# Patient Record
Sex: FEMALE | Race: ASIAN | NOT HISPANIC OR LATINO
[De-identification: names, ages, dates, MRNs, and addresses within clinical notes are randomized per-mention and may not be internally consistent; named-entity substitution may affect disease eponyms.]

---

## 2017-09-14 ENCOUNTER — TRANSCRIPTION ENCOUNTER (OUTPATIENT)
Age: 27
End: 2017-09-14

## 2017-09-14 PROBLEM — Z00.00 ENCOUNTER FOR PREVENTIVE HEALTH EXAMINATION: Noted: 2017-09-14

## 2017-09-15 ENCOUNTER — ASOB RESULT (OUTPATIENT)
Age: 27
End: 2017-09-15

## 2017-09-15 ENCOUNTER — APPOINTMENT (OUTPATIENT)
Dept: ANTEPARTUM | Facility: CLINIC | Age: 27
End: 2017-09-15
Payer: MEDICAID

## 2017-09-15 PROCEDURE — 76813 OB US NUCHAL MEAS 1 GEST: CPT

## 2017-10-31 ENCOUNTER — ASOB RESULT (OUTPATIENT)
Age: 27
End: 2017-10-31

## 2017-10-31 ENCOUNTER — APPOINTMENT (OUTPATIENT)
Dept: ANTEPARTUM | Facility: CLINIC | Age: 27
End: 2017-10-31
Payer: MEDICAID

## 2017-10-31 PROCEDURE — 76820 UMBILICAL ARTERY ECHO: CPT

## 2017-10-31 PROCEDURE — 76811 OB US DETAILED SNGL FETUS: CPT

## 2017-11-07 ENCOUNTER — OUTPATIENT (OUTPATIENT)
Dept: INPATIENT UNIT | Facility: HOSPITAL | Age: 27
LOS: 1 days | Discharge: HOME | End: 2017-11-07

## 2017-11-07 LAB
AMNISURE ROM (RUPTURE OF MEMBRANES): NEGATIVE — SIGNIFICANT CHANGE UP
APPEARANCE UR: CLEAR — SIGNIFICANT CHANGE UP
BACTERIA # UR AUTO: (no result)
BILIRUB UR-MCNC: NEGATIVE — SIGNIFICANT CHANGE UP
COLOR SPEC: YELLOW — SIGNIFICANT CHANGE UP
DIFF PNL FLD: NEGATIVE — SIGNIFICANT CHANGE UP
EPI CELLS # UR: SIGNIFICANT CHANGE UP
GLUCOSE UR QL: NEGATIVE MG/DL — SIGNIFICANT CHANGE UP
KETONES UR-MCNC: NEGATIVE — SIGNIFICANT CHANGE UP
LEUKOCYTE ESTERASE UR-ACNC: (no result)
NITRITE UR-MCNC: NEGATIVE — SIGNIFICANT CHANGE UP
PH UR: 7 — SIGNIFICANT CHANGE UP (ref 5–8)
PROT UR-MCNC: NEGATIVE MG/DL — SIGNIFICANT CHANGE UP
RBC CASTS # UR COMP ASSIST: NEGATIVE /HPF — SIGNIFICANT CHANGE UP (ref 0–4)
SP GR SPEC: 1.01 — SIGNIFICANT CHANGE UP (ref 1.01–1.02)
UROBILINOGEN FLD QL: NEGATIVE MG/DL — SIGNIFICANT CHANGE UP
WBC UR QL: SIGNIFICANT CHANGE UP

## 2017-11-13 DIAGNOSIS — O47.9 FALSE LABOR, UNSPECIFIED: ICD-10-CM

## 2018-01-23 ENCOUNTER — APPOINTMENT (OUTPATIENT)
Dept: ANTEPARTUM | Facility: CLINIC | Age: 28
End: 2018-01-23
Payer: MEDICAID

## 2018-01-23 ENCOUNTER — ASOB RESULT (OUTPATIENT)
Age: 28
End: 2018-01-23

## 2018-01-23 PROCEDURE — 76816 OB US FOLLOW-UP PER FETUS: CPT

## 2018-01-23 PROCEDURE — 76820 UMBILICAL ARTERY ECHO: CPT

## 2018-02-05 ENCOUNTER — APPOINTMENT (OUTPATIENT)
Dept: ANTEPARTUM | Facility: CLINIC | Age: 28
End: 2018-02-05
Payer: MEDICAID

## 2018-02-05 ENCOUNTER — ASOB RESULT (OUTPATIENT)
Age: 28
End: 2018-02-05

## 2018-02-05 PROCEDURE — 76816 OB US FOLLOW-UP PER FETUS: CPT

## 2018-02-05 PROCEDURE — 76819 FETAL BIOPHYS PROFIL W/O NST: CPT

## 2018-02-06 ENCOUNTER — INPATIENT (INPATIENT)
Facility: HOSPITAL | Age: 28
LOS: 6 days | Discharge: HOME | End: 2018-02-13
Attending: OBSTETRICS & GYNECOLOGY | Admitting: OBSTETRICS & GYNECOLOGY
Payer: MEDICAID

## 2018-02-06 VITALS
DIASTOLIC BLOOD PRESSURE: 80 MMHG | RESPIRATION RATE: 18 BRPM | SYSTOLIC BLOOD PRESSURE: 136 MMHG | HEIGHT: 72 IN | OXYGEN SATURATION: 100 % | HEART RATE: 96 BPM | WEIGHT: 220.02 LBS | TEMPERATURE: 98 F

## 2018-02-06 PROCEDURE — 99285 EMERGENCY DEPT VISIT HI MDM: CPT

## 2018-02-06 RX ORDER — ACETAMINOPHEN 500 MG
975 TABLET ORAL ONCE
Qty: 0 | Refills: 0 | Status: COMPLETED | OUTPATIENT
Start: 2018-02-06 | End: 2018-02-07

## 2018-02-06 NOTE — ED ADULT NURSE NOTE - OBJECTIVE STATEMENT
Pt presents to ER 34 weeks pregnant c/o left lower extremity swelling. Pt reports onset of symptoms began Sunday night. LLE is swollen, erythremic, skin intact, warm to touch. Pt denies fever/chills/SOB/CP. Swelling is from left hip to ankle. Denies trauma/falls. AO x 3 oriented to baseline, normal breathing pattern with no difficulty

## 2018-02-07 LAB
ABO RH CONFIRMATION: SIGNIFICANT CHANGE UP
ALBUMIN SERPL ELPH-MCNC: 2.5 G/DL — LOW (ref 3.3–5)
ALP SERPL-CCNC: 150 U/L — HIGH (ref 40–120)
ALT FLD-CCNC: 15 U/L — SIGNIFICANT CHANGE UP (ref 12–78)
ANION GAP SERPL CALC-SCNC: 11 MMOL/L — SIGNIFICANT CHANGE UP (ref 5–17)
APTT BLD: 40.1 SEC — HIGH (ref 27.5–37.4)
AST SERPL-CCNC: 17 U/L — SIGNIFICANT CHANGE UP (ref 15–37)
BILIRUB SERPL-MCNC: 0.4 MG/DL — SIGNIFICANT CHANGE UP (ref 0.2–1.2)
BLD GP AB SCN SERPL QL: SIGNIFICANT CHANGE UP
BUN SERPL-MCNC: 12 MG/DL — SIGNIFICANT CHANGE UP (ref 7–23)
CALCIUM SERPL-MCNC: 8.9 MG/DL — SIGNIFICANT CHANGE UP (ref 8.5–10.1)
CHLORIDE SERPL-SCNC: 104 MMOL/L — SIGNIFICANT CHANGE UP (ref 96–108)
CO2 SERPL-SCNC: 20 MMOL/L — LOW (ref 22–31)
CREAT SERPL-MCNC: 0.59 MG/DL — SIGNIFICANT CHANGE UP (ref 0.5–1.3)
GLUCOSE SERPL-MCNC: 89 MG/DL — SIGNIFICANT CHANGE UP (ref 70–99)
HCT VFR BLD CALC: 37.6 % — SIGNIFICANT CHANGE UP (ref 34.5–45)
HGB BLD-MCNC: 12.2 G/DL — SIGNIFICANT CHANGE UP (ref 11.5–15.5)
INR BLD: 0.98 RATIO — SIGNIFICANT CHANGE UP (ref 0.88–1.16)
MCHC RBC-ENTMCNC: 24.7 PG — LOW (ref 27–34)
MCHC RBC-ENTMCNC: 32.6 GM/DL — SIGNIFICANT CHANGE UP (ref 32–36)
MCV RBC AUTO: 75.8 FL — LOW (ref 80–100)
PLATELET # BLD AUTO: 282 K/UL — SIGNIFICANT CHANGE UP (ref 150–400)
POTASSIUM SERPL-MCNC: 3.9 MMOL/L — SIGNIFICANT CHANGE UP (ref 3.5–5.3)
POTASSIUM SERPL-SCNC: 3.9 MMOL/L — SIGNIFICANT CHANGE UP (ref 3.5–5.3)
PROT SERPL-MCNC: 7.3 GM/DL — SIGNIFICANT CHANGE UP (ref 6–8.3)
PROTHROM AB SERPL-ACNC: 10.6 SEC — SIGNIFICANT CHANGE UP (ref 9.8–12.7)
RBC # BLD: 4.96 M/UL — SIGNIFICANT CHANGE UP (ref 3.8–5.2)
RBC # FLD: 14.2 % — SIGNIFICANT CHANGE UP (ref 10.3–14.5)
SODIUM SERPL-SCNC: 135 MMOL/L — SIGNIFICANT CHANGE UP (ref 135–145)
T PALLIDUM AB TITR SER: NEGATIVE — SIGNIFICANT CHANGE UP
TYPE + AB SCN PNL BLD: SIGNIFICANT CHANGE UP
WBC # BLD: 12.9 K/UL — HIGH (ref 3.8–10.5)
WBC # FLD AUTO: 12.9 K/UL — HIGH (ref 3.8–10.5)

## 2018-02-07 PROCEDURE — 93971 EXTREMITY STUDY: CPT | Mod: 26,LT

## 2018-02-07 RX ORDER — ENOXAPARIN SODIUM 100 MG/ML
100 INJECTION SUBCUTANEOUS ONCE
Qty: 0 | Refills: 0 | Status: COMPLETED | OUTPATIENT
Start: 2018-02-07 | End: 2018-02-07

## 2018-02-07 RX ORDER — ENOXAPARIN SODIUM 100 MG/ML
100 INJECTION SUBCUTANEOUS EVERY 12 HOURS
Qty: 0 | Refills: 0 | Status: DISCONTINUED | OUTPATIENT
Start: 2018-02-07 | End: 2018-02-13

## 2018-02-07 RX ORDER — ACETAMINOPHEN 500 MG
325 TABLET ORAL EVERY 4 HOURS
Qty: 0 | Refills: 0 | Status: DISCONTINUED | OUTPATIENT
Start: 2018-02-07 | End: 2018-02-13

## 2018-02-07 RX ORDER — ENOXAPARIN SODIUM 100 MG/ML
100 INJECTION SUBCUTANEOUS
Qty: 0 | Refills: 0 | Status: DISCONTINUED | OUTPATIENT
Start: 2018-02-07 | End: 2018-02-07

## 2018-02-07 RX ORDER — SODIUM CHLORIDE 9 MG/ML
1000 INJECTION, SOLUTION INTRAVENOUS
Qty: 0 | Refills: 0 | Status: DISCONTINUED | OUTPATIENT
Start: 2018-02-07 | End: 2018-02-07

## 2018-02-07 RX ORDER — SODIUM CHLORIDE 9 MG/ML
3 INJECTION INTRAMUSCULAR; INTRAVENOUS; SUBCUTANEOUS EVERY 8 HOURS
Qty: 0 | Refills: 0 | Status: DISCONTINUED | OUTPATIENT
Start: 2018-02-07 | End: 2018-02-13

## 2018-02-07 RX ORDER — ACETAMINOPHEN 500 MG
650 TABLET ORAL EVERY 6 HOURS
Qty: 0 | Refills: 0 | Status: DISCONTINUED | OUTPATIENT
Start: 2018-02-07 | End: 2018-02-13

## 2018-02-07 RX ADMIN — Medication 650 MILLIGRAM(S): at 07:35

## 2018-02-07 RX ADMIN — ENOXAPARIN SODIUM 100 MILLIGRAM(S): 100 INJECTION SUBCUTANEOUS at 13:03

## 2018-02-07 RX ADMIN — SODIUM CHLORIDE 3 MILLILITER(S): 9 INJECTION INTRAMUSCULAR; INTRAVENOUS; SUBCUTANEOUS at 22:27

## 2018-02-07 RX ADMIN — ENOXAPARIN SODIUM 100 MILLIGRAM(S): 100 INJECTION SUBCUTANEOUS at 01:30

## 2018-02-07 RX ADMIN — Medication 650 MILLIGRAM(S): at 19:44

## 2018-02-07 RX ADMIN — Medication 975 MILLIGRAM(S): at 00:20

## 2018-02-07 NOTE — H&P ADULT - NSHPPHYSICALEXAM_GEN_ALL_CORE
WNWD female NAD with left leg swelling from thigh to foot.   heart s1/s2  lungs cta with deep inspiration  Abd: gravid uterus 34 cm  no CVAT  VE: deferred  LE: left markedly swollen from groin to foot, non pitting, no redness to particular vein  right neg Melecio's, no edema

## 2018-02-07 NOTE — PROGRESS NOTE ADULT - SUBJECTIVE AND OBJECTIVE BOX
CHIEF COMPLAINT: Patient is a 27y old  Female who presents with a chief complaint of left leg pain (2018 02:29)    HPI:  28 y/o  at 34 weeks gestation followed by Dr Rubio for routine prenatal care noted left leg swelling beginning on 18. States that the swelling progressed and became the worst on 18 and she developed pain where it was difficult to walk. Had a 2-3 hour car ride recently but no other travel. Denies prior history of clots or family history of cancer.     Interval HPI: Patient was seen and examined at bedside. No acute events overnight. Patient is still complaining of left lower extremity pain and swelling. Is minimally ambulatory, as she is still able to get up and walk to the bathroom. Denies chest pain, palpitations, abdominal pain, n/v/d, LOF, contractions or vaginal bleedings. Reports feeling normal fetal activity. No signs or symptoms of bleeding, and no other complaints.      PAST MEDICAL & SURGICAL HISTORY:  No pertinent past medical history  No significant past surgical history      SOCIAL HISTORY: No active tobacco, alcohol or illicit drug use    FAMILY HISTORY:  No pertinent family history in first degree relatives      MEDICATIONS  (STANDING):  enoxaparin Injectable 100 milliGRAM(s) SubCutaneous every 12 hours  sodium chloride 0.45%. 1000 milliLiter(s) (50 mL/Hr) IV Continuous <Continuous>    MEDICATIONS  (PRN):  acetaminophen   Tablet. 325 milliGRAM(s) Oral every 4 hours PRN Mild Pain (1 - 3)  acetaminophen   Tablet. 650 milliGRAM(s) Oral every 6 hours PRN Moderate Pain (4 - 6)      Allergies    No Known Allergies    Intolerances      REVIEW OF SYSTEMS: Is negative for eye, ENT, GI, , allergic, dermatologic, musculoskeletal and neurologic, except as described above.    VITAL SIGNS:   Vital Signs Last 24 Hrs  T(C): 36.5 (2018 07:05), Max: 36.6 (2018 22:51)  T(F): 97.7 (2018 07:05), Max: 97.8 (2018 22:51)  HR: 83 (2018 07:05) (83 - 96)  BP: 98/67 (2018 07:05) (98/67 - 136/80)  RR: 16 (2018 07:05) (16 - 18)  SpO2: 100% (2018 07:05) (99% - 100%)      PHYSICAL EXAM:    Constitutional: NAD, awake and alert, well-developed  Eyes:  EOMI, no oral cyanosis, conjunctivae clear, anicteric.  Pulmonary: Non-labored, breath sounds are clear bilaterally, no wheezing, rales or rhonchi  Cardiovascular:  regular S1 and S2. No murmur.  No rubs, gallops or clicks  Gastrointestinal: Bowel Sounds present, soft, nontender; gravid uterus  Neurological: Alert, strength and sensitivity are grossly intact  Skin: No obvious lesions/rashes.  Extremities: +left leg edema with tenderness to palpation  Psych:  Mood & affect appropriate.    LABS: All Labs Reviewed:                        12.2   12.9  )-----------( 282      ( 2018 02:49 )             37.6     2018 02:49    135    |  104    |  12     ----------------------------<  89     3.9     |  20     |  0.59     Ca    8.9        2018 02:49    TPro  7.3    /  Alb  2.5    /  TBili  0.4    /  DBili  x      /  AST  17     /  ALT  15     /  AlkPhos  150    2018 02:49    PT/INR - ( 2018 02:49 )   PT: 10.6 sec;   INR: 0.98 ratio         PTT - ( 2018 02:49 )  PTT:40.1 sec    RADIOLOGY:  < from: US Duplex Venous Lower Ext Ltd, Left (18 @ 00:45) >  EXAM:  US DPLX LWR EXT VEINS LTD LT                            PROCEDURE DATE:  2018        INTERPRETATION:  CLINICAL INFORMATION: Pregnant with left lower extremity   pain and swelling.    TECHNIQUE: Duplex Doppler ultrasound was performedutilizing integrated   2-D real-time imaging color flow Doppler and Doppler spectral analysis of   the left lower extremity vein(s). Venous Doppler waveforms were obtained   for analysis.    COMPARISON: No similar prior studies are available for comparison.    FINDINGS:   Intraluminal echogenicity with lack of color flow and lack of compression   is seen distending the left common femoral, proximal greater saphenous   and femoral veins compatible with an occlusive deep venous thrombosis.   There is no evidence of deep venous thrombosis in the left popliteal or   posterior tibial veins.    The contralateral common femoral vein is patent.    IMPRESSION:  Above the knee left lower extremity deep venous thrombosis.    < end of copied text >

## 2018-02-07 NOTE — H&P ADULT - NSHPLABSRESULTS_GEN_ALL_CORE
T(C): 36.6 (02-06-18 @ 22:51), Max: 36.6 (02-06-18 @ 22:51)  HR: 96 (02-06-18 @ 22:51) (96 - 96)  BP: 136/80 (02-06-18 @ 22:51) (136/80 - 136/80)  RR: 18 (02-06-18 @ 22:51) (18 - 18)  SpO2: 100% (02-06-18 @ 22:51) (100% - 100%)  Wt(kg): --

## 2018-02-07 NOTE — ED PROVIDER NOTE - PROGRESS NOTE DETAILS
case d/w Dr Tristan (OB) and will evaluate pt pt evaluated by Kun and will admit.  request medicine consult.  case d/w Danay and will consult.

## 2018-02-07 NOTE — ED ADULT NURSE REASSESSMENT NOTE - NS ED NURSE REASSESS COMMENT FT1
Labor and Delivery nurse at the bedside and reported to me . Pt resting comfortable in bed, denies CP/SOB. Pt updated on plan of care

## 2018-02-07 NOTE — H&P ADULT - HISTORY OF PRESENT ILLNESS
at 34 weeks gestation followed by Dr Rubio for routine prenatal care noted beginning of left leg swelling on  or . Pt was seen for ultrasound in OB office on 18, but did not mention leg pain. when pain became unbearable, she presented to ER.  Pt denies recent travel other than 1 hour car ride to [place]. There is no family history of blood clots as reinforced by her mother.  Baby is active. No vaginal bleeding,  cramping nor loss of fluid.  Pt was being followed sonographically for low fluid, which she states was adequate on Tues 18.

## 2018-02-07 NOTE — PROGRESS NOTE ADULT - ASSESSMENT
28 y/o  at 34 weeks gestation admitted with acute left lower extremity DVT.    -Continue 100mg lovenox bid (1mg/kg bid)  -Appreciate medicine consult recommendations  -Follow up Xa level 4hours after 3rd dose of lovenox (should be collected 18 at 0530AM)  -NST reactive, No present evidence of fetal compromise  -No signs or symptoms of bleeding at this time  -Will discuss with Dr. Rubio 26 y/o  at 34 weeks gestation admitted with acute left lower extremity DVT.    -Likely secondary to hypercoagulability of pregnancy and long car ride  -Continue 100mg lovenox bid (1mg/kg bid)  -MFM consult appreciated, may need to consult hematology to evaluate role of switching to heparin  -Appreciate medicine consult recommendations as well  -Follow up Xa level 4hours after 3rd dose of lovenox (should be collected 18 at 0530AM)  -NST reactive, No present evidence of fetal compromise  -No signs or symptoms of bleeding at this time  -Will discuss with Dr. Rubio 28 y/o  at 34 weeks gestation admitted with acute left lower extremity DVT.    -Likely secondary to hypercoagulability of pregnancy and long car ride  -Continue 100mg lovenox bid (1mg/kg bid) at this time  -MFM consult appreciated, may need to consult heme to evaluate role of switching to heparin  -Appreciate medicine consult recommendations as well  -Follow up Xa level 4hours after 3rd dose of lovenox (should be collected 18 at 0530AM)  -NST reactive, No present evidence of fetal compromise  -No signs or symptoms of bleeding at this time  -Will discuss with Dr. Rubio

## 2018-02-07 NOTE — ED PROVIDER NOTE - OBJECTIVE STATEMENT
26 y/o female in ED c/o 28 y/o female in ED c/o worsening LLE swelling and pain x 3 days.  pt states approx 34 weeks pregnant.  pt denies any fever, HA, cp, sob, n/v/d/abd pain.  denies any recent travel or sick contacts.

## 2018-02-08 LAB
ANION GAP SERPL CALC-SCNC: 8 MMOL/L — SIGNIFICANT CHANGE UP (ref 5–17)
APPEARANCE UR: CLEAR — SIGNIFICANT CHANGE UP
BACTERIA # UR AUTO: (no result)
BASOPHILS # BLD AUTO: 0.1 K/UL — SIGNIFICANT CHANGE UP (ref 0–0.2)
BASOPHILS NFR BLD AUTO: 0.4 % — SIGNIFICANT CHANGE UP (ref 0–2)
BILIRUB UR-MCNC: NEGATIVE — SIGNIFICANT CHANGE UP
BUN SERPL-MCNC: 12 MG/DL — SIGNIFICANT CHANGE UP (ref 7–23)
CALCIUM SERPL-MCNC: 8.7 MG/DL — SIGNIFICANT CHANGE UP (ref 8.5–10.1)
CHLORIDE SERPL-SCNC: 104 MMOL/L — SIGNIFICANT CHANGE UP (ref 96–108)
CO2 SERPL-SCNC: 22 MMOL/L — SIGNIFICANT CHANGE UP (ref 22–31)
COLOR SPEC: (no result)
COMMENT - URINE: SIGNIFICANT CHANGE UP
CREAT SERPL-MCNC: 0.61 MG/DL — SIGNIFICANT CHANGE UP (ref 0.5–1.3)
DIFF PNL FLD: (no result)
EOSINOPHIL # BLD AUTO: 0 K/UL — SIGNIFICANT CHANGE UP (ref 0–0.5)
EOSINOPHIL NFR BLD AUTO: 0.4 % — SIGNIFICANT CHANGE UP (ref 0–6)
EPI CELLS # UR: SIGNIFICANT CHANGE UP
GLUCOSE SERPL-MCNC: 116 MG/DL — HIGH (ref 70–99)
GLUCOSE UR QL: NEGATIVE MG/DL — SIGNIFICANT CHANGE UP
HCT VFR BLD CALC: 35.4 % — SIGNIFICANT CHANGE UP (ref 34.5–45)
HGB BLD-MCNC: 11.5 G/DL — SIGNIFICANT CHANGE UP (ref 11.5–15.5)
KETONES UR-MCNC: (no result)
LEUKOCYTE ESTERASE UR-ACNC: (no result)
LYMPHOCYTES # BLD AUTO: 1.4 K/UL — SIGNIFICANT CHANGE UP (ref 1–3.3)
LYMPHOCYTES # BLD AUTO: 11 % — LOW (ref 13–44)
MCHC RBC-ENTMCNC: 24.8 PG — LOW (ref 27–34)
MCHC RBC-ENTMCNC: 32.4 GM/DL — SIGNIFICANT CHANGE UP (ref 32–36)
MCV RBC AUTO: 76.6 FL — LOW (ref 80–100)
MONOCYTES # BLD AUTO: 0.9 K/UL — SIGNIFICANT CHANGE UP (ref 0–0.9)
MONOCYTES NFR BLD AUTO: 6.8 % — SIGNIFICANT CHANGE UP (ref 2–14)
NEUTROPHILS # BLD AUTO: 10.3 K/UL — HIGH (ref 1.8–7.4)
NEUTROPHILS NFR BLD AUTO: 81.4 % — HIGH (ref 43–77)
NITRITE UR-MCNC: NEGATIVE — SIGNIFICANT CHANGE UP
PH UR: 6 — SIGNIFICANT CHANGE UP (ref 5–8)
PLATELET # BLD AUTO: 252 K/UL — SIGNIFICANT CHANGE UP (ref 150–400)
POTASSIUM SERPL-MCNC: 3.9 MMOL/L — SIGNIFICANT CHANGE UP (ref 3.5–5.3)
POTASSIUM SERPL-SCNC: 3.9 MMOL/L — SIGNIFICANT CHANGE UP (ref 3.5–5.3)
PROT UR-MCNC: 15 MG/DL
RBC # BLD: 4.62 M/UL — SIGNIFICANT CHANGE UP (ref 3.8–5.2)
RBC # FLD: 14.4 % — SIGNIFICANT CHANGE UP (ref 10.3–14.5)
RBC CASTS # UR COMP ASSIST: >50 /HPF (ref 0–4)
SODIUM SERPL-SCNC: 134 MMOL/L — LOW (ref 135–145)
SP GR SPEC: 1.02 — SIGNIFICANT CHANGE UP (ref 1.01–1.02)
UFH PPP CHRO-ACNC: 0.93 U/ML — HIGH (ref 0.3–0.7)
UROBILINOGEN FLD QL: 1 MG/DL
WBC # BLD: 12.7 K/UL — HIGH (ref 3.8–10.5)
WBC # FLD AUTO: 12.7 K/UL — HIGH (ref 3.8–10.5)
WBC UR QL: SIGNIFICANT CHANGE UP

## 2018-02-08 PROCEDURE — 99222 1ST HOSP IP/OBS MODERATE 55: CPT

## 2018-02-08 RX ORDER — SODIUM CHLORIDE 9 MG/ML
1000 INJECTION INTRAMUSCULAR; INTRAVENOUS; SUBCUTANEOUS
Qty: 0 | Refills: 0 | Status: DISCONTINUED | OUTPATIENT
Start: 2018-02-08 | End: 2018-02-09

## 2018-02-08 RX ORDER — SODIUM CHLORIDE 9 MG/ML
1000 INJECTION, SOLUTION INTRAVENOUS ONCE
Qty: 0 | Refills: 0 | Status: COMPLETED | OUTPATIENT
Start: 2018-02-08 | End: 2018-02-08

## 2018-02-08 RX ORDER — SODIUM CHLORIDE 9 MG/ML
1000 INJECTION, SOLUTION INTRAVENOUS
Qty: 0 | Refills: 0 | Status: DISCONTINUED | OUTPATIENT
Start: 2018-02-08 | End: 2018-02-08

## 2018-02-08 RX ORDER — SODIUM CHLORIDE 9 MG/ML
1000 INJECTION, SOLUTION INTRAVENOUS
Qty: 0 | Refills: 0 | Status: COMPLETED | OUTPATIENT
Start: 2018-02-08 | End: 2018-02-08

## 2018-02-08 RX ADMIN — Medication 650 MILLIGRAM(S): at 14:15

## 2018-02-08 RX ADMIN — SODIUM CHLORIDE 3 MILLILITER(S): 9 INJECTION INTRAMUSCULAR; INTRAVENOUS; SUBCUTANEOUS at 14:23

## 2018-02-08 RX ADMIN — Medication 650 MILLIGRAM(S): at 06:21

## 2018-02-08 RX ADMIN — Medication 650 MILLIGRAM(S): at 23:52

## 2018-02-08 RX ADMIN — Medication 650 MILLIGRAM(S): at 22:52

## 2018-02-08 RX ADMIN — ENOXAPARIN SODIUM 100 MILLIGRAM(S): 100 INJECTION SUBCUTANEOUS at 01:03

## 2018-02-08 RX ADMIN — SODIUM CHLORIDE 250 MILLILITER(S): 9 INJECTION, SOLUTION INTRAVENOUS at 15:52

## 2018-02-08 RX ADMIN — SODIUM CHLORIDE 2000 MILLILITER(S): 9 INJECTION, SOLUTION INTRAVENOUS at 12:43

## 2018-02-08 RX ADMIN — SODIUM CHLORIDE 3 MILLILITER(S): 9 INJECTION INTRAMUSCULAR; INTRAVENOUS; SUBCUTANEOUS at 06:28

## 2018-02-08 RX ADMIN — Medication 650 MILLIGRAM(S): at 15:53

## 2018-02-08 RX ADMIN — ENOXAPARIN SODIUM 100 MILLIGRAM(S): 100 INJECTION SUBCUTANEOUS at 14:19

## 2018-02-08 NOTE — PROGRESS NOTE ADULT - ASSESSMENT
27F.  admitted 18.   @ 34 weeks gestation.  c/o LLE swelling and pain.  onset of symptoms 2 days prior to admission.  LE venous doppler c/w above the knee LLE DVT.    LLE DVT likely pregnancy related (hypercoagulable from pregnancy).  -US noted. Pt currently denies any SOB. No tachycardia on exam.  -c/w Lovenox 1mg/kg q12h.  -Hematology + IR + VSx consults.    pregnancy 34 weeks  -management per OB.    DVT prophylaxis.  -LMWH.    disposition.  -OB service.    communication.  -d/w RN.  -d/w Hematology.

## 2018-02-08 NOTE — PROGRESS NOTE ADULT - SUBJECTIVE AND OBJECTIVE BOX
S: Patient has same complaints of pain when she tries to ambulate. Degree of swelling is not changed. Measurement above the knee is same and measurement of left ankle is slightly increased by a centimeter.  Discussed case with Dr. Luis A Joseph from Hematology who advised coordination of management with [facility] and also advised a vascular surgery and interventional radiology consult due to the significant size of the clot and swelling. Agrees with present Lovenox dose for now.  Patient informed of the plan of management and will do daily NST's and biweekly BPP.  MFM note not seen so will reconsult today.    O: Vital Signs Last 24 Hrs    T(C): 36.9 (08 Feb 2018 07:37), Max: 37.1 (08 Feb 2018 04:15)  T(F): 98.5 (08 Feb 2018 07:37), Max: 98.8 (08 Feb 2018 04:15)  HR: 90 (08 Feb 2018 07:37) (80 - 101)  BP: 118/59 (08 Feb 2018 07:37) (109/81 - 118/59)  BP(mean): --  RR: 16 (08 Feb 2018 07:37) (16 - 16)  SpO2: 99% (08 Feb 2018 07:37) (99% - 100%)    Gen: NAD  Abd: soft, NT, ND, fundus firm below umbilicus  Ext: no tenderness    Labs:                        11.5   12.7  )-----------( 252      ( 08 Feb 2018 07:02 )             35.4         Plan:  As above.

## 2018-02-08 NOTE — PROGRESS NOTE ADULT - SUBJECTIVE AND OBJECTIVE BOX
CC:  Patient is a 27y old  Female who presents with a chief complaint of left leg pain (07 Feb 2018 02:29)    SUBJECTIVE:  patient's mother at bedside.  lying in bed comfortably w/ her left leg elevated.  Venodyne noted on right leg.  reports swelling has modestly increased to mid-thigh.  nontender.  comfortable.    ROS:  (-) LLE pain.    acetaminophen   Tablet. 325 milliGRAM(s) Oral every 4 hours PRN  acetaminophen   Tablet. 650 milliGRAM(s) Oral every 6 hours PRN  enoxaparin Injectable 100 milliGRAM(s) SubCutaneous every 12 hours  sodium chloride 0.9% lock flush 3 milliLiter(s) IV Push every 8 hours    T(C): 36.9 (02-08-18 @ 07:37), Max: 37.1 (02-08-18 @ 04:15)  HR: 90 (02-08-18 @ 07:37) (88 - 101)  BP: 118/59 (02-08-18 @ 07:37) (110/62 - 118/59)  RR: 16 (02-08-18 @ 07:37) (16 - 16)  SpO2: 99% (02-08-18 @ 07:37) (99% - 100%)    PHYSICAL EXAM:  no acute distress.  neck supple.  JVP nml.  lungs CTA.  no WRR.  heart RRR.  no MRG.  abd gravid.  soft.  NT.  ND.  ext (+) LLE edema to level of mid-thigh.  right calf warm to touch.  dorsalis pedis pulse 2/4.  A&Ox3.                        11.5   12.7  )-----------( 252      ( 08 Feb 2018 07:02 )             35.4     02-08    134<L>  |  104  |  12  ----------------------------<  116<H>  3.9   |  22  |  0.61    Ca    8.7      08 Feb 2018 07:02    TPro  7.3  /  Alb  2.5<L>  /  TBili  0.4  /  DBili  x   /  AST  17  /  ALT  15  /  AlkPhos  150<H>  02-07

## 2018-02-09 DIAGNOSIS — O22.30 DEEP PHLEBOTHROMBOSIS IN PREGNANCY, UNSPECIFIED TRIMESTER: ICD-10-CM

## 2018-02-09 PROCEDURE — 99233 SBSQ HOSP IP/OBS HIGH 50: CPT

## 2018-02-09 RX ADMIN — SODIUM CHLORIDE 150 MILLILITER(S): 9 INJECTION INTRAMUSCULAR; INTRAVENOUS; SUBCUTANEOUS at 09:44

## 2018-02-09 RX ADMIN — Medication 650 MILLIGRAM(S): at 20:13

## 2018-02-09 RX ADMIN — ENOXAPARIN SODIUM 100 MILLIGRAM(S): 100 INJECTION SUBCUTANEOUS at 01:38

## 2018-02-09 RX ADMIN — Medication 650 MILLIGRAM(S): at 21:13

## 2018-02-09 RX ADMIN — SODIUM CHLORIDE 3 MILLILITER(S): 9 INJECTION INTRAMUSCULAR; INTRAVENOUS; SUBCUTANEOUS at 06:12

## 2018-02-09 RX ADMIN — ENOXAPARIN SODIUM 100 MILLIGRAM(S): 100 INJECTION SUBCUTANEOUS at 13:28

## 2018-02-09 NOTE — PROGRESS NOTE ADULT - SUBJECTIVE AND OBJECTIVE BOX
HD# 3  Feeling less pain in her lower leg. Told her about plan to consult IR and consult with MFM. Has been kept NPO.  Dr. Newsome called but was in a conference.  Left a message for him to call.

## 2018-02-09 NOTE — CONSULT NOTE ADULT - CONSULT REASON
36 week pregnant /LLE DVT
DVT in pregnancy
DVT in pregnancy
L leg ileofemoral DVT
left lower extremity DVT in a female 34 weeks pregnant
Acute LLE  DVT Pt is 34weeks gestation, anticoagulation management

## 2018-02-09 NOTE — PROGRESS NOTE ADULT - SUBJECTIVE AND OBJECTIVE BOX
CC:  Patient is a 27y old  Female who presents with a chief complaint of left leg pain (2018 02:29)    : left leg pain better  no thrombectomy      PHYSICAL EXAM:    Daily     Daily     ICU Vital Signs Last 24 Hrs  T(C): 36.7 (2018 07:30), Max: 37.3 (2018 00:01)  T(F): 98.1 (2018 07:30), Max: 99.1 (2018 00:01)  HR: 97 (2018 07:30) (84 - 97)  BP: 117/76 (2018 07:30) (114/66 - 124/73)  BP(mean): --  ABP: --  ABP(mean): --  RR: 16 (2018 07:30) (16 - 16)  SpO2: 100% (2018 07:30) (100% - 100%)      Constitutional: Well appearing  HEENT: Atraumatic,   left leg swollen as compared to right                          11.5   12.7  )-----------( 252      ( 2018 07:02 )             35.4       CBC Full  -  ( 2018 07:02 )  WBC Count : 12.7 K/uL  Hemoglobin : 11.5 g/dL  Hematocrit : 35.4 %  Platelet Count - Automated : 252 K/uL  Mean Cell Volume : 76.6 fl  Mean Cell Hemoglobin : 24.8 pg  Mean Cell Hemoglobin Concentration : 32.4 gm/dL  Auto Neutrophil # : 10.3 K/uL  Auto Lymphocyte # : 1.4 K/uL  Auto Monocyte # : 0.9 K/uL  Auto Eosinophil # : 0.0 K/uL  Auto Basophil # : 0.1 K/uL  Auto Neutrophil % : 81.4 %  Auto Lymphocyte % : 11.0 %  Auto Monocyte % : 6.8 %  Auto Eosinophil % : 0.4 %  Auto Basophil % : 0.4 %      08    134<L>  |  104  |  12  ----------------------------<  116<H>  3.9   |  22  |  0.61    Ca    8.7      2018 07:02                      Urinalysis Basic - ( 2018 13:30 )    Color: Krissy / Appearance: Clear / S.020 / pH: x  Gluc: x / Ketone: Large  / Bili: Negative / Urobili: 1 mg/dL   Blood: x / Protein: 15 mg/dL / Nitrite: Negative   Leuk Esterase: Small / RBC: 3-5 /HPF / WBC 3-5   Sq Epi: x / Non Sq Epi: Many / Bacteria: Many            MEDICATIONS  (STANDING):  enoxaparin Injectable 100 milliGRAM(s) SubCutaneous every 12 hours  sodium chloride 0.9% lock flush 3 milliLiter(s) IV Push every 8 hours

## 2018-02-09 NOTE — CONSULT NOTE ADULT - SUBJECTIVE AND OBJECTIVE BOX
HPI:   at 34 weeks gestation followed by Dr García for routine prenatal care noted beginning of left leg swelling on  or . Pt was seen for ultrasound in OB office on 18, but did not mention leg pain. when pain became unbearable, she presented to ER.  Pt denies recent travel other than 1 hour car ride to [place]. There is no family history of blood clots as reinforced by her mother.  Baby is active. No vaginal bleeding,  cramping nor loss of fluid.  Pt was being followed sonographically for low fluid, which she states was adequate on 18. (2018 02:29)      Patient is a 27y old  Female who presents with a chief complaint of left leg pain (2018 02:29)  pt found to have an acute left le DVT.     Consulted by Dr. Lisa García   for VTE prophylaxis, risk stratification, and anticoagulation management.    PAST MEDICAL & SURGICAL HISTORY:  No pertinent past medical history  No significant past surgical history      IMPROVE VTE Risk Score:3    IMPROVE Bleeding Risk Score:0    Falls Risk:   High (  )  Mod (  )  Low ( X )    CRE L: 223.4  18 PT seen at bedside on 2SW.  Discussed her anticoagulation with Lovenox 100mg  sq twice a day.  Informed her she will need to be evaluated by vascular and interventional radiology to make sure she dose not need any procedures now.  states radiology was there already and she states he told her that she is clear as far as he is concerned   Benefits and risks discussed.  Questions answered will reinforce the need.      Vital Signs Last 24 Hrs  T(C): 36.9 (2018 07:37), Max: 37.1 (2018 04:15)  T(F): 98.5 (2018 07:37), Max: 98.8 (2018 04:15)  HR: 90 (2018 07:37) (88 - 101)  BP: 118/59 (2018 07:37) (110/62 - 118/59)  BP(mean): --  RR: 16 (2018 07:37) (16 - 16)  SpO2: 99% (2018 07:37) (99% - 100%)  FAMILY HISTORY:  No pertinent family history in first degree relatives    Denies any personal or familial history of clotting or bleeding disorders.    Allergies    No Known Allergies    Intolerances        REVIEW OF SYSTEMS    (  )Fever	     (  )Constipation	(  )SOB				(  )Headache	(  )Dysuria  (  )Chills	     (  )Melena	(  )Dyspnea present on exertion	                    (  )Dizziness                    (  )Polyuria  (  )Nausea	     (  )Hematochezia	(  )Cough			                    (  )Syncope   	(  )Hematuria  (  )Vomiting    (  )Chest Pain	(  )Wheezing			(  )Weakness  (  )Diarrhea     (  )Palpitations	(  )Anorexia			(  )Myalgia    All other review of systems negative: Yes      PHYSICAL EXAM:    Constitutional: Appears Well    Neurological: A& O x 3    Skin: Warm    Respiratory and Thorax: normal effort; Breath sounds: normal; No rales/wheezing/rhonchi  	  Cardiovascular: S1, S2, regular, NMBR	    Gastrointestinal: BS + x 4Q, nontender	    Genitourinary:  Bladder nondistended, nontender    Musculoskeletal:   General Right:   no muscle/joint tenderness,   normal tone, no joint swelling,   ROM: full	    General Left:   no muscle/joint tenderness,   normal tone, + swelling noted 3+ with pain when touched,  pt states it is painful to step on it.   ROM: limited      Lower extrems:   Right: no calf tenderness              negative kimberley's sign               + pedal pulses    Left:   positive calf tenderness              negative kimberley's sign               + pedal pulses                          11.5   12.7  )-----------( 252      ( 2018 07:02 )             35.4       02-08    134<L>  |  104  |  12  ----------------------------<  116<H>  3.9   |  22  |  0.61    Ca    8.7      2018 07:02    TPro  7.3  /  Alb  2.5<L>  /  TBili  0.4  /  DBili  x   /  AST  17  /  ALT  15  /  AlkPhos  150<H>  02-07      PT/INR - ( 2018 02:49 )   PT: 10.6 sec;   INR: 0.98 ratio         PTT - ( 2018 02:49 )  PTT:40.1 sec	  			  FINDINGS: left lower extremity doppler 2-7-18  Intraluminal echogenicity with lack of color flow and lack of compression   is seen distending the left common femoral, proximal greater saphenous   and femoral veins compatible with an occlusive deep venous thrombosis.   There is no evidence of deep venous thrombosis in the left popliteal or   posterior tibial veins.    The contralateral common femoral vein is patent.    IMPRESSION:  Above the knee left lower extremity deep venous thrombosis.    MEDICATIONS  (STANDING):  MEDICATIONS  (STANDING):  enoxaparin Injectable 100 milliGRAM(s) SubCutaneous every 12 hours  sodium chloride 0.9% lock flush 3 milliLiter(s) IV Push every 8 hours      DVT Prophylaxis:  LMWH                   ( x )  Heparin SQ           (  )  Coumadin             (  )  Xarelto                  (  )  Eliquis                   (  )  Venodynes           (  )  Ambulation          ( x )  UFH                       (  )  Contraindicated  (  )
Full consult to follow  Continue Lovenox 1.5 mg/kg BID  Suggest Vascular surgery Consult/ IR consult  Coordinate care w High Risk M-F medicine consult previously called.  Above reviewed w Dr Mcgraw .
Full consultation note patient seen earlier today  Case previously reviewed with gynecology Dr. García  Vascular surgery Dr. Pritchard  Hospitalist Dr. D'amico Cynthia  nurse practitioner coagulation services    27-year-old female with no prior medical issues who noted new onset left lower extremity pain and swelling over 3-4 days ago  She is referred to the emergency room where she was found to have a left lower extremity DVT  She has been admitted and placed on Lovenox.  Patient states that she and traveled several hours in the car back and forth to [place] on Sunday;she is unaware of any other precipitating events.  this is the patient's 1st pregnancy, there is no history of previous miscarriage.  Patient denies any pains or shortness of breath  Patient's nurse states she believes the ankle is modestly larger in girth than when measured yesterday.    There is no history of tobacco use,hormone therapy, or other medications.  Family history noncontributory,her father had a stroke and 57 but he also has coronary artery disease.    3 sisters one brother mother all alive and well without any history of unusual thrombotic events.  Social history she previously worked at [email or web address] in customer service, no known toxic exposures  Tobacco use never; alcohol use none    physical examination well-developed 27-year-old female alert and oriented no acute distress  HEENT normocephalic anicteric  Oral pharynx moist  Neck supple  Lungs clear  Heart S1-S2  Abdomen consistent with pregnancy  Left lower extremity edematous mildly tender  Skin warm moist, without diffuse ecchymoses and purpura petechiae or rashes    Ultrasound duplex February 7, 2018: [facility]: lack of color flow and lack of compression is seen distending the left common femoral, proximal greater saphenous and femoral veins compatible with occlusive deep venous thrombosis.    WBC 12.9, Hgb 12.2, HCT 37.6, MCV 75.8, platelet 282  Serum chemistry albumin 2.5
History and Physical:   Source of Information	Patient	  Comments/Contacts	Mother and  at bedside	  Outpatient Providers	Dr Lisa García	    Language:  · Patient/Family of Limited English Proficiency	Yes	      History of Present Illness:  Chief Complaint/Reason for Admission: left leg pain	  History of Present Illness: 	   at 34 weeks gestation followed by Dr García for routine prenatal care noted beginning of left leg swelling on  or . Pt was seen for ultrasound in OB office on 18, but did not mention leg pain. when pain became unbearable, she presented to ER.  Pt denies recent travel other than 1 hour car ride to [place]. There is no family history of blood clots as reinforced by her mother.  Baby is active. No vaginal bleeding,  cramping nor loss of fluid.  Pt was being followed sonographically for low fluid, which she states was adequate on Tues 18.    History as above    L Ileofemoral DVT clinically (duplex only imaging so far) without phlegmasia (palpable DP pulse).  No complaints of SOB, dyspnea or pleuritic chest pain.      Review of Systems:  Review of Systems: Negative	  Other Review of Systems: All other review of systems negative, except as noted in HPI	      Allergies and Intolerances:        Allergies:  	No Known Allergies:     Home Medications:   * Outpatient Medication Status not yet specified  .    Patient History:   Past Medical History:  No pertinent past medical history.    Past Surgical History:  No significant past surgical history.    Family History:  No pertinent family history in first degree relatives.    Social History:  Social History (marital status, living situation, occupation, tobacco use, alcohol and drug use, and sexual history): 	    Tobacco Screening:  · Core Measure Site	No	  · Has the patient used tobacco in the past 30 days?	No	    Risk Assessment:   Present on Admission:  Deep Venous Thrombosis	yes	  DVT Confirmed	Thrombophlebitis of Femoral vein	  Pulmonary Embolus	no	  Urinary Catheter	no	  Central Venous Catheter/PICC Line	no	  Surgical Site Incision	no	  Pressure Ulcer(s)	no	    Heart Failure:  Does this patient have a history of or has been diagnosed with heart failure? no.    HIV Screen (per [facility] Department of Health, HIV screening must be offered to every individual between ages 13 and 64)	Not applicable (known HIV negative status in last year)	  Screening uterine cytology (Pap smear) performed in last 3 years	yes	      Physical Exam:  Physical Exam: WNWD female NAD with left leg swelling from thigh to foot.   heart s1/s2  lungs cta with deep inspiration  Abd: gravid uterus 34 cm  no CVAT  VE: deferred  LE: left markedly swollen from groin to foot, non pitting, 2/4 L DP	      Labs and Results:  Labs, Radiology, Cardiology, and Other Results: T(C): 36.6 (18 @ 22:51), Max: 36.6 (18 @ 22:51)  HR: 96 (18 @ 22:51) (96 - 96)  BP: 136/80 (18 @ 22:51) (136/80 - 136/80)  RR: 18 (18 @ 22:51) (18 - 18)  SpO2: 100% (18 @ 22:51) (100% - 100%) Wt(kg): --	    Assessment and Plan:   Assessment:  · Assessment		  well nourished female with acute left leg DVT    Plan:  no evidence of PE, but will assess pulsoximetry  lovinox 100 mg SQ given in ER 18 at 01:30  Continue 100 mg BID for 1 mg / kg BID  medicine consult, heme prn  obtain Xa level 4 hours after third dose  check cr level     consult today Wed 18  NST once    reg diet  ambulation  tylenol for pain    L ileofemoral DVT in 34 weeks gravid female.  Discussed with patient and her mother and then Dr. García options of conservative treatment with Lovenox that will probably result in significant post thrombotic syndrome vs more aggressive treatment with pharmacomechanical thrombectomy with precautions of limiting radiation and fluoro time and shielding of fetus.  Will discuss with IR tomorrow.    NPO after MN  IV hydrate
OBGYN: Ricky    CC: LLE swelling and pain    HPI: 27 y.o. female  at 34 weeks pregnant presents with LLE swelling. Medicine consult requested by OBGYN service for eval of DVT. Pt reports increasing LLE swelling and pain since 2 days ago. Over the course of 2 days, pt reports worsening pain. She denies fever, chills, n/v, CP, SOB. Denies prior similar episodes. Pt reports within the last week, she traveled via car from [place] to [place] roughly 2-3 hr car ride. Denies foreign air travel or prolonged bedrest. Denies hx blood clots. Denies hemoptysis, GI or  bleed.    PMH: as above  PSH: denies   Social Hx: denies tobacco, alcohol or drugs  Family Hx: Father-heart disease; no hx blood clots  ROS: per HPI  Allergies: denies    Vital Signs Last 24 Hrs  T(C): 36.6 (2018 22:51), Max: 36.6 (2018 22:51)  T(F): 97.8 (2018 22:51), Max: 97.8 (2018 22:51)  HR: 96 (2018 22:51) (96 - 96)  BP: 136/80 (2018 22:51) (136/80 - 136/80)  BP(mean): --  RR: 18 (2018 22:51) (18 - 18)  SpO2: 100% (2018 22:51) (100% - 100%)    GEN: appears comfortable  Neuro: AAOx3, nonfocal  HEENT: NC/AT, EOMI  Neck: no thyroidmegaly, no JVD  Cardiovascular: S1S2 present, regular rhythm, no murmur  Respiratory: breath sounds normal bilaterally, no wheezing, no rales, no rhonchi  Gastrointestinal: bowel sounds normal, soft, no abdominal tenderness  Musculoskeletal: no muscle tenderness  Extremities: No edema on right. LLE increased calf tenderness and swelling  Skin: No rash
pt seen today in consultation for left leg femoral acute DVT. pt is 34wks pregnant. she presented with left leg swelling and pain in the left groin. she was placed on lovenox and reports with elevation she is less swollen, but has some persistent pain. pt is NAD with no dyspnea or chest pain. A&Ox3. left calf is mildly swollen compared to the right. there is no discoloration. motor function intact.

## 2018-02-09 NOTE — PROGRESS NOTE ADULT - SUBJECTIVE AND OBJECTIVE BOX
HPI:   at 34 weeks gestation followed by Dr García for routine prenatal care noted beginning of left leg swelling on  or . Pt was seen for ultrasound in OB office on 18, but did not mention leg pain. when pain became unbearable, she presented to ER.  Pt denies recent travel other than 1 hour car ride to [place]. There is no family history of blood clots as reinforced by her mother.  Baby is active. No vaginal bleeding,  cramping nor loss of fluid.  Pt was being followed sonographically for low fluid, which she states was adequate on 18. (2018 02:29)      Patient is a 27y old  Female who presents with a chief complaint of left leg pain (2018 02:29)  pt found to have an acute left le DVT.     Consulted by Dr. Lisa García   for VTE prophylaxis, risk stratification, and anticoagulation management.    PAST MEDICAL & SURGICAL HISTORY:  No pertinent past medical history  No significant past surgical history      IMPROVE VTE Risk Score:3    IMPROVE Bleeding Risk Score:0    Falls Risk:   High (  )  Mod (  )  Low ( X )    CRE L: 223.4  18 PT seen at bedside on 2SW.  Discussed her anticoagulation with Lovenox 100mg  sq twice a day.  Informed her she will need to be evaluated by vascular and interventional radiology to make sure she dose not need any procedures now.  states radiology was there already and she states he told her that she is clear as far as he is concerned   Benefits and risks discussed.  Questions answered will reinforce the need.    18 pt seen at bedside.   Pt states swelling inher leg is much better and pain is less.  No need for thrombectomy at this time. will cont on same treatment of lovenox 100mg sq q12h. Pt has no concerns.     Vital Signs Last 24 Hrs  T(C): 36.7 (18 @ 07:30), Max: 37.3 (18 @ 00:01)  T(F): 98.1 (18 @ 07:30), Max: 99.1 (18 @ 00:01)  HR: 97 (18 @ 07:30) (84 - 97)  BP: 117/76 (18 @ 07:30) (114/66 - 124/73)  BP(mean): --  RR: 16 (18 @ 07:30) (16 - 16)  SpO2: 100% (18 @ 07:30) (100% - 100%)  FAMILY HISTORY:  No pertinent family history in first degree relatives    Denies any personal or familial history of clotting or bleeding disorders.    Allergies    No Known Allergies    Intolerances        REVIEW OF SYSTEMS    (  )Fever	     (  )Constipation	(  )SOB				(  )Headache	(  )Dysuria  (  )Chills	     (  )Melena	(  )Dyspnea present on exertion	                    (  )Dizziness                    (  )Polyuria  (  )Nausea	     (  )Hematochezia	(  )Cough			                    (  )Syncope   	(  )Hematuria  (  )Vomiting    (  )Chest Pain	(  )Wheezing			(  )Weakness  (  )Diarrhea     (  )Palpitations	(  )Anorexia			(  )Myalgia    All other review of systems negative: Yes      PHYSICAL EXAM:    Constitutional: Appears Well    Neurological: A& O x 3    Skin: Warm    Respiratory and Thorax: normal effort; Breath sounds: normal; No rales/wheezing/rhonchi  	  Cardiovascular: S1, S2, regular, NMBR	    Gastrointestinal: BS + x 4Q, nontender	    Genitourinary:  Bladder nondistended, nontender    Musculoskeletal:   General Right:   no muscle/joint tenderness,   normal tone, no joint swelling,   ROM: full	    General Left:   no muscle/joint tenderness,   normal tone, + swelling noted 3+ with pain much less.   ROM: limited      Lower extrems:   Right: no calf tenderness              negative kimberley's sign               + pedal pulses    Left:   positive calf tenderness              negative kimberley's sign               + pedal pulses                                    11.5   12.7  )-----------( 252      ( 2018 07:02 )             35.4       02-08    134<L>  |  104  |  12  ----------------------------<  116<H>  3.9   |  22  |  0.61    Ca    8.7      2018 07:02    TPro  7.3  /  Alb  2.5<L>  /  TBili  0.4  /  DBili  x   /  AST  17  /  ALT  15  /  AlkPhos  150<H>  02-07      PT/INR - ( 2018 02:49 )   PT: 10.6 sec;   INR: 0.98 ratio         PTT - ( 2018 02:49 )  PTT:40.1 sec	  			  FINDINGS: left lower extremity doppler 18  Intraluminal echogenicity with lack of color flow and lack of compression   is seen distending the left common femoral, proximal greater saphenous   and femoral veins compatible with an occlusive deep venous thrombosis.   There is no evidence of deep venous thrombosis in the left popliteal or   posterior tibial veins.    The contralateral common femoral vein is patent.    IMPRESSION:  Above the knee left lower extremity deep venous thrombosis.      MEDICATIONS  (STANDING):  enoxaparin Injectable 100 milliGRAM(s) SubCutaneous every 12 hours  sodium chloride 0.9% lock flush 3 milliLiter(s) IV Push every 8 hours        DVT Prophylaxis:  LMWH                   ( x )  Heparin SQ           (  )  Coumadin             (  )  Xarelto                  (  )  Eliquis                   (  )  Venodynes           (  )  Ambulation          ( x )  UFH                       (  )  Contraindicated  (  )

## 2018-02-09 NOTE — CONSULT NOTE ADULT - ASSESSMENT
This is a 27 year old female 34 weeks gestation with acute dvt left le.  Pt has high thrombosis risk an requires anticoagulation treatment dose.    :I discussed plan with Dr Elizondo with placing pt on lovenox 1mg per kg twice a day.  He also recommends interventional radiology and vascular consults.    Plan:  :Lovenox 100mg sq q12h     :daily cbc/bmp  :mobility as pt tolerates.  :thanks for joe bailey f/u
27 y.o. female  at 34 weeks pregnant presents with LLE DVT    #LLE DVT likely pregnancy related (hypercoagulable from pregnancy)  -admitted to OBGYN service  -US noted. Pt currently denies any SOB. No tachycardia on exam.  -suggest lovenox 1mg/kg twice daily if no OB contraindications. Check weight.  -risk of bleed discussed with patient and family  -consider anticoagulation services  -pain control prn  -obtain baseline labs  -will need lovenox teaching    #pregnancy 34 weeks  -management per OB
impression healthy 27-year-old female with no prior personal or family history of DVT, in her 34th week of pregnancy  Now with a left lower extremity DVT.  etiology of patient's DVT is unclear however assessment for possible underlying thrombophilia will have no clinical significance for management over the next several weeks to months as she will require at least 4 months of anticoagulation.  Defer laboratory assessment for thrombophilia  to the outpatient setting.    acute management of DVT in the pregnant patient usually consists of heparin/low molecular weight heparin  I i am comfortable with initially treating patient with the Lovenox 1 mg/kg 2 times a day/every 12 hours which she is currently on.  I am concerned about the extensiveness of the DVT and the fact that the swelling is no better despite bed rest and being on Lovenox.  Concern is a  post phlebitic syndrome with long lasting negative impact on the patient.  Also challenging is how to manage anticoagulation in the peripartum setting particularly given the acute nature of the event and further decision as to having a  with general anesthesia.    Plan: suggested consultation with vascular surgery, interventional radiology,  and maternal fetal high risk pregnancy specialists as the appropriateness and timing of possible thrombo-lysis and minimization of long standing postphlebitic syndrome; As well as management of anticoagulation during  richa-partum period.  As previously stated patient will require Lovenoxat least 3+ months after delivery of the child.  At that point prior to stopping Lovenox  an outpatient thrombophilia aboratory evaluation can be done by my office
left leg acute dvt in the setting of third trimester pregnancy. vinny AC. clinically improving.

## 2018-02-09 NOTE — PROGRESS NOTE ADULT - ASSESSMENT
27F.  admitted 18.   @ 34 weeks gestation.  c/o LLE swelling and pain.  onset of symptoms 2 days prior to admission.  LE venous doppler c/w above the knee LLE DVT.    LLE DVT likely pregnancy related (hypercoagulable from pregnancy).  -US noted. Pt currently denies any SOB. No tachycardia on exam.  -c/w Lovenox 1mg/kg q12h.  -Hematology + VSx consults appreciated  - No thrombectomy  - would need lovenox 3 months post delivery    pregnancy 34 weeks  -management per OB.    DVT prophylaxis.  -LMWH.    poc discussed with pt,  at bedside, team 27F.  admitted 18.   @ 34 weeks gestation.  c/o LLE swelling and pain.  onset of symptoms 2 days prior to admission.  LE venous doppler c/w above the knee LLE DVT.    LLE DVT likely pregnancy related (hypercoagulable from pregnancy).  -US noted. Pt currently denies any SOB. No tachycardia on exam.  -c/w Lovenox 1mg/kg q12h.  -Hematology + VSx consults appreciated  - No thrombectomy  - would need lovenox 3 months post delivery    pregnancy 34 weeks  -management per OB.    Hyponatremia 134: recheck in am    DVT prophylaxis.  -LMWH.    poc discussed with pt,  at bedside, team

## 2018-02-09 NOTE — PROGRESS NOTE ADULT - ASSESSMENT
This is a 27 year old female 34 weeks gestation with acute dvt left le.  Pt has high thrombosis risk an requires anticoagulation treatment dose.    2-8-19:I discussed plan with Dr Elizondo with placing pt on lovenox 1mg per kg twice a day.  He also recommends interventional radiology and vascular consults.    Plan:  :Lovenox 100mg sq q12h     :daily cbc/bmp  :mobility as pt tolerates.  :will sign off case please reconsult if needed.

## 2018-02-09 NOTE — CONSULT NOTE ADULT - PROBLEM SELECTOR RECOMMENDATION 9
at this time, would continue AC as tolerated. add compression for comfort. if AC needs to be held, I would offer a suprarenal IVC filter to be retrieved postpartum. Would avoid pharmacomechanical thrombolysis at this time given the risk of bleeding with tpa and the patients improving symptoms.  We can revisit catheter based therapy in the postpartum period once the upper extent of the dvt is established with cross-sectional non-invasive imaging or if symptoms worsen and the risk-benefit profile shifts in favor of a more aggressive approach.

## 2018-02-10 LAB
ANION GAP SERPL CALC-SCNC: 8 MMOL/L — SIGNIFICANT CHANGE UP (ref 5–17)
BUN SERPL-MCNC: 6 MG/DL — LOW (ref 7–23)
CALCIUM SERPL-MCNC: 8.9 MG/DL — SIGNIFICANT CHANGE UP (ref 8.5–10.1)
CHLORIDE SERPL-SCNC: 106 MMOL/L — SIGNIFICANT CHANGE UP (ref 96–108)
CO2 SERPL-SCNC: 24 MMOL/L — SIGNIFICANT CHANGE UP (ref 22–31)
CREAT SERPL-MCNC: 0.54 MG/DL — SIGNIFICANT CHANGE UP (ref 0.5–1.3)
GLUCOSE SERPL-MCNC: 72 MG/DL — SIGNIFICANT CHANGE UP (ref 70–99)
HCT VFR BLD CALC: 34.2 % — LOW (ref 34.5–45)
HGB BLD-MCNC: 11.1 G/DL — LOW (ref 11.5–15.5)
MCHC RBC-ENTMCNC: 25 PG — LOW (ref 27–34)
MCHC RBC-ENTMCNC: 32.5 GM/DL — SIGNIFICANT CHANGE UP (ref 32–36)
MCV RBC AUTO: 77 FL — LOW (ref 80–100)
PLATELET # BLD AUTO: 239 K/UL — SIGNIFICANT CHANGE UP (ref 150–400)
POTASSIUM SERPL-MCNC: 3.9 MMOL/L — SIGNIFICANT CHANGE UP (ref 3.5–5.3)
POTASSIUM SERPL-SCNC: 3.9 MMOL/L — SIGNIFICANT CHANGE UP (ref 3.5–5.3)
RBC # BLD: 4.44 M/UL — SIGNIFICANT CHANGE UP (ref 3.8–5.2)
RBC # FLD: 14.7 % — HIGH (ref 10.3–14.5)
SODIUM SERPL-SCNC: 138 MMOL/L — SIGNIFICANT CHANGE UP (ref 135–145)
WBC # BLD: 11.3 K/UL — HIGH (ref 3.8–10.5)
WBC # FLD AUTO: 11.3 K/UL — HIGH (ref 3.8–10.5)

## 2018-02-10 RX ORDER — SENNA PLUS 8.6 MG/1
1 TABLET ORAL ONCE
Qty: 0 | Refills: 0 | Status: COMPLETED | OUTPATIENT
Start: 2018-02-10 | End: 2018-02-10

## 2018-02-10 RX ORDER — DOCUSATE SODIUM 100 MG
100 CAPSULE ORAL DAILY
Qty: 0 | Refills: 0 | Status: DISCONTINUED | OUTPATIENT
Start: 2018-02-10 | End: 2018-02-13

## 2018-02-10 RX ADMIN — SODIUM CHLORIDE 3 MILLILITER(S): 9 INJECTION INTRAMUSCULAR; INTRAVENOUS; SUBCUTANEOUS at 22:09

## 2018-02-10 RX ADMIN — Medication 650 MILLIGRAM(S): at 23:29

## 2018-02-10 RX ADMIN — Medication 325 MILLIGRAM(S): at 15:46

## 2018-02-10 RX ADMIN — SENNA PLUS 1 TABLET(S): 8.6 TABLET ORAL at 23:53

## 2018-02-10 RX ADMIN — Medication 100 MILLIGRAM(S): at 23:53

## 2018-02-10 RX ADMIN — ENOXAPARIN SODIUM 100 MILLIGRAM(S): 100 INJECTION SUBCUTANEOUS at 00:54

## 2018-02-10 RX ADMIN — ENOXAPARIN SODIUM 100 MILLIGRAM(S): 100 INJECTION SUBCUTANEOUS at 13:06

## 2018-02-10 NOTE — PROGRESS NOTE ADULT - SUBJECTIVE AND OBJECTIVE BOX
Patient is a 27y old  Female who presents with a chief complaint of left leg pain (2018 02:29)      INTERVAL HPI/OVERNIGHT EVENTS: No acute event overnight. Pt reported mild Lt tight pain at baseline. Denies of any CP/SOB/HA/palpitation/N/V. eating well, + BM. Felt baby moving, no vaginal discharge or bleeding noticed, no abd pain reported.     MEDICATIONS  (STANDING):  enoxaparin Injectable 100 milliGRAM(s) SubCutaneous every 12 hours  sodium chloride 0.9% lock flush 3 milliLiter(s) IV Push every 8 hours    MEDICATIONS  (PRN):  acetaminophen   Tablet. 325 milliGRAM(s) Oral every 4 hours PRN Mild Pain (1 - 3)  acetaminophen   Tablet. 650 milliGRAM(s) Oral every 6 hours PRN Moderate Pain (4 - 6)      Allergies    No Known Allergies    Intolerances          ROS:  CONSTITUTIONAL: No weakness, fevers or chills  EYES/ENT: No visual changes;  No vertigo or throat pain   NECK: No pain or stiffness  RESPIRATORY: No cough, wheezing, hemoptysis; No shortness of breath  CARDIOVASCULAR: No chest pain or palpitations  GASTROINTESTINAL: No abdominal or epigastric pain. No nausea, vomiting, or hematemesis; No diarrhea or constipation. No melena or hematochezia.  GENITOURINARY: No dysuria, frequency or hematuria  NEUROLOGICAL: No numbness or weakness  SKIN: No itching, burning, rashes, or lesions  Vascular: Lt thigh pain and swelling at baseline   All other review of systems is negative unless indicated above.    Vital Signs Last 24 Hrs  T(C): 36.8 (10 Feb 2018 07:45), Max: 36.8 (2018 20:30)  T(F): 98.2 (10 Feb 2018 07:45), Max: 98.3 (2018 20:30)  HR: 81 (10 Feb 2018 07:45) (81 - 94)  BP: 112/69 (10 Feb 2018 07:45) (112/69 - 124/71)  RR: 16 (10 Feb 2018 07:45) (16 - 16)  SpO2: 99% (10 Feb 2018 07:45) (98% - 100%)     @ 07:01  -  -10 @ 07:00  --------------------------------------------------------  IN: 0 mL / OUT: 1300 mL / NET: -1300 mL        Physical Exam:  GENERAL: NAD,  HEAD:  Atraumatic, Normocephalic  EYES: EOMI, conjunctiva and sclera clear  ENMT: Moist mucous membranes,   NECK: Supple, No JVD  NERVOUS SYSTEM:  Alert & Oriented X3, Good concentration; Motor Strength 5/5 B/L upper and lower extremities;  CHEST/LUNG: Clear to percussion bilaterally; No rales, rhonchi, wheezing, or rubs  HEART: Regular rate and rhythm; No murmurs, rubs, or gallops  ABDOMEN: Soft, gravid, Nontender, Nondistended; Bowel sounds present  EXTREMITIES:  2+ Peripheral Pulses. Lt thigh mild tenderness to palpation, no erythema, swelling of Lt thigh  LYMPH: No lymphadenopathy noted  SKIN: No rashes or lesions    LABS:                        11.1   11.3  )-----------( 239      ( 10 Feb 2018 07:20 )             34.2     02-10    138  |  106  |  6<L>  ----------------------------<  72  3.9   |  24  |  0.54    Ca    8.9      10 Feb 2018 07:19        Urinalysis Basic - ( 2018 13:30 )    Color: Krissy / Appearance: Clear / S.020 / pH: x  Gluc: x / Ketone: Large  / Bili: Negative / Urobili: 1 mg/dL   Blood: x / Protein: 15 mg/dL / Nitrite: Negative   Leuk Esterase: Small / RBC: 3-5 /HPF / WBC 3-5   Sq Epi: x / Non Sq Epi: Many / Bacteria: Many              RECENT CULTURES:     @ 07:01  -  10 @ 07:00  --------------------------------------------------------  IN: 0 mL / OUT: 1300 mL / NET: -1300 mL            RADIOLOGY & ADDITIONAL TESTS:

## 2018-02-10 NOTE — PROGRESS NOTE ADULT - ASSESSMENT
27F  @ 34 weeks gestation c/o LLE swelling and pain with LE venous doppler c/w above the knee LLE DVT.

## 2018-02-10 NOTE — PROGRESS NOTE ADULT - ASSESSMENT
27F.  admitted 18.   @ 34 weeks gestation.  c/o LLE swelling and pain.  onset of symptoms 2 days prior to admission.  LE venous doppler c/w above the knee LLE DVT.    LLE DVT likely pregnancy related (hypercoagulable from pregnancy).  -US noted. Pt currently denies any SOB. No tachycardia on exam.  -c/w Lovenox 1mg/kg q12h.  -Hematology + VSx consults appreciated  - No thrombectomy  - would need lovenox 3 months post delivery    pregnancy 34 weeks  -management per OB.    Hyponatremia 134: rechecked , 138 on 2/10, normalized    DVT prophylaxis.  -LMWH.    poc discussed with pt,  team

## 2018-02-10 NOTE — PROGRESS NOTE ADULT - PROBLEM SELECTOR PLAN 1
-current stable, vascular/ hema/ IR/medicine on the case, rec. appriciated, will continue with Lovenox for now. Pt Likely will on 3 months of AC postpartum.   -continue current pain regiment  -follow up with CBC/BMP daily   -continue with symptoms and sign monitor  -NST daily for baby wellbeing

## 2018-02-10 NOTE — PROGRESS NOTE ADULT - SUBJECTIVE AND OBJECTIVE BOX
CC:  Patient is a 27y old  Female who presents with a chief complaint of left leg pain (07 Feb 2018 02:29)    2/9: left leg pain better  no thrombectomy    2/10: leg pain better  has compression stockings now      PHYSICAL EXAM:    Daily     Daily     ICU Vital Signs Last 24 Hrs  T(C): 36.8 (10 Feb 2018 07:45), Max: 36.8 (09 Feb 2018 20:30)  T(F): 98.2 (10 Feb 2018 07:45), Max: 98.3 (09 Feb 2018 20:30)  HR: 81 (10 Feb 2018 07:45) (81 - 94)  BP: 112/69 (10 Feb 2018 07:45) (112/69 - 124/71)  BP(mean): --  ABP: --  ABP(mean): --  RR: 16 (10 Feb 2018 07:45) (16 - 16)  SpO2: 99% (10 Feb 2018 07:45) (98% - 100%)      Constitutional: Well appearing  HEENT: Atraumatic,                         11.1   11.3  )-----------( 239      ( 10 Feb 2018 07:20 )             34.2       CBC Full  -  ( 10 Feb 2018 07:20 )  WBC Count : 11.3 K/uL  Hemoglobin : 11.1 g/dL  Hematocrit : 34.2 %  Platelet Count - Automated : 239 K/uL  Mean Cell Volume : 77.0 fl  Mean Cell Hemoglobin : 25.0 pg  Mean Cell Hemoglobin Concentration : 32.5 gm/dL  Auto Neutrophil # : x  Auto Lymphocyte # : x  Auto Monocyte # : x  Auto Eosinophil # : x  Auto Basophil # : x  Auto Neutrophil % : x  Auto Lymphocyte % : x  Auto Monocyte % : x  Auto Eosinophil % : x  Auto Basophil % : x      02-10    138  |  106  |  6<L>  ----------------------------<  72  3.9   |  24  |  0.54    Ca    8.9      10 Feb 2018 07:19                              MEDICATIONS  (STANDING):  enoxaparin Injectable 100 milliGRAM(s) SubCutaneous every 12 hours  sodium chloride 0.9% lock flush 3 milliLiter(s) IV Push every 8 hours

## 2018-02-11 LAB
ANION GAP SERPL CALC-SCNC: 8 MMOL/L — SIGNIFICANT CHANGE UP (ref 5–17)
BUN SERPL-MCNC: 7 MG/DL — SIGNIFICANT CHANGE UP (ref 7–23)
CALCIUM SERPL-MCNC: 8.4 MG/DL — LOW (ref 8.5–10.1)
CHLORIDE SERPL-SCNC: 105 MMOL/L — SIGNIFICANT CHANGE UP (ref 96–108)
CO2 SERPL-SCNC: 24 MMOL/L — SIGNIFICANT CHANGE UP (ref 22–31)
CREAT SERPL-MCNC: 0.43 MG/DL — LOW (ref 0.5–1.3)
GLUCOSE SERPL-MCNC: 74 MG/DL — SIGNIFICANT CHANGE UP (ref 70–99)
HCT VFR BLD CALC: 32 % — LOW (ref 34.5–45)
HGB BLD-MCNC: 10.6 G/DL — LOW (ref 11.5–15.5)
MCHC RBC-ENTMCNC: 25.2 PG — LOW (ref 27–34)
MCHC RBC-ENTMCNC: 33 GM/DL — SIGNIFICANT CHANGE UP (ref 32–36)
MCV RBC AUTO: 76.2 FL — LOW (ref 80–100)
PLATELET # BLD AUTO: 261 K/UL — SIGNIFICANT CHANGE UP (ref 150–400)
POTASSIUM SERPL-MCNC: 3.8 MMOL/L — SIGNIFICANT CHANGE UP (ref 3.5–5.3)
POTASSIUM SERPL-SCNC: 3.8 MMOL/L — SIGNIFICANT CHANGE UP (ref 3.5–5.3)
RBC # BLD: 4.2 M/UL — SIGNIFICANT CHANGE UP (ref 3.8–5.2)
RBC # FLD: 14.6 % — HIGH (ref 10.3–14.5)
SODIUM SERPL-SCNC: 137 MMOL/L — SIGNIFICANT CHANGE UP (ref 135–145)
WBC # BLD: 10.5 K/UL — SIGNIFICANT CHANGE UP (ref 3.8–10.5)
WBC # FLD AUTO: 10.5 K/UL — SIGNIFICANT CHANGE UP (ref 3.8–10.5)

## 2018-02-11 RX ADMIN — ENOXAPARIN SODIUM 100 MILLIGRAM(S): 100 INJECTION SUBCUTANEOUS at 01:09

## 2018-02-11 RX ADMIN — Medication 100 MILLIGRAM(S): at 12:33

## 2018-02-11 RX ADMIN — SODIUM CHLORIDE 3 MILLILITER(S): 9 INJECTION INTRAMUSCULAR; INTRAVENOUS; SUBCUTANEOUS at 15:39

## 2018-02-11 RX ADMIN — Medication 650 MILLIGRAM(S): at 00:29

## 2018-02-11 RX ADMIN — ENOXAPARIN SODIUM 100 MILLIGRAM(S): 100 INJECTION SUBCUTANEOUS at 12:33

## 2018-02-11 RX ADMIN — SODIUM CHLORIDE 3 MILLILITER(S): 9 INJECTION INTRAMUSCULAR; INTRAVENOUS; SUBCUTANEOUS at 23:49

## 2018-02-11 NOTE — PROGRESS NOTE ADULT - SUBJECTIVE AND OBJECTIVE BOX
Patient is a 27y old  Female who presents with a chief complaint of left leg pain (07 Feb 2018 02:29)      INTERVAL HPI/OVERNIGHT EVENTS: No acute event overnight. Pt reported mild Lt tight pain at baseline. Denies of any CP/SOB/HA/palpitation/N/V. eating well, + BM. Felt baby moving, no vaginal discharge or bleeding noticed, no abd pain reported.     MEDICATIONS  (STANDING):  docusate sodium 100 milliGRAM(s) Oral daily  enoxaparin Injectable 100 milliGRAM(s) SubCutaneous every 12 hours  sodium chloride 0.9% lock flush 3 milliLiter(s) IV Push every 8 hours    MEDICATIONS  (PRN):  acetaminophen   Tablet. 325 milliGRAM(s) Oral every 4 hours PRN Mild Pain (1 - 3)  acetaminophen   Tablet. 650 milliGRAM(s) Oral every 6 hours PRN Moderate Pain (4 - 6)        Allergies    No Known Allergies    Intolerances          ROS:  CONSTITUTIONAL: No weakness, fevers or chills  EYES/ENT: No visual changes;  No vertigo or throat pain   NECK: No pain or stiffness  RESPIRATORY: No cough, wheezing, hemoptysis; No shortness of breath  CARDIOVASCULAR: No chest pain or palpitations  GASTROINTESTINAL: No abdominal or epigastric pain. No nausea, vomiting, or hematemesis; No diarrhea or constipation. No melena or hematochezia.  GENITOURINARY: No dysuria, frequency or hematuria  NEUROLOGICAL: No numbness or weakness  SKIN: No itching, burning, rashes, or lesions  Vascular: Lt thigh pain and swelling at baseline   All other review of systems is negative unless indicated above.    Vital Signs Last 24 Hrs  T(C): 36.8 (11 Feb 2018 07:41), Max: 36.8 (11 Feb 2018 07:41)  T(F): 98.2 (11 Feb 2018 07:41), Max: 98.2 (11 Feb 2018 07:41)  HR: 79 (11 Feb 2018 07:41) (79 - 91)  BP: 104/58 (11 Feb 2018 07:41) (104/58 - 123/70)  BP(mean): 86 (11 Feb 2018 00:05) (86 - 86)  RR: 16 (11 Feb 2018 07:41) (15 - 16)  SpO2: 98% (11 Feb 2018 07:41) (98% - 100%)        Physical Exam:  GENERAL: NAD,  HEAD:  Atraumatic, Normocephalic  EYES: EOMI, conjunctiva and sclera clear  ENMT: Moist mucous membranes,   NECK: Supple, No JVD  NERVOUS SYSTEM:  Alert & Oriented X3, Good concentration; Motor Strength 5/5 B/L upper and lower extremities;  CHEST/LUNG: Clear to percussion bilaterally; No rales, rhonchi, wheezing, or rubs  HEART: Regular rate and rhythm; No murmurs, rubs, or gallops  ABDOMEN: Soft, gravid, Nontender, Nondistended; Bowel sounds present  EXTREMITIES:  2+ Peripheral Pulses. Lt thigh mild tenderness to palpation, no erythema, swelling of Lt thigh  LYMPH: No lymphadenopathy noted  SKIN: No rashes or lesions       LABS:                        10.6   10.5  )-----------( 261      ( 11 Feb 2018 06:49 )             32.0     02-11    137  |  105  |  7   ----------------------------<  74  3.8   |  24  |  0.43<L>    Ca    8.4<L>      11 Feb 2018 06:49                        RADIOLOGY & ADDITIONAL TESTS:

## 2018-02-11 NOTE — PROGRESS NOTE ADULT - SUBJECTIVE AND OBJECTIVE BOX
leg edema improved but she still notes discomfort of thigh and groin    no SOB or dyspnea    PE  thigh and calf softer; no phlegmasia    Dr. Newsome's note read and agreed    consider thrombectomy when early delivery    MEDICATIONS  (STANDING):  docusate sodium 100 milliGRAM(s) Oral daily  enoxaparin Injectable 100 milliGRAM(s) SubCutaneous every 12 hours  sodium chloride 0.9% lock flush 3 milliLiter(s) IV Push every 8 hours    MEDICATIONS  (PRN):  acetaminophen   Tablet. 325 milliGRAM(s) Oral every 4 hours PRN Mild Pain (1 - 3)  acetaminophen   Tablet. 650 milliGRAM(s) Oral every 6 hours PRN Moderate Pain (4 - 6)      Allergies    No Known Allergies    Intolerances        Flatus: [ ] YES [ ] NO             Bowel Movement: [ ] YES [ ] NO  Pain (0-10):            Pain Control Adequate: [ ] YES [ ] NO  Nausea: [ ] YES [ ] NO            Vomiting: [ ] YES [ ] NO  Diarrhea: [ ] YES [ ] NO         Constipation: [ ] YES [ ] NO     Chest Pain: [ ] YES [ ] NO    SOB:  [ ] YES [ ] NO    Vital Signs Last 24 Hrs  T(C): 36.8 (11 Feb 2018 07:41), Max: 36.8 (11 Feb 2018 07:41)  T(F): 98.2 (11 Feb 2018 07:41), Max: 98.2 (11 Feb 2018 07:41)  HR: 79 (11 Feb 2018 07:41) (79 - 91)  BP: 104/58 (11 Feb 2018 07:41) (104/58 - 123/70)  BP(mean): 86 (11 Feb 2018 00:05) (86 - 86)  RR: 16 (11 Feb 2018 07:41) (15 - 16)  SpO2: 98% (11 Feb 2018 07:41) (98% - 100%)    I&O's Summary      Physical Exam:  General: NAD, resting comfortably  Pulmonary: normal resp effort, CTA-B  Cardiovascular: NSR  Abdominal: soft, NT/ND  Extremities: WWP, normal strength  Neuro: A/O x 3, CNs II-XII grossly intact, normal motor/sensation, no focal deficits  Pulses:   Right:                                                                          Left:  FEM [ ]2+ [ ]1+ [ ]doppler                                             FEM [ ]2+ [ ]1+ [ ]doppler    POP [ ]2+ [ ]1+ [ ]doppler                                             POP [ ]2+ [ ]1+ [ ]doppler    DP [ ]2+ [ ]1+ [ ]doppler                                                DP [ ]2+ [ ]1+ [ ]doppler  PT[ ]2+ [ ]1+ [ ]doppler                                                  PT [ ]2+ [ ]1+ [ ]doppler    LABS:                        10.6   10.5  )-----------( 261      ( 11 Feb 2018 06:49 )             32.0     02-11    137  |  105  |  7   ----------------------------<  74  3.8   |  24  |  0.43<L>    Ca    8.4<L>      11 Feb 2018 06:49            CAPILLARY BLOOD GLUCOSE          RADIOLOGY & ADDITIONAL TESTS:

## 2018-02-11 NOTE — PROGRESS NOTE ADULT - SUBJECTIVE AND OBJECTIVE BOX
patient seen during monitoring yesterday  Clinically stable, left lower extremity swelling not worse; possibly modestly improved  Case reviewed with vascular surgery Dr. Pritchard  Thrombectomy on hold until after delivery  Plan continue anticoagulation with therapeutic dose of Lovenox  Based on plans by obstetrics should change over to unfractionated heparin use  prior to  the delivery.  this should be coordinated with obstetrics/ anesthesia etc.  In the immediate postpartum period must /continue anticoagulation and would recommend this for a minimum of 3 additional months.  During  that time, as an outpatient, labs can be sent off to rule out underlying thrombophilia  long-term treatment will depend on results of this assessment and patient's clinical status

## 2018-02-11 NOTE — PROGRESS NOTE ADULT - ASSESSMENT
27F.  admitted 18.   @ 34 weeks gestation.  c/o LLE swelling and pain.  onset of symptoms 2 days prior to admission.  LE venous doppler c/w above the knee LLE DVT.    LLE DVT likely pregnancy related (hypercoagulable from pregnancy).  -US noted. Pt currently denies any SOB. No tachycardia on exam.  -c/w Lovenox 1mg/kg q12h.  -Hematology + VSx consults appreciated  - possible  thrombectomy after delivery vs Lovenox 3 months post delivery    pregnancy 34 weeks  -management per OB.    Hyponatremia 134: rechecked , 138 on 2/10, normalized    DVT prophylaxis.  -LMWH.    poc discussed with pt,  team

## 2018-02-11 NOTE — PROGRESS NOTE ADULT - ATTENDING COMMENTS
Pt seen and examined with Dr. Elijah Pritchard.  After he reviewed literature, and conferred with Dr Newsome, will defer excision of clot until post delivery.    tylenol for pain relief  No signs of PE.  Dr Pritchard describes filter above renal arteries only if the is evidence of clot.  Will coordinate with  medicine timing of delivery. Questioning need for steroids for late pre-term delivery if anything changes.  at 36 weeks, pt will need to be changed from lovinox to therapeutic heparin. Will ask Heme for dosing.  Factor Xa showed therapeutic level in past.

## 2018-02-11 NOTE — PROGRESS NOTE ADULT - SUBJECTIVE AND OBJECTIVE BOX
[facility] [id number]    Patient is a 27y old  Female who presents with a chief complaint of left leg pain (2018 02:29)  Her symptoms began 18.  she was diagnosed with left leg DVT and noted to have a markedly swollen left leg.  Pt currently has compression stockings.  She still has pain in left leg      HPI:   at 34 weeks gestation followed by Dr Rubio for routine prenatal care noted beginning of left leg swelling on  or . Pt was seen for ultrasound in OB office on 18, but did not mention leg pain. when pain became unbearable, she presented to ER.  Pt denies recent travel other than 1 hour car ride to [place]. There is no family history of blood clots as reinforced by her mother.  Baby is active. No vaginal bleeding,  cramping nor loss of fluid.  Pt was being followed sonographically for low fluid, which she states was adequate on Tues 18. (2018 02:29)      REVIEW OF SYSTEMS:  CONSTITUTIONAL: No weakness, fevers or chills  EYES/ENT: No visual changes;  No vertigo or throat pain   NECK: No pain or stiffness  RESPIRATORY: No cough, wheezing, hemoptysis; No shortness of breath  CARDIOVASCULAR: No chest pain or palpitations  GASTROINTESTINAL: No abdominal or epigastric pain. No nausea, vomiting, or hematemesis; No diarrhea or constipation. No melena or hematochezia.  GENITOURINARY: No dysuria, frequency or hematuria  NEUROLOGICAL: No numbness or weakness  SKIN: No itching, burning, rashes, or lesions   All other review of systems is negative unless indicated above    MEDICATIONS  (STANDING):  docusate sodium 100 milliGRAM(s) Oral daily  enoxaparin Injectable 100 milliGRAM(s) SubCutaneous every 12 hours  sodium chloride 0.9% lock flush 3 milliLiter(s) IV Push every 8 hours    MEDICATIONS  (PRN):  acetaminophen   Tablet. 325 milliGRAM(s) Oral every 4 hours PRN Mild Pain (1 - 3)  acetaminophen   Tablet. 650 milliGRAM(s) Oral every 6 hours PRN Moderate Pain (4 - 6)      Allergies    No Known Allergies    Intolerances        PAST MEDICAL & SURGICAL HISTORY:  No pertinent past medical history  No significant past surgical history      OB/GYN HISTORY:                                            FAMILY HISTORY:  No pertinent family history in first degree relatives      SOCIAL HISTORY:    Vital Signs Last 24 Hrs  T(C): 36.8 (2018 07:41), Max: 36.8 (2018 07:41)  T(F): 98.2 (2018 07:41), Max: 98.2 (2018 07:41)  HR: 79 (2018 07:41) (79 - 91)  BP: 104/58 (2018 07:41) (104/58 - 123/70)  BP(mean): 86 (2018 00:05) (86 - 86)  RR: 16 (2018 07:41) (15 - 16)  SpO2: 98% (2018 07:41) (98% - 100%)    PHYSICAL EXAM:  Constitutional: NAD, awake and alert, well-developed  HEENT: PERR, EOMI, Normal Hearing, MMM  Neck: Soft and supple, No LAD, No JVD  Respiratory: Breath sounds are clear bilaterally, No wheezing, rales or rhonchi  Cardiovascular: S1 and S2, regular rate and rhythm, no Murmurs, gallops or rubs  Gastrointestinal: Bowel Sounds present, soft, nontender, nondistended, no guarding, no rebound  Extremities: No peripheral edema  Vascular: 2+ peripheral pulses  Neurological: A/O x 3, no focal deficits  Musculoskeletal: 5/5 strength b/l upper and lower extremities  Skin: No rashes    LABS:  Pregnancy Test:                        10.6   10.5  )-----------( 261      ( 2018 06:49 )             32.0     02-11    137  |  105  |  7   ----------------------------<  74  3.8   |  24  |  0.43<L>    Ca    8.4<L>      2018 06:49      I&O's Detail          [facility] AD[facility]  27[facility]  [facility] [id number]    Patient is a 27y old  Female who presents with a chief complaint of left leg pain (2018 02:29)  Her symptoms began 18.  she was diagnosed with left leg DVT and noted to have a markedly swollen left leg.  Pt currently has compression stockings.  She still has pain in left leg      HPI:   at 35 weeks gestation followed by Dr Rubio for routine prenatal care noted beginning of left leg swelling on  or . Pt was seen for ultrasound in OB office on 18, but did not mention leg pain. when pain became unbearable, she presented to ER.  Pt denies recent travel other than 1 hour car ride to [place]. There is no family history of blood clots as reinforced by her mother.  Baby is active. No vaginal bleeding,  cramping nor loss of fluid.  Pt was being followed sonographically for low fluid, which she states was adequate on Tues 18. (2018 02:29)      REVIEW OF SYSTEMS:  CONSTITUTIONAL: No weakness, fevers or chills  EYES/ENT: No visual changes;  No vertigo or throat pain   NECK: No pain or stiffness  RESPIRATORY: No cough, wheezing, hemoptysis; No shortness of breath  CARDIOVASCULAR: No chest pain or palpitations  GASTROINTESTINAL: No abdominal or epigastric pain. No nausea, vomiting, or hematemesis; No diarrhea or constipation. No melena or hematochezia.  GENITOURINARY: No dysuria, frequency or hematuria  NEUROLOGICAL: No numbness or weakness  SKIN: No itching, burning, rashes, or lesions   All other review of systems is negative unless indicated above    MEDICATIONS  (STANDING):  docusate sodium 100 milliGRAM(s) Oral daily  enoxaparin Injectable 100 milliGRAM(s) SubCutaneous every 12 hours  sodium chloride 0.9% lock flush 3 milliLiter(s) IV Push every 8 hours    MEDICATIONS  (PRN):  acetaminophen   Tablet. 325 milliGRAM(s) Oral every 4 hours PRN Mild Pain (1 - 3)  acetaminophen   Tablet. 650 milliGRAM(s) Oral every 6 hours PRN Moderate Pain (4 - 6)      Allergies    No Known Allergies    Intolerances        PAST MEDICAL & SURGICAL HISTORY:  No pertinent past medical history  No significant past surgical history      OB/GYN HISTORY:                                            FAMILY HISTORY:  No pertinent family history in first degree relatives      SOCIAL HISTORY:    Vital Signs Last 24 Hrs  T(C): 36.8 (2018 07:41), Max: 36.8 (2018 07:41)  T(F): 98.2 (2018 07:41), Max: 98.2 (2018 07:41)  HR: 79 (2018 07:41) (79 - 91)  BP: 104/58 (2018 07:41) (104/58 - 123/70)  BP(mean): 86 (2018 00:05) (86 - 86)  RR: 16 (2018 07:41) (15 - 16)  SpO2: 98% (2018 07:41) (98% - 100%)    PHYSICAL EXAM:  Constitutional: NAD, awake and alert, well-developed  HEENT: PERR, EOMI, Normal Hearing, MMM  Neck: Soft and supple, No LAD, No JVD  Respiratory: Breath sounds are clear bilaterally, No wheezing, rales or rhonchi  Cardiovascular: S1 and S2, regular rate and rhythm, no Murmurs, gallops or rubs  Gastrointestinal: Bowel Sounds present, soft, nontender, nondistended, no guarding, no rebound  Extremities: No peripheral edema  Vascular: 2+ peripheral pulses  Neurological: A/O x 3, no focal deficits  Musculoskeletal: 5/5 strength b/l upper and lower extremities  Skin: No rashes    LABS:  Pregnancy Test:                        10.6   10.5  )-----------( 261      ( 2018 06:49 )             32.0     02-11    137  |  105  |  7   ----------------------------<  74  3.8   |  24  |  0.43<L>    Ca    8.4<L>      2018 06:49      I&O's Detail          RADIOLOGY & ADDITIONAL STUDIES:

## 2018-02-11 NOTE — PROGRESS NOTE ADULT - PROBLEM SELECTOR PLAN 1
-current stable, vascular/ hema/ IR/medicine on the case, rec. appreciated, will continue with Lovenox for now, might switch to heparin, per vascular, will consider thrombectomy when early delivery .  Pt Likely will on 3 months of AC postpartum.   -continue current pain regiment  -follow up with CBC/BMP daily   -continue with symptoms and sign monitor  -NST daily for baby wellbeing

## 2018-02-11 NOTE — PROGRESS NOTE ADULT - SUBJECTIVE AND OBJECTIVE BOX
CC:  Patient is a 27y old  Female who presents with a chief complaint of left leg pain (07 Feb 2018 02:29)    2/9: left leg pain better  no thrombectomy    2/10: leg pain better  has compression stockings now    2/11: decreased pain in leg      PHYSICAL EXAM:    Daily     Daily     ICU Vital Signs Last 24 Hrs  T(C): 36.8 (11 Feb 2018 07:41), Max: 36.8 (11 Feb 2018 07:41)  T(F): 98.2 (11 Feb 2018 07:41), Max: 98.2 (11 Feb 2018 07:41)  HR: 79 (11 Feb 2018 07:41) (79 - 91)  BP: 104/58 (11 Feb 2018 07:41) (104/58 - 123/70)  BP(mean): 86 (11 Feb 2018 00:05) (86 - 86)  ABP: --  ABP(mean): --  RR: 16 (11 Feb 2018 07:41) (15 - 16)  SpO2: 98% (11 Feb 2018 07:41) (98% - 100%)      Constitutional: Well appearing  HEENT: Atraumatic,   Respiratory: Breath Sounds normal, no rhonchi/wheeze  Cardiovascular: N S1S2;   Extremities: left leg swollen from DVT  Neurological: AAO x 3,                        10.6   10.5  )-----------( 261      ( 11 Feb 2018 06:49 )             32.0       CBC Full  -  ( 11 Feb 2018 06:49 )  WBC Count : 10.5 K/uL  Hemoglobin : 10.6 g/dL  Hematocrit : 32.0 %  Platelet Count - Automated : 261 K/uL  Mean Cell Volume : 76.2 fl  Mean Cell Hemoglobin : 25.2 pg  Mean Cell Hemoglobin Concentration : 33.0 gm/dL  Auto Neutrophil # : x  Auto Lymphocyte # : x  Auto Monocyte # : x  Auto Eosinophil # : x  Auto Basophil # : x  Auto Neutrophil % : x  Auto Lymphocyte % : x  Auto Monocyte % : x  Auto Eosinophil % : x  Auto Basophil % : x      02-11    137  |  105  |  7   ----------------------------<  74  3.8   |  24  |  0.43<L>    Ca    8.4<L>      11 Feb 2018 06:49                              MEDICATIONS  (STANDING):  docusate sodium 100 milliGRAM(s) Oral daily  enoxaparin Injectable 100 milliGRAM(s) SubCutaneous every 12 hours  sodium chloride 0.9% lock flush 3 milliLiter(s) IV Push every 8 hours

## 2018-02-12 LAB
ANION GAP SERPL CALC-SCNC: 8 MMOL/L — SIGNIFICANT CHANGE UP (ref 5–17)
BLD GP AB SCN SERPL QL: SIGNIFICANT CHANGE UP
BUN SERPL-MCNC: 7 MG/DL — SIGNIFICANT CHANGE UP (ref 7–23)
CALCIUM SERPL-MCNC: 8.4 MG/DL — LOW (ref 8.5–10.1)
CHLORIDE SERPL-SCNC: 105 MMOL/L — SIGNIFICANT CHANGE UP (ref 96–108)
CO2 SERPL-SCNC: 23 MMOL/L — SIGNIFICANT CHANGE UP (ref 22–31)
CREAT SERPL-MCNC: 0.48 MG/DL — LOW (ref 0.5–1.3)
GLUCOSE SERPL-MCNC: 83 MG/DL — SIGNIFICANT CHANGE UP (ref 70–99)
HCT VFR BLD CALC: 33.2 % — LOW (ref 34.5–45)
HGB BLD-MCNC: 10.8 G/DL — LOW (ref 11.5–15.5)
MCHC RBC-ENTMCNC: 24.8 PG — LOW (ref 27–34)
MCHC RBC-ENTMCNC: 32.6 GM/DL — SIGNIFICANT CHANGE UP (ref 32–36)
MCV RBC AUTO: 76.1 FL — LOW (ref 80–100)
PLATELET # BLD AUTO: 267 K/UL — SIGNIFICANT CHANGE UP (ref 150–400)
POTASSIUM SERPL-MCNC: 3.9 MMOL/L — SIGNIFICANT CHANGE UP (ref 3.5–5.3)
POTASSIUM SERPL-SCNC: 3.9 MMOL/L — SIGNIFICANT CHANGE UP (ref 3.5–5.3)
RBC # BLD: 4.37 M/UL — SIGNIFICANT CHANGE UP (ref 3.8–5.2)
RBC # FLD: 14.6 % — HIGH (ref 10.3–14.5)
SODIUM SERPL-SCNC: 136 MMOL/L — SIGNIFICANT CHANGE UP (ref 135–145)
TYPE + AB SCN PNL BLD: SIGNIFICANT CHANGE UP
WBC # BLD: 8.9 K/UL — SIGNIFICANT CHANGE UP (ref 3.8–10.5)
WBC # FLD AUTO: 8.9 K/UL — SIGNIFICANT CHANGE UP (ref 3.8–10.5)

## 2018-02-12 RX ADMIN — ENOXAPARIN SODIUM 100 MILLIGRAM(S): 100 INJECTION SUBCUTANEOUS at 13:35

## 2018-02-12 RX ADMIN — Medication 100 MILLIGRAM(S): at 13:35

## 2018-02-12 RX ADMIN — Medication 12 MILLIGRAM(S): at 11:05

## 2018-02-12 RX ADMIN — ENOXAPARIN SODIUM 100 MILLIGRAM(S): 100 INJECTION SUBCUTANEOUS at 00:53

## 2018-02-12 RX ADMIN — SODIUM CHLORIDE 3 MILLILITER(S): 9 INJECTION INTRAMUSCULAR; INTRAVENOUS; SUBCUTANEOUS at 16:56

## 2018-02-12 NOTE — PROGRESS NOTE ADULT - SUBJECTIVE AND OBJECTIVE BOX
CC:  Patient is a 27y old  Female who presents with a chief complaint of left leg pain (07 Feb 2018 02:29)    2/9: left leg pain better  no thrombectomy    2/10: leg pain better  has compression stockings now    2/11: decreased pain in leg    2/12: leg pain same      PHYSICAL EXAM:    Daily     Daily     ICU Vital Signs Last 24 Hrs  T(C): 36.3 (12 Feb 2018 07:36), Max: 36.8 (12 Feb 2018 04:10)  T(F): 97.4 (12 Feb 2018 07:36), Max: 98.2 (12 Feb 2018 04:10)  HR: 77 (12 Feb 2018 07:36) (77 - 92)  BP: 111/65 (12 Feb 2018 07:36) (111/65 - 122/60)  BP(mean): --  ABP: --  ABP(mean): --  RR: 16 (12 Feb 2018 07:36) (16 - 16)  SpO2: 100% (12 Feb 2018 07:36) (98% - 100%)      Constitutional: Well appearing  HEENT: Atraumatic,                        10.8   8.9   )-----------( 267      ( 12 Feb 2018 07:26 )             33.2       CBC Full  -  ( 12 Feb 2018 07:26 )  WBC Count : 8.9 K/uL  Hemoglobin : 10.8 g/dL  Hematocrit : 33.2 %  Platelet Count - Automated : 267 K/uL  Mean Cell Volume : 76.1 fl  Mean Cell Hemoglobin : 24.8 pg  Mean Cell Hemoglobin Concentration : 32.6 gm/dL  Auto Neutrophil # : x  Auto Lymphocyte # : x  Auto Monocyte # : x  Auto Eosinophil # : x  Auto Basophil # : x  Auto Neutrophil % : x  Auto Lymphocyte % : x  Auto Monocyte % : x  Auto Eosinophil % : x  Auto Basophil % : x      02-12    136  |  105  |  7   ----------------------------<  83  3.9   |  23  |  0.48<L>    Ca    8.4<L>      12 Feb 2018 07:26                              MEDICATIONS  (STANDING):  docusate sodium 100 milliGRAM(s) Oral daily  enoxaparin Injectable 100 milliGRAM(s) SubCutaneous every 12 hours  sodium chloride 0.9% lock flush 3 milliLiter(s) IV Push every 8 hours

## 2018-02-12 NOTE — PROGRESS NOTE ADULT - SUBJECTIVE AND OBJECTIVE BOX
Patient has no new complaints, Left leg elevated and swelling appears slightly decreased. Vascular surgery concerns are permanent damage to the vein leading to permanent swelling of her left leg. will reconsult [facility] regarding early delivery aftersteroid administration.

## 2018-02-12 NOTE — PROGRESS NOTE ADULT - ASSESSMENT
27F.  admitted 18.   @ 34 weeks gestation.  c/o LLE swelling and pain.  onset of symptoms 2 days prior to admission.  LE venous doppler c/w above the knee LLE DVT.    LLE DVT likely pregnancy related (hypercoagulable from pregnancy).  -US noted. Pt currently denies any SOB. No tachycardia on exam.  -c/w Lovenox 1mg/kg q12h.  -Hematology + VSx consults appreciated  - possible  thrombectomy after delivery vs Lovenox 3 months post delivery    pregnancy 34 weeks  -management per OB  started on steroid.    Hyponatremia 134: rechecked , 138 on 2/10, normalized    DVT prophylaxis.  -LMWH.    poc discussed with pt,  team

## 2018-02-13 ENCOUNTER — INPATIENT (INPATIENT)
Facility: HOSPITAL | Age: 28
LOS: 3 days | Discharge: HOME | DRG: 781 | End: 2018-02-17
Attending: OBSTETRICS & GYNECOLOGY | Admitting: OBSTETRICS & GYNECOLOGY
Payer: MEDICAID

## 2018-02-13 VITALS
HEART RATE: 80 BPM | DIASTOLIC BLOOD PRESSURE: 72 MMHG | SYSTOLIC BLOOD PRESSURE: 115 MMHG | OXYGEN SATURATION: 98 % | RESPIRATION RATE: 17 BRPM | TEMPERATURE: 98 F

## 2018-02-13 VITALS
TEMPERATURE: 98 F | HEART RATE: 92 BPM | DIASTOLIC BLOOD PRESSURE: 77 MMHG | OXYGEN SATURATION: 100 % | SYSTOLIC BLOOD PRESSURE: 116 MMHG | RESPIRATION RATE: 16 BRPM

## 2018-02-13 DIAGNOSIS — Z34.00 ENCOUNTER FOR SUPERVISION OF NORMAL FIRST PREGNANCY, UNSPECIFIED TRIMESTER: ICD-10-CM

## 2018-02-13 LAB
ALBUMIN SERPL ELPH-MCNC: 2.9 G/DL — LOW (ref 3.3–5)
ALP SERPL-CCNC: 139 U/L — HIGH (ref 40–120)
ALT FLD-CCNC: 31 U/L RC — SIGNIFICANT CHANGE UP (ref 10–45)
ANION GAP SERPL CALC-SCNC: 13 MMOL/L — SIGNIFICANT CHANGE UP (ref 5–17)
AST SERPL-CCNC: 34 U/L — SIGNIFICANT CHANGE UP (ref 10–40)
BILIRUB SERPL-MCNC: 0.2 MG/DL — SIGNIFICANT CHANGE UP (ref 0.2–1.2)
BLD GP AB SCN SERPL QL: NEGATIVE — SIGNIFICANT CHANGE UP
BUN SERPL-MCNC: 8 MG/DL — SIGNIFICANT CHANGE UP (ref 7–23)
CALCIUM SERPL-MCNC: 9.2 MG/DL — SIGNIFICANT CHANGE UP (ref 8.4–10.5)
CHLORIDE SERPL-SCNC: 100 MMOL/L — SIGNIFICANT CHANGE UP (ref 96–108)
CO2 SERPL-SCNC: 23 MMOL/L — SIGNIFICANT CHANGE UP (ref 22–31)
CREAT SERPL-MCNC: 0.51 MG/DL — SIGNIFICANT CHANGE UP (ref 0.5–1.3)
GLUCOSE SERPL-MCNC: 164 MG/DL — HIGH (ref 70–99)
HCT VFR BLD CALC: 36 % — SIGNIFICANT CHANGE UP (ref 34.5–45)
HGB BLD-MCNC: 12.1 G/DL — SIGNIFICANT CHANGE UP (ref 11.5–15.5)
MCHC RBC-ENTMCNC: 26.1 PG — LOW (ref 27–34)
MCHC RBC-ENTMCNC: 33.5 GM/DL — SIGNIFICANT CHANGE UP (ref 32–36)
MCV RBC AUTO: 77.7 FL — LOW (ref 80–100)
PLATELET # BLD AUTO: 314 K/UL — SIGNIFICANT CHANGE UP (ref 150–400)
POTASSIUM SERPL-MCNC: 4.4 MMOL/L — SIGNIFICANT CHANGE UP (ref 3.5–5.3)
POTASSIUM SERPL-SCNC: 4.4 MMOL/L — SIGNIFICANT CHANGE UP (ref 3.5–5.3)
PROT SERPL-MCNC: 7.3 G/DL — SIGNIFICANT CHANGE UP (ref 6–8.3)
RBC # BLD: 4.63 M/UL — SIGNIFICANT CHANGE UP (ref 3.8–5.2)
RBC # FLD: 14.6 % — HIGH (ref 10.3–14.5)
RH IG SCN BLD-IMP: POSITIVE — SIGNIFICANT CHANGE UP
SODIUM SERPL-SCNC: 136 MMOL/L — SIGNIFICANT CHANGE UP (ref 135–145)
WBC # BLD: 10 K/UL — SIGNIFICANT CHANGE UP (ref 3.8–10.5)
WBC # FLD AUTO: 10 K/UL — SIGNIFICANT CHANGE UP (ref 3.8–10.5)

## 2018-02-13 RX ORDER — ACETAMINOPHEN 500 MG
975 TABLET ORAL EVERY 6 HOURS
Qty: 0 | Refills: 0 | Status: DISCONTINUED | OUTPATIENT
Start: 2018-02-13 | End: 2018-02-17

## 2018-02-13 RX ORDER — ENOXAPARIN SODIUM 100 MG/ML
100 INJECTION SUBCUTANEOUS EVERY 12 HOURS
Qty: 0 | Refills: 0 | Status: DISCONTINUED | OUTPATIENT
Start: 2018-02-14 | End: 2018-02-15

## 2018-02-13 RX ORDER — FOLIC ACID 0.8 MG
1 TABLET ORAL DAILY
Qty: 0 | Refills: 0 | Status: DISCONTINUED | OUTPATIENT
Start: 2018-02-13 | End: 2018-02-17

## 2018-02-13 RX ORDER — FERROUS SULFATE 325(65) MG
325 TABLET ORAL DAILY
Qty: 0 | Refills: 0 | Status: DISCONTINUED | OUTPATIENT
Start: 2018-02-13 | End: 2018-02-17

## 2018-02-13 RX ADMIN — Medication 1 MILLIGRAM(S): at 17:18

## 2018-02-13 RX ADMIN — ENOXAPARIN SODIUM 100 MILLIGRAM(S): 100 INJECTION SUBCUTANEOUS at 00:49

## 2018-02-13 RX ADMIN — Medication 12 MILLIGRAM(S): at 11:28

## 2018-02-13 RX ADMIN — ENOXAPARIN SODIUM 100 MILLIGRAM(S): 100 INJECTION SUBCUTANEOUS at 13:17

## 2018-02-13 RX ADMIN — Medication 100 MILLIGRAM(S): at 11:28

## 2018-02-13 RX ADMIN — Medication 325 MILLIGRAM(S): at 17:18

## 2018-02-13 NOTE — PROGRESS NOTE ADULT - SUBJECTIVE AND OBJECTIVE BOX
Patient's leg appears less swollen and uncomfortable yesterday and today. Patient informed of our team meeting today regarding her case and the decision to transfer her to a tertiary facility like [facility] or [facility].  more for ancillay support in the event of a complication from   the thrombus during or immediate following delivery. NST today at L&D was active /reactive. Transfer commenced and spoke  to accepting physician   regarding her case. She does not feel she needed to be switched to heparin.

## 2018-02-13 NOTE — PROGRESS NOTE ADULT - SUBJECTIVE AND OBJECTIVE BOX
CC:  Patient is a 27y old  Female who presents with a chief complaint of left leg pain (07 Feb 2018 02:29)    2/9: left leg pain better  no thrombectomy    2/10: leg pain better  has compression stockings now    2/11: decreased pain in leg    2/12: leg pain same    2/13: condition same      PHYSICAL EXAM:    Daily     Daily     ICU Vital Signs Last 24 Hrs  T(C): 36.9 (13 Feb 2018 12:12), Max: 36.9 (13 Feb 2018 12:12)  T(F): 98.5 (13 Feb 2018 12:12), Max: 98.5 (13 Feb 2018 12:12)  HR: 92 (13 Feb 2018 12:12) (71 - 92)  BP: 116/77 (13 Feb 2018 12:12) (103/66 - 122/60)  BP(mean): --  ABP: --  ABP(mean): --  RR: 16 (13 Feb 2018 12:12) (16 - 16)  SpO2: 100% (13 Feb 2018 12:12) (99% - 100%)      Constitutional: Well appearing  HEENT: Atraumatic,                         10.8   8.9   )-----------( 267      ( 12 Feb 2018 07:26 )             33.2       CBC Full  -  ( 12 Feb 2018 07:26 )  WBC Count : 8.9 K/uL  Hemoglobin : 10.8 g/dL  Hematocrit : 33.2 %  Platelet Count - Automated : 267 K/uL  Mean Cell Volume : 76.1 fl  Mean Cell Hemoglobin : 24.8 pg  Mean Cell Hemoglobin Concentration : 32.6 gm/dL  Auto Neutrophil # : x  Auto Lymphocyte # : x  Auto Monocyte # : x  Auto Eosinophil # : x  Auto Basophil # : x  Auto Neutrophil % : x  Auto Lymphocyte % : x  Auto Monocyte % : x  Auto Eosinophil % : x  Auto Basophil % : x      02-12    136  |  105  |  7   ----------------------------<  83  3.9   |  23  |  0.48<L>    Ca    8.4<L>      12 Feb 2018 07:26                              MEDICATIONS  (STANDING):  docusate sodium 100 milliGRAM(s) Oral daily  enoxaparin Injectable 100 milliGRAM(s) SubCutaneous every 12 hours  sodium chloride 0.9% lock flush 3 milliLiter(s) IV Push every 8 hours

## 2018-02-13 NOTE — PROGRESS NOTE ADULT - SUBJECTIVE AND OBJECTIVE BOX
case reviewed earlier this morning at a multi-disciplinary/team meeting.  I expressed my concern that despite some improvement patient's leg remained significantly swollen/  I expressed my concern about a post phlebitic syndrome and/or underlying May Gonzalez syndrome;  If present simple anticoagulation may not be sufficient.  All agreed patient will be best served by transfer to a tertiary facility.    I also reiterated that after delivery patient should remain on anticoagulation; options include Lovenox, Coumadin, and if not breast feeding ;   Consider newer agents such as Xarelto.    6-10 weeks after delivery patient can follow-up with me in the outpatient setting and we can then address  full laboratory assessment to rule out underlying thrombophilia.  based on that I can then make specific recommendations about if there is any need for long-term anticoagulation.

## 2018-02-13 NOTE — PROGRESS NOTE ADULT - ASSESSMENT
27F.  admitted 18.   @ 34 weeks gestation.  c/o LLE swelling and pain.  onset of symptoms 2 days prior to admission.  LE venous doppler c/w above the knee LLE DVT.    LLE DVT likely pregnancy related (hypercoagulable from pregnancy).  -US noted. Pt currently denies any SOB. No tachycardia on exam.  -c/w Lovenox 1mg/kg q12h.  -Hematology + VSx consults appreciated  - possible  thrombectomy after delivery vs Lovenox 3 months post delivery  - steroids for fetal lung maturity  possible transfer to [facility] today    pregnancy 34 weeks  -management per OB  started on steroid.    Hyponatremia 134: rechecked , 138 on 2/10, normalized    DVT prophylaxis.  -LMWH.    poc discussed with pt,  team

## 2018-02-14 ENCOUNTER — ASOB RESULT (OUTPATIENT)
Age: 28
End: 2018-02-14

## 2018-02-14 ENCOUNTER — APPOINTMENT (OUTPATIENT)
Dept: ANTEPARTUM | Facility: CLINIC | Age: 28
End: 2018-02-14

## 2018-02-14 LAB
APPEARANCE UR: ABNORMAL
B2 GLYCOPROT1 AB SER QL: POSITIVE
BACTERIA # UR AUTO: ABNORMAL /HPF
BILIRUB UR-MCNC: NEGATIVE — SIGNIFICANT CHANGE UP
BLD GP AB SCN SERPL QL: NEGATIVE — SIGNIFICANT CHANGE UP
COLOR SPEC: YELLOW — SIGNIFICANT CHANGE UP
DIFF PNL FLD: NEGATIVE — SIGNIFICANT CHANGE UP
DRVVT SCREEN TO CONFIRM RATIO: ABNORMAL
EPI CELLS # UR: SIGNIFICANT CHANGE UP /HPF
FACT X ACT/NOR PPP: 176 % — HIGH (ref 70–170)
GLUCOSE UR QL: NEGATIVE — SIGNIFICANT CHANGE UP
KETONES UR-MCNC: NEGATIVE — SIGNIFICANT CHANGE UP
LA NT DPL PPP QL: 45.6 SEC — SIGNIFICANT CHANGE UP
LEUKOCYTE ESTERASE UR-ACNC: NEGATIVE — SIGNIFICANT CHANGE UP
NITRITE UR-MCNC: NEGATIVE — SIGNIFICANT CHANGE UP
NORMALIZED SCT PPP-RTO: 1.26 RATIO — HIGH (ref 0–1.16)
NORMALIZED SCT PPP-RTO: ABNORMAL
PH UR: 7 — SIGNIFICANT CHANGE UP (ref 5–8)
PROT UR-MCNC: NEGATIVE — SIGNIFICANT CHANGE UP
RBC CASTS # UR COMP ASSIST: SIGNIFICANT CHANGE UP /HPF (ref 0–2)
RH IG SCN BLD-IMP: POSITIVE — SIGNIFICANT CHANGE UP
SP GR SPEC: 1.02 — SIGNIFICANT CHANGE UP (ref 1.01–1.02)
UROBILINOGEN FLD QL: NEGATIVE — SIGNIFICANT CHANGE UP
WBC UR QL: SIGNIFICANT CHANGE UP /HPF (ref 0–5)

## 2018-02-14 PROCEDURE — 99233 SBSQ HOSP IP/OBS HIGH 50: CPT

## 2018-02-14 PROCEDURE — 76818 FETAL BIOPHYS PROFILE W/NST: CPT | Mod: 26

## 2018-02-14 PROCEDURE — 76816 OB US FOLLOW-UP PER FETUS: CPT | Mod: 26

## 2018-02-14 RX ORDER — ASPIRIN/CALCIUM CARB/MAGNESIUM 324 MG
81 TABLET ORAL DAILY
Qty: 0 | Refills: 0 | Status: DISCONTINUED | OUTPATIENT
Start: 2018-02-14 | End: 2018-02-17

## 2018-02-14 RX ADMIN — Medication 1 TABLET(S): at 12:02

## 2018-02-14 RX ADMIN — ENOXAPARIN SODIUM 100 MILLIGRAM(S): 100 INJECTION SUBCUTANEOUS at 16:06

## 2018-02-14 RX ADMIN — Medication 81 MILLIGRAM(S): at 16:07

## 2018-02-14 RX ADMIN — ENOXAPARIN SODIUM 100 MILLIGRAM(S): 100 INJECTION SUBCUTANEOUS at 04:30

## 2018-02-14 RX ADMIN — Medication 325 MILLIGRAM(S): at 12:02

## 2018-02-14 RX ADMIN — Medication 1 MILLIGRAM(S): at 12:02

## 2018-02-14 NOTE — PROGRESS NOTE ADULT - SUBJECTIVE AND OBJECTIVE BOX
HEYDI Attending Note    26yo  @ 35 3/7 weeks (d,13) [facility] after diagnosis of DVT in the left common femoral vein, prox greater saphenous, and femoral vein 1 week ago at 34 weeks gestation. She presented with acute onset left lower extremity pain and swelling, and was started on therapeutic Lovenox once the diagnosis was confirmed. Her pain and swelling have gradually improved over the week. She is now able to ambulate without difficulty. No chest pain, SOB, cough.  AntiXa on 18 was therapeutic at 0.93.     The patient was counseled at [facility] by the IR team and vascular surgeons along with MFM and OB. Decision between therapeutic anticoagulation and thrombolysis richa-partum was discussed. She received BMZ earlier this week and plan there was for delivery in order to decompress the gravid uterus and alleviate venous stasis.     Ms. Mcgarry denies medical or surgical history. She was recently told she has a ~ 6cm fibroid in her uterus. No family history of blood clots that she knows of.    Currently feeling well. Noted small amount of blood in the toilet when voiding at 5am. No blood on pad since that time. Again noted blood with wiping early this afternoon. No active bleeding.    Vital Signs Last 24 Hrs  T(C): 36.5 (2018 09:10), Max: 36.6 (2018 00:47)  T(F): 97.7 (2018 09:10), Max: 97.9 (2018 00:47)  HR: 71 (2018 09:10) (69 - 84)  BP: 115/73 (2018 09:10) (103/69 - 121/85)  RR: 16 (2018 09:10) (16 - 18)  SpO2: 100% (2018 09:10) (97% - 100%)  EFM reactive without decels    Sonogram: breech, efw 2238g (23%), AC > 3rd, MARYAM 8.4cm, BPP 8/8, MARYAM 9cm    LABS:                        12.1   10.0  )-----------( 314      ( 2018 18:21 )             36.0     02-13    136  |  100  |  8   ----------------------------<  164<H>  4.4   |  23  |  0.51    Ca    9.2      2018 18:21    TPro  7.3  /  Alb  2.9<L>  /  TBili  0.2  /  DBili  x   /  AST  34  /  ALT  31  /  AlkPhos  139<H>  02-13 HEYDI Attending Note    26yo  @ 35 3/7 weeks (d,13) transferred from [facility] after diagnosis of DVT in the left common femoral vein, prox greater saphenous, and femoral vein 1 week ago at 34 weeks gestation. She presented with acute onset left lower extremity pain and swelling, and was started on therapeutic Lovenox once the diagnosis was confirmed. Her pain and swelling have gradually improved over the week. She is now able to ambulate without difficulty. No chest pain, SOB, cough.  AntiXa on 18 was therapeutic at 0.93.     The patient was counseled at [place] by the IR team and vascular surgeons along with MFM and OB. Decision between therapeutic anticoagulation and thrombolysis richa-partum was discussed. She received BMZ earlier this week and plan there was for delivery in order to decompress the gravid uterus and alleviate venous stasis.     Ms. Mcgarry denies medical or surgical history. She was recently told she has a ~ 6cm fibroid in her uterus. No family history of blood clots.    Currently feeling well. Noted small amount of blood in the toilet when voiding at 5am. No blood on pad since that time. Again noted blood with wiping early this afternoon. No active bleeding.  + fetal movement, no contractions    Vital Signs Last 24 Hrs  T(C): 36.5 (2018 09:10), Max: 36.6 (2018 00:47)  T(F): 97.7 (2018 09:10), Max: 97.9 (2018 00:47)  HR: 71 (2018 09:10) (69 - 84)  BP: 115/73 (2018 09:10) (103/69 - 121/85)  RR: 16 (2018 09:10) (16 - 18)  SpO2: 100% (2018 09:10) (97% - 100%)  EFM reactive without decels    Sonogram: breech, efw 2238g (23%), AC > 3rd, MARYAM 8.4cm, BPP 8/8, MARYAM 9cm    LABS:                        12.1   10.0  )-----------( 314      ( 2018 18:21 )             36.0     02-13    136  |  100  |  8   ----------------------------<  164<H>  4.4   |  23  |  0.51    Ca    9.2      2018 18:21    TPro  7.3  /  Alb  2.9<L>  /  TBili  0.2  /  DBili  x   /  AST  34  /  ALT  31  /  AlkPhos  139<H>  02-13

## 2018-02-14 NOTE — PROGRESS NOTE ADULT - ASSESSMENT
26yo  @ 35 3/7 weeks (d,13) admitted with new left-sided DVT (common femoral, femoral, greater saphenous) diagnosed at 34 weeks, stable with fetal status reassuring.    [] incomplete    Appreciate vascular surgery consultation  Alert OB anesthesia and nursing; multidisciplinary meeting tomorrow  - continue Lovenox  - check anti-Xa peak (4 - 6 hours after dose, goal 0.6 - 1.1)  - initiate ASA 81mg daily  -  at 38 weeks (breech, coordinate anticoagulation)  - BPP 1 week to reassess amniotic fluid volume    Migdalia Pessel  [phone] 26yo  @ 35 3/7 weeks (d,13) admitted with new left-sided DVT (common femoral, femoral, greater saphenous) diagnosed at 34 weeks, stable with fetal status reassuring.    I counseled Ms. Mcgarry and her family regarding management of a new DVT in the third trimester in an otherwise hemodynamically stable patient. Goals of therapy are to minimize risk of recurrent thromboembolism and risk of clot propagation. This is typically accomplished with anticoagulation, and in pregnancy the preferred agent is Lovenox in twice daily injections. I reassured her regarding safety profile for the fetus.     In the setting of maternal and fetal stability and  gestational age, I do not see indication for delivery now. Ultimately delivery will relieve venous compression from the gravid uterus and will likely alleviate some of her symptoms, but continued expectant management until term is beneficial for the patient (allowing more time for clot dissolution and for Lovenox to take action) and the fetus (lung maturity, complications of late ).     We discussed the option of thrombolysis or thrombectomy, which is typically reserved for hemodynamically unstable patients or those who cannot be anticoagulated for some reason. Thrombolysis is associated with risk of bleeding and may adversely affect both the patient and fetus. In my brief discussion with the vascular surgery resident, they were in agreement with avoiding thrombolysis if possible, although we will await their full consultation. We also briefly discussed IVC filter, which is also not clearly indicated for Ms. Mcgarry since she has not failed anticoagulation and is able to tolerate Lovenox for several more weeks.    In terms of delivery planning, the fetus is breech and given the size and location of her fibroid will likely remain breech. Scheduled  is likely the preferred mode of delivery. I would recommend holding the PM dose of Lovenox prior to an AM  so that 24 hours elapse between her therapeutic Lovenox dose and time of surgery. The case can then be performed under regional anesthesia. Barring bleeding complications, Lovenox could then be resumed 6 - 12 hours postop and continued for at least 6 weeks postpartum (likely 3 months at least; will defer to hematology). The patient is aware that if she presented in labor prior to scheduled , the case would be performed under general anesthesia. In order to avoid this, scheduled  at 38 weeks seems reasonable to balance maternal and fetal risks.    Appreciate vascular surgery consultation  Alert OB anesthesia and nursing; multidisciplinary meeting tomorrow  - continue Lovenox 1mg/kg q12  - check anti-Xa peak (4 - 6 hours after dose, goal 0.6 - 1.1)  - initiate ASA 81mg daily  -  at 38 weeks as above (breech, coordinate anticoagulation)  - BPP 1 week to reassess amniotic fluid volume    Migdalia Pessel  [phone]

## 2018-02-14 NOTE — CONSULT NOTE ADULT - ATTENDING COMMENTS
Full consult H&P as above; discussed with residents. She is a 27 year old lady who is 35 weeks pregnant. She has a left common femoral vein DVT. She currently has mild pain and mild swelling in her thigh. The swelling in her leg has improved with the current measures including Lovenox and compression stockings. She is currently using RIKI stockings. She would do better with a thigh high compression stocking. An ace bandage can be applied now to her thigh to decrease the swelling. There is currently no indication for thrombolysis as she has few symptoms and there is nothing to suggest phlegmasia cerulea dolens. She has no symptoms of a pulmonary embolism. Continue Lovenox. She can walk but should elevate her leg when sitting.

## 2018-02-14 NOTE — CONSULT NOTE ADULT - ASSESSMENT
The patient is a 27-year-old 35-week pregnant female without past medical or surgical history who presents as a transfer from [facility] following diagnosis of a DVT in the left common femoral vein and prox greater saphenous vein 1 week prior. Patient was appropriately put on therapeutic lovenox for anticoagulation. The patient states that she has had significant resolution of her swelling and pain. The leg is noticeably swollen compared to the right lower extremity, however all four quadrants are soft and she is able to ambulate without assistance. No pain on passive movement of lower extremity. The patient has no signs of phlegmasia or post thrombotic syndrome. She denies chest pain, shortness of breath, palpitations, dizziness or syncope.       - continue therapeutic Lovenox  - continue T.E.D stockings  - no indication for thrombolysis at the moment  - [facility]  [phone]

## 2018-02-14 NOTE — CONSULT NOTE ADULT - SUBJECTIVE AND OBJECTIVE BOX
CC: DVT, pregnant  HPI: The patient is a 27-year-old 35-week pregnant female without past medical or surgical history who presents as a transfer from [facility] following diagnosis of a DVT in the left common femoral vein and prox greater saphenous vein 1 week prior. Patient was appropriately put on therapeutic lovenox for anticoagulation. The patient states that she has had significant resolution of her swelling and pain. The leg is noticeably swollen compared to the right lower extremity, however all four quadrants are soft and she is able to ambulate without assistance. No pain on passive movement of lower extremity. The patient has no signs of phlegmasia or post thrombotic syndrome. She denies chest pain, shortness of breath, palpitations, dizziness or syncope.       PAST MEDICAL & SURGICAL HISTORY:  - none    Review of Systems:   General: denies weight change, fever or fatigue  HEENT: denies sore throat, hoarseness  Respiratory: denies cough, shortness of breath at rest and on exertion, wheezing  Cardiovascular: denies chest pain, abnormal heart rhythm, PND, palpitations  Gastrointestinal: obvious gravid abdomen; denies nausea, vomiting, pain  Genitourinary: denies frequent urination, painful urination, kidney disease  Neurological: denies seizures, headaches  Muscoloskeletal: 1 week of left lower extremity swelling and pain, which has sig resolved  Psychiatric: denies depression, anxiety    MEDICATIONS  (STANDING):  aspirin  chewable 81 milliGRAM(s) Oral daily  enoxaparin Injectable 100 milliGRAM(s) SubCutaneous every 12 hours  ferrous    sulfate 325 milliGRAM(s) Oral daily  folic acid 1 milliGRAM(s) Oral daily  prenatal multivitamin 1 Tablet(s) Oral daily    MEDICATIONS  (PRN):  acetaminophen   Tablet. 975 milliGRAM(s) Oral every 6 hours PRN Moderate Pain (4 - 6)    Allergies  - No Known Allergies      SOCIAL HISTORY:  Occupation:  Smoking Hx: denies  Etoh Hx: denies  IVDA Hx: denies    FAMILY HISTORY:  - unless noted, no significant family hx with Mother, Father, Siblings      Objective:   Vital Signs Last 24 Hrs  T(C): 36.6 (14 Feb 2018 17:49), Max: 36.6 (14 Feb 2018 00:47)  T(F): 97.9 (14 Feb 2018 17:49), Max: 97.9 (14 Feb 2018 00:47)  HR: 85 (14 Feb 2018 17:49) (69 - 85)  BP: 110/77 (14 Feb 2018 17:49) (103/69 - 121/85)  BP(mean): --  RR: 18 (14 Feb 2018 17:49) (16 - 18)  SpO2: 98% (14 Feb 2018 17:49) (97% - 100%)    Physical Exam:  General: Well developed, well nourished, alert and cooperative, and appears to be in no acute distress.  HEENT: normocephalic, vision is grossly intact  Neck: Neck supple, non-tender without lymphadenopathy, masses or thyromegaly  Chest: lungs clear bilaterally, non-labored breathing, no wheezing or rhonci  Cardiac: Regular rhythm, rate of , +S1 & S2, no murmurs, rubs or gallops  Abdomen: Obviously gravid; soft,    Extremities: swollen left lower extremity, tenderness to palpation, which patient states has significantly improved over the last week; minimally indurated on the later thigh; no signs of phlegmasia or post-thrombotic syndrome  Peripheral pulses intact. No varicosities.  Musculoskeletal: Adequately aligned spine. ROM intact spine and extremities. No joint erythema or tenderness. Normal muscular development.    Neurological: no focal deficits, strength and sensation symmetric and intact throughout. Reflexes 2+ throughout.        LABS:                        12.1   10.0  )-----------( 314      ( 13 Feb 2018 18:21 )             36.0     02-13    136  |  100  |  8   ----------------------------<  164<H>  4.4   |  23  |  0.51    Ca    9.2      13 Feb 2018 18:21    TPro  7.3  /  Alb  2.9<L>  /  TBili  0.2  /  DBili  x   /  AST  34  /  ALT  31  /  AlkPhos  139<H>  02-13

## 2018-02-15 DIAGNOSIS — M79.605 PAIN IN LEFT LEG: ICD-10-CM

## 2018-02-15 DIAGNOSIS — I82.422 ACUTE EMBOLISM AND THROMBOSIS OF LEFT ILIAC VEIN: ICD-10-CM

## 2018-02-15 DIAGNOSIS — O99.89 OTHER SPECIFIED DISEASES AND CONDITIONS COMPLICATING PREGNANCY, CHILDBIRTH AND THE PUERPERIUM: ICD-10-CM

## 2018-02-15 DIAGNOSIS — O99.283 ENDOCRINE, NUTRITIONAL AND METABOLIC DISEASES COMPLICATING PREGNANCY, THIRD TRIMESTER: ICD-10-CM

## 2018-02-15 DIAGNOSIS — Z3A.34 34 WEEKS GESTATION OF PREGNANCY: ICD-10-CM

## 2018-02-15 DIAGNOSIS — E87.1 HYPO-OSMOLALITY AND HYPONATREMIA: ICD-10-CM

## 2018-02-15 DIAGNOSIS — O22.33 DEEP PHLEBOTHROMBOSIS IN PREGNANCY, THIRD TRIMESTER: ICD-10-CM

## 2018-02-15 DIAGNOSIS — I82.412 ACUTE EMBOLISM AND THROMBOSIS OF LEFT FEMORAL VEIN: ICD-10-CM

## 2018-02-15 LAB
B2 GLYCOPROT1 IGA SER QL: 11 SAU — SIGNIFICANT CHANGE UP
B2 GLYCOPROT1 IGG SER-ACNC: 19.8 SGU — SIGNIFICANT CHANGE UP
B2 GLYCOPROT1 IGM SER-ACNC: 7.7 SMU — SIGNIFICANT CHANGE UP
CARDIOLIPIN AB SER-ACNC: POSITIVE
CARDIOLIPIN IGM SER-MCNC: 56.6 GPL — HIGH (ref 0–12.5)
CARDIOLIPIN IGM SER-MCNC: 9.5 MPL — SIGNIFICANT CHANGE UP (ref 0–12.5)
CULTURE RESULTS: SIGNIFICANT CHANGE UP
GROUP B BETA STREP DNA (PCR): SIGNIFICANT CHANGE UP
GROUP B BETA STREP INTERPRETATION: SIGNIFICANT CHANGE UP
LMWH PPP CHRO-ACNC: 0.47 IU/ML — LOW (ref 0.5–1.1)
SOURCE GROUP B STREP: SIGNIFICANT CHANGE UP
SPECIMEN SOURCE: SIGNIFICANT CHANGE UP

## 2018-02-15 PROCEDURE — 59025 FETAL NON-STRESS TEST: CPT | Mod: 26

## 2018-02-15 PROCEDURE — 99232 SBSQ HOSP IP/OBS MODERATE 35: CPT

## 2018-02-15 RX ORDER — ENOXAPARIN SODIUM 100 MG/ML
110 INJECTION SUBCUTANEOUS EVERY 12 HOURS
Qty: 0 | Refills: 0 | Status: DISCONTINUED | OUTPATIENT
Start: 2018-02-15 | End: 2018-02-17

## 2018-02-15 RX ADMIN — Medication 81 MILLIGRAM(S): at 13:17

## 2018-02-15 RX ADMIN — ENOXAPARIN SODIUM 100 MILLIGRAM(S): 100 INJECTION SUBCUTANEOUS at 04:35

## 2018-02-15 RX ADMIN — Medication 1 TABLET(S): at 13:17

## 2018-02-15 RX ADMIN — ENOXAPARIN SODIUM 110 MILLIGRAM(S): 100 INJECTION SUBCUTANEOUS at 18:01

## 2018-02-15 RX ADMIN — Medication 1 MILLIGRAM(S): at 13:17

## 2018-02-15 RX ADMIN — Medication 325 MILLIGRAM(S): at 13:17

## 2018-02-15 NOTE — PROGRESS NOTE ADULT - SUBJECTIVE AND OBJECTIVE BOX
brief summary    Case discussed at multidisciplinary rounds.  Plan for primary  (breech) at 38 weeks (coordinate therapeutic anticoagulation). Scheduled Monday 3/5/18.  Plan for admission dany 3/4/18 to initiate Heparin gtt. Pt may take her AM Lovenox dose dany 3/4/18 prior to admission.   Discontinue gtt 4 hours prior to  on 3/5/18.  Postop restart Lovenox bridge ~ 6 - 12 hours postop unless bleeding complications.  Consult heme at that time to initiate Xarelto vs Coumadin.     Pt scheduled for HRC appointment + BPP on friday, 18.  Migdalia Garcia

## 2018-02-15 NOTE — PROGRESS NOTE ADULT - ASSESSMENT
28yo  @ 35 4/7 weeks (d,13) admitted with new left-sided DVT (common femoral, femoral, greater saphenous) diagnosed at 34 weeks, stable with fetal status reassuring.    See yesterday's note for full counseling details    Antiphospholipid antibodies (APL) are all positive. LAC can be confounded by Lovenox. Labs will need to be repeated in ~ 12 weeks to confirm diagnosis of APLS. In the meantime, therapeutic Lovenox will be continued for DVT regardless. Aspirin was initiated during pregnancy to reduce adverse OB risks. Estrogen containing contraception is not recommended.    Appreciate vascular surgery consultation  Alert OB anesthesia and nursing; multidisciplinary meeting today  - continue Lovenox 1mg/kg q12  - check anti-Xa peak (4 - 6 hours after dose, goal 0.6 - 1.1)  - ASA 81mg daily initiated yesterday due to triple positive APL antibodies  -  at 38 weeks (breech, coordinate anticoagulation); scheduled with Dr. Simental Monday 3/5/18. Plan to hold Lovenox the evening before, and then resume Lovenox 6 - 12 hours postop. At that point will discuss with hematology regarding transition to Xarelto or Coumadin  - BPP 1 week to reassess amniotic fluid volume    Migdalia Garcia  [phone]

## 2018-02-15 NOTE — PROGRESS NOTE ADULT - SUBJECTIVE AND OBJECTIVE BOX
MFM Attending Note    Feeling well, no acute complaints  No events overnight    Vital Signs Last 24 Hrs  T(C): 36.3 (15 Feb 2018 04:43), Max: 36.9 (2018 21:14)  T(F): 97.3 (15 Feb 2018 04:43), Max: 98.4 (2018 21:14)  HR: 60 (15 Feb 2018 04:43) (60 - 85)  BP: 113/73 (15 Feb 2018 04:43) (110/77 - 113/73)  RR: 18 (15 Feb 2018 04:43) (18 - 18)  SpO2: 98% (15 Feb 2018 04:43) (98% - 98%)  EFM reactive without decels    Urinalysis Basic - ( 2018 21:02 )    Color: Yellow / Appearance: SL Turbid / S.017 / pH: x  Gluc: x / Ketone: Negative  / Bili: Negative / Urobili: Negative   Blood: x / Protein: Negative / Nitrite: Negative   Leuk Esterase: Negative / RBC: 0-2 /HPF / WBC 0-2 /HPF   Sq Epi: x / Non Sq Epi: OCC /HPF / Bacteria: Few /HPF    LAC, B2 glycoprotein, LAC positive

## 2018-02-16 LAB — DEPRECATED CARDIOLIPIN IGA SER: <5 APL — SIGNIFICANT CHANGE UP (ref 0–12.5)

## 2018-02-16 PROCEDURE — 99232 SBSQ HOSP IP/OBS MODERATE 35: CPT

## 2018-02-16 RX ADMIN — Medication 1 TABLET(S): at 12:35

## 2018-02-16 RX ADMIN — Medication 325 MILLIGRAM(S): at 12:35

## 2018-02-16 RX ADMIN — Medication 1 MILLIGRAM(S): at 12:35

## 2018-02-16 RX ADMIN — Medication 81 MILLIGRAM(S): at 12:35

## 2018-02-16 RX ADMIN — ENOXAPARIN SODIUM 110 MILLIGRAM(S): 100 INJECTION SUBCUTANEOUS at 06:18

## 2018-02-16 RX ADMIN — ENOXAPARIN SODIUM 110 MILLIGRAM(S): 100 INJECTION SUBCUTANEOUS at 18:13

## 2018-02-16 NOTE — PROGRESS NOTE ADULT - SUBJECTIVE AND OBJECTIVE BOX
CC: DVT, pregnant  HPI: The patient is a 27-year-old 35-week pregnant female without past medical or surgical history who presents as a transfer from [facility] following diagnosis of a DVT in the left common femoral vein and prox greater saphenous vein 1 week prior. Patient was appropriately put on therapeutic lovenox for anticoagulation. The patient states that she has had significant resolution of her swelling and pain. The leg is noticeably swollen compared to the right lower extremity, however all four quadrants are soft and she is able to ambulate without assistance. No pain on passive movement of lower extremity. The patient has no signs of phlegmasia or post thrombotic syndrome. She denies chest pain, shortness of breath, palpitations, dizziness or syncope.   24 Hour/Overnight Events:           Objective:  Vital Signs Last 24 Hrs  T(C): 36.6 (2018 12:10), Max: 36.7 (15 Feb 2018 17:35)  T(F): 97.9 (2018 12:10), Max: 98.1 (15 Feb 2018 17:35)  HR: 81 (2018 12:10) (73 - 91)  BP: 115/73 (2018 12:10) (91/58 - 115/73)  BP(mean): --  RR: 18 (2018 12:10) (18 - 18)  SpO2: 98% (2018 12:10) (97% - 98%)    Physical Exam:  General:  Respiratory:  Cardiovascular:  Abdomen:     MEDICATIONS  (STANDING):  aspirin  chewable 81 milliGRAM(s) Oral daily  enoxaparin Injectable 110 milliGRAM(s) SubCutaneous every 12 hours  ferrous    sulfate 325 milliGRAM(s) Oral daily  folic acid 1 milliGRAM(s) Oral daily  prenatal multivitamin 1 Tablet(s) Oral daily    MEDICATIONS  (PRN):  acetaminophen   Tablet. 975 milliGRAM(s) Oral every 6 hours PRN Moderate Pain (4 - 6)    I&O's Detail    Daily     Daily     LABS:            Urinalysis Basic - ( 2018 21:02 )    Color: Yellow / Appearance: SL Turbid / S.017 / pH: x  Gluc: x / Ketone: Negative  / Bili: Negative / Urobili: Negative   Blood: x / Protein: Negative / Nitrite: Negative   Leuk Esterase: Negative / RBC: 0-2 /HPF / WBC 0-2 /HPF   Sq Epi: x / Non Sq Epi: OCC /HPF / Bacteria: Few /HPF        RADIOLOGY & ADDITIONAL STUDIES: CC: DVT, pregnant  HPI: The patient is a 27-year-old 35-week pregnant female without past medical or surgical history who presents as a transfer from [facility] following diagnosis of a DVT in the left common femoral vein and prox greater saphenous vein 1 week prior. Patient was appropriately put on therapeutic lovenox for anticoagulation. The patient states that she has had significant resolution of her swelling and pain. The leg is noticeably swollen compared to the right lower extremity, however all four quadrants are soft and she is able to ambulate without assistance. No pain on passive movement of lower extremity. The patient has no signs of phlegmasia or post thrombotic syndrome. She denies chest pain, shortness of breath, palpitations, dizziness or syncope.   24 Hour/Overnight Events:  Patient seen and examined at bedside, doing well. Still no signs of phlegmasia or post thrombotic syndrome. Pain and tenderness resolving. Agree with therapeutic Lovenox and ASA. Would        Objective:  Vital Signs Last 24 Hrs  T(C): 36.6 (2018 12:10), Max: 36.7 (15 Feb 2018 17:35)  T(F): 97.9 (2018 12:10), Max: 98.1 (15 Feb 2018 17:35)  HR: 81 (2018 12:10) (73 - 91)  BP: 115/73 (2018 12:10) (91/58 - 115/73)  BP(mean): --  RR: 18 (2018 12:10) (18 - 18)  SpO2: 98% (2018 12:10) (97% - 98%)    Physical Exam:  General: Well developed, well nourished, alert and cooperative, and appears to be in no acute distress.  HEENT: normocephalic, vision is grossly intact  Neck: Neck supple, non-tender without lymphadenopathy, masses or thyromegaly  Chest: lungs clear bilaterally, non-labored breathing, no wheezing or rhonci  Cardiac: Regular rhythm, rate of , +S1 & S2, no murmurs, rubs or gallops  Abdomen: Obviously gravid; soft,    Extremities: swollen left lower extremity, tenderness to palpation, which patient states has significantly improved over the last week; minimally indurated on the later thigh; no signs of phlegmasia or post-thrombotic syndrome  Peripheral pulses intact. No varicosities.  Musculoskeletal: Adequately aligned spine. ROM intact spine and extremities. No joint erythema or tenderness. Normal muscular development.    Neurological: no focal deficits, strength and sensation symmetric and intact throughout. Reflexes 2+ throughout.     MEDICATIONS  (STANDING):  aspirin  chewable 81 milliGRAM(s) Oral daily  enoxaparin Injectable 110 milliGRAM(s) SubCutaneous every 12 hours  ferrous    sulfate 325 milliGRAM(s) Oral daily  folic acid 1 milliGRAM(s) Oral daily  prenatal multivitamin 1 Tablet(s) Oral daily    MEDICATIONS  (PRN):  acetaminophen   Tablet. 975 milliGRAM(s) Oral every 6 hours PRN Moderate Pain (4 - 6)    I&O's Detail    Daily     Daily     LABS:            Urinalysis Basic - ( 2018 21:02 )    Color: Yellow / Appearance: SL Turbid / S.017 / pH: x  Gluc: x / Ketone: Negative  / Bili: Negative / Urobili: Negative   Blood: x / Protein: Negative / Nitrite: Negative   Leuk Esterase: Negative / RBC: 0-2 /HPF / WBC 0-2 /HPF   Sq Epi: x / Non Sq Epi: OCC /HPF / Bacteria: Few /HPF CC: DVT, pregnant  HPI: The patient is a 27-year-old 35-week pregnant female without past medical or surgical history who presents as a transfer from [facility] following diagnosis of a DVT in the left common femoral vein and prox greater saphenous vein 1 week prior. Patient was appropriately put on therapeutic lovenox for anticoagulation. The patient states that she has had significant resolution of her swelling and pain. The leg is noticeably swollen compared to the right lower extremity, however all four quadrants are soft and she is able to ambulate without assistance. No pain on passive movement of lower extremity. The patient has no signs of phlegmasia or post thrombotic syndrome. She denies chest pain, shortness of breath, palpitations, dizziness or syncope.   24 Hour/Overnight Events:  Patient seen and examined at bedside, doing well. Still no signs of phlegmasia or post thrombotic syndrome. Pain and tenderness resolving. Agree with therapeutic Lovenox and ASA. Would        Objective:  Vital Signs Last 24 Hrs  T(C): 36.6 (2018 12:10), Max: 36.7 (15 Feb 2018 17:35)  T(F): 97.9 (2018 12:10), Max: 98.1 (15 Feb 2018 17:35)  HR: 81 (2018 12:10) (73 - 91)  BP: 115/73 (2018 12:10) (91/58 - 115/73)  BP(mean): --  RR: 18 (2018 12:10) (18 - 18)  SpO2: 98% (2018 12:10) (97% - 98%)    Physical Exam:  General: Well developed, well nourished, alert and cooperative, and appears to be in no acute distress.  HEENT: normocephalic, vision is grossly intact  Neck: Neck supple, non-tender without lymphadenopathy, masses or thyromegaly  Chest: lungs clear bilaterally, non-labored breathing, no wheezing or rhonci  Cardiac: Regular rhythm, rate of , +S1 & S2, no murmurs, rubs or gallops  Abdomen: Obviously gravid; soft,    Extremities: swollen LLE, tenderness to palpation, which patient states has significantly improved over the last week; minimally indurated on the later thigh; no signs of phlegmasia or post-thrombotic syndrome  Peripheral pulses intact. No varicosities.  Musculoskeletal: Adequately aligned spine. ROM intact spine and extremities. No joint erythema or tenderness. Normal muscular development.    Neurological: no focal deficits, strength and sensation symmetric and intact throughout. Reflexes 2+ throughout.     MEDICATIONS  (STANDING):  aspirin  chewable 81 milliGRAM(s) Oral daily  enoxaparin Injectable 110 milliGRAM(s) SubCutaneous every 12 hours  ferrous    sulfate 325 milliGRAM(s) Oral daily  folic acid 1 milliGRAM(s) Oral daily  prenatal multivitamin 1 Tablet(s) Oral daily    MEDICATIONS  (PRN):  acetaminophen   Tablet. 975 milliGRAM(s) Oral every 6 hours PRN Moderate Pain (4 - 6)    I&O's Detail    Daily     Daily     LABS:            Urinalysis Basic - ( 2018 21:02 )    Color: Yellow / Appearance: SL Turbid / S.017 / pH: x  Gluc: x / Ketone: Negative  / Bili: Negative / Urobili: Negative   Blood: x / Protein: Negative / Nitrite: Negative   Leuk Esterase: Negative / RBC: 0-2 /HPF / WBC 0-2 /HPF   Sq Epi: x / Non Sq Epi: OCC /HPF / Bacteria: Few /HPF

## 2018-02-16 NOTE — PROGRESS NOTE ADULT - ASSESSMENT
The patient is a 27-year-old 35-week pregnant female without past medical or surgical history who presents as a transfer from [facility] following diagnosis of a DVT in the left common femoral vein and prox greater saphenous vein 1 week prior. Patient was appropriately put on therapeutic lovenox for anticoagulation. The patient states that she has had significant resolution of her swelling and pain. The leg is noticeably swollen compared to the right lower extremity, however all four quadrants are soft and she is able to ambulate without assistance. No pain on passive movement of lower extremity. The patient has no signs of phlegmasia or post thrombotic syndrome. She denies chest pain, shortness of breath, palpitations, dizziness or syncope.       - continue therapeutic Lovenox and ASA  - recommend thigh-high compression stockings or ACE bandage rather than RIKI stockings    - no indication for thrombolysis at the moment  - no acute vascular intervention indication       Emmanuel Majo PGY2  [phone]                       Full consult H&P as above; discussed with residents. She is a 27 year old lady who is 35 weeks pregnant. She has a left common femoral vein DVT. She currently has mild pain and mild swelling in her thigh. The swelling in her leg has improved with the current measures including Lovenox and compression stockings. She is currently using RIKI stockings. She would do better with a thigh high compression stocking. An ace bandage can be applied now to her thigh to decrease the swelling. There is currently no indication for thrombolysis as she has few symptoms and there is nothing to suggest phlegmasia cerulea dolens. She has no symptoms of a pulmonary embolism. Continue Lovenox. She can walk but should elevate her leg when sitting.

## 2018-02-17 ENCOUNTER — TRANSCRIPTION ENCOUNTER (OUTPATIENT)
Age: 28
End: 2018-02-17

## 2018-02-17 VITALS
RESPIRATION RATE: 18 BRPM | DIASTOLIC BLOOD PRESSURE: 70 MMHG | TEMPERATURE: 98 F | SYSTOLIC BLOOD PRESSURE: 112 MMHG | HEART RATE: 87 BPM

## 2018-02-17 LAB — LMWH PPP CHRO-ACNC: 0.86 IU/ML — SIGNIFICANT CHANGE UP (ref 0.5–1.1)

## 2018-02-17 PROCEDURE — 85520 HEPARIN ASSAY: CPT

## 2018-02-17 PROCEDURE — 86900 BLOOD TYPING SEROLOGIC ABO: CPT

## 2018-02-17 PROCEDURE — 87653 STREP B DNA AMP PROBE: CPT

## 2018-02-17 PROCEDURE — 86850 RBC ANTIBODY SCREEN: CPT

## 2018-02-17 PROCEDURE — 86901 BLOOD TYPING SEROLOGIC RH(D): CPT

## 2018-02-17 PROCEDURE — 87086 URINE CULTURE/COLONY COUNT: CPT

## 2018-02-17 PROCEDURE — 81001 URINALYSIS AUTO W/SCOPE: CPT

## 2018-02-17 PROCEDURE — 99231 SBSQ HOSP IP/OBS SF/LOW 25: CPT

## 2018-02-17 PROCEDURE — 85260 CLOT FACTOR X STUART-POWER: CPT

## 2018-02-17 PROCEDURE — 85027 COMPLETE CBC AUTOMATED: CPT

## 2018-02-17 PROCEDURE — 86147 CARDIOLIPIN ANTIBODY EA IG: CPT

## 2018-02-17 PROCEDURE — 86146 BETA-2 GLYCOPROTEIN ANTIBODY: CPT

## 2018-02-17 PROCEDURE — 80053 COMPREHEN METABOLIC PANEL: CPT

## 2018-02-17 RX ORDER — ACETAMINOPHEN 500 MG
3 TABLET ORAL
Qty: 0 | Refills: 0 | DISCHARGE
Start: 2018-02-17

## 2018-02-17 RX ORDER — ASPIRIN/CALCIUM CARB/MAGNESIUM 324 MG
1 TABLET ORAL
Qty: 60 | Refills: 0 | OUTPATIENT
Start: 2018-02-17 | End: 2018-04-17

## 2018-02-17 RX ORDER — ENOXAPARIN SODIUM 100 MG/ML
110 INJECTION SUBCUTANEOUS
Qty: 110 | Refills: 0 | OUTPATIENT
Start: 2018-02-17 | End: 2018-03-18

## 2018-02-17 RX ADMIN — Medication 325 MILLIGRAM(S): at 12:17

## 2018-02-17 RX ADMIN — Medication 81 MILLIGRAM(S): at 12:17

## 2018-02-17 RX ADMIN — Medication 1 TABLET(S): at 12:17

## 2018-02-17 RX ADMIN — ENOXAPARIN SODIUM 110 MILLIGRAM(S): 100 INJECTION SUBCUTANEOUS at 06:09

## 2018-02-17 RX ADMIN — Medication 1 MILLIGRAM(S): at 12:17

## 2018-02-17 NOTE — DISCHARGE NOTE ANTEPARTUM - ADDITIONAL INSTRUCTIONS
[facility] follow [facility] in [facility] 23rd at 10:30 am   Please report to [facility] at [facility] 3/4 at 8am for admission and heparin drip prior to C/S on 3/5  Please call [phone] with any questions or concerns

## 2018-02-17 NOTE — DISCHARGE NOTE ANTEPARTUM - CARE PLAN
Principal Discharge DX:	DVT (deep vein thrombosis) in pregnancy  Goal:	Anticoagulation  Assessment and plan of treatment:	Lovenox 110mg BID

## 2018-02-17 NOTE — DISCHARGE NOTE ANTEPARTUM - MEDICATION SUMMARY - MEDICATIONS TO TAKE
I will START or STAY ON the medications listed below when I get home from the hospital:    aspirin 81 mg oral tablet, chewable  -- 1 tab(s) by mouth once a day  -- Indication: For Antiphospholpid Syndrome    acetaminophen 325 mg oral tablet  -- 3 tab(s) by mouth every 6 hours, As needed, Moderate Pain (4 - 6)  -- Indication: For Pain    Lovenox 100 mg/mL injectable solution  -- 110 milligram(s) subcutaneously every 12 hours , 6a and 6p  -- It is very important that you take or use this exactly as directed.  Do not skip doses or discontinue unless directed by your doctor.    -- Indication: For DVT    Prenatal Multivitamins with Folic Acid 1 mg oral tablet  -- 1 tab(s) by mouth once a day  -- Indication: For multivitamin

## 2018-02-17 NOTE — DISCHARGE NOTE ANTEPARTUM - PROVIDER TOKENS
FREE:[LAST:[HRC],PHONE:[phone]],FAX:[(   )    -],ADDRESS:[[address] [place] [address]]]

## 2018-02-17 NOTE — DISCHARGE NOTE ANTEPARTUM - PATIENT PORTAL LINK FT
You can access the [facility] Patient Portal, offered by [facility], by registering with the following website: [email or web address]

## 2018-02-17 NOTE — DISCHARGE NOTE ANTEPARTUM - HOSPITAL COURSE
Patient transferred from [facility] with newly diagnosed Left lower extremity DVT started on Lovenox 100 BID. Factor Xa level checked and subtherapeutic. Patient su increased to 110 BID. Repeat Factor Xa level check and noted to be therapeutic. Patient evaluated by vascular surgery, no further intervention necessary. All  testing reassuring. Patient d/c home on ASA and Lovenox with close follow up in C and planned C/S for breech. Patient transferred from [facility] with newly diagnosed Left lower extremity DVT started on Lovenox 100 BID. Factor Xa level checked and subtherapeutic. Patient dose increased to 110 BID. Repeat Factor Xa level check and noted to be therapeutic. Patient evaluated by vascular surgery, no further intervention necessary. All  testing reassuring. Patient d/c home on ASA and Lovenox with close follow up in C and planned C/S for breech.

## 2018-02-17 NOTE — DISCHARGE NOTE ANTEPARTUM - CARE PROVIDER_API CALL
[facility],   [address] [place] [address]  [address] [place], [place]  Phone: [phone]  Fax: (   )    -

## 2018-02-20 ENCOUNTER — OTHER (OUTPATIENT)
Age: 28
End: 2018-02-20

## 2018-02-22 ENCOUNTER — APPOINTMENT (OUTPATIENT)
Dept: ANTEPARTUM | Facility: CLINIC | Age: 28
End: 2018-02-22

## 2018-02-23 ENCOUNTER — LABORATORY RESULT (OUTPATIENT)
Age: 28
End: 2018-02-23

## 2018-02-23 ENCOUNTER — NON-APPOINTMENT (OUTPATIENT)
Age: 28
End: 2018-02-23

## 2018-02-23 ENCOUNTER — ASOB RESULT (OUTPATIENT)
Age: 28
End: 2018-02-23

## 2018-02-23 ENCOUNTER — APPOINTMENT (OUTPATIENT)
Dept: ANTEPARTUM | Facility: CLINIC | Age: 28
End: 2018-02-23
Payer: MEDICAID

## 2018-02-23 ENCOUNTER — APPOINTMENT (OUTPATIENT)
Dept: MATERNAL FETAL MEDICINE | Facility: CLINIC | Age: 28
End: 2018-02-23
Payer: MEDICAID

## 2018-02-23 ENCOUNTER — OUTPATIENT (OUTPATIENT)
Dept: OUTPATIENT SERVICES | Facility: HOSPITAL | Age: 28
LOS: 1 days | End: 2018-02-23
Payer: MEDICAID

## 2018-02-23 VITALS
WEIGHT: 228 LBS | DIASTOLIC BLOOD PRESSURE: 84 MMHG | SYSTOLIC BLOOD PRESSURE: 126 MMHG | BODY MASS INDEX: 30.88 KG/M2 | HEIGHT: 72 IN

## 2018-02-23 DIAGNOSIS — Z3A.36 36 WEEKS GESTATION OF PREGNANCY: ICD-10-CM

## 2018-02-23 LAB
BILIRUB UR QL STRIP: NEGATIVE
COLLECTION METHOD: NORMAL
GLUCOSE UR-MCNC: NEGATIVE
HCG UR QL: 0.2 EU/DL
HGB UR QL STRIP.AUTO: NORMAL
KETONES UR-MCNC: NEGATIVE
LEUKOCYTE ESTERASE UR QL STRIP: NORMAL
NITRITE UR QL STRIP: NEGATIVE
PH UR STRIP: 6
PROT UR STRIP-MCNC: NEGATIVE
SP GR UR STRIP: 1.02

## 2018-02-23 PROCEDURE — 84443 ASSAY THYROID STIM HORMONE: CPT

## 2018-02-23 PROCEDURE — 83655 ASSAY OF LEAD: CPT

## 2018-02-23 PROCEDURE — 76818 FETAL BIOPHYS PROFILE W/NST: CPT | Mod: 26

## 2018-02-23 PROCEDURE — 86780 TREPONEMA PALLIDUM: CPT

## 2018-02-23 PROCEDURE — 87389 HIV-1 AG W/HIV-1&-2 AB AG IA: CPT

## 2018-02-23 PROCEDURE — G0463: CPT

## 2018-02-23 PROCEDURE — 83020 HEMOGLOBIN ELECTROPHORESIS: CPT

## 2018-02-23 PROCEDURE — 76818 FETAL BIOPHYS PROFILE W/NST: CPT

## 2018-02-23 PROCEDURE — 87086 URINE CULTURE/COLONY COUNT: CPT

## 2018-02-23 PROCEDURE — 85027 COMPLETE CBC AUTOMATED: CPT

## 2018-02-23 PROCEDURE — 81003 URINALYSIS AUTO W/O SCOPE: CPT

## 2018-02-23 PROCEDURE — 86480 TB TEST CELL IMMUN MEASURE: CPT

## 2018-02-23 PROCEDURE — 99213 OFFICE O/P EST LOW 20 MIN: CPT | Mod: GC

## 2018-02-23 PROCEDURE — 83020 HEMOGLOBIN ELECTROPHORESIS: CPT | Mod: 26

## 2018-02-23 PROCEDURE — 81003 URINALYSIS AUTO W/O SCOPE: CPT | Mod: QW,NC

## 2018-02-23 PROCEDURE — 86787 VARICELLA-ZOSTER ANTIBODY: CPT

## 2018-02-27 ENCOUNTER — LABORATORY RESULT (OUTPATIENT)
Age: 28
End: 2018-02-27

## 2018-02-27 DIAGNOSIS — O09.899 SUPERVISION OF OTHER HIGH RISK PREGNANCIES, UNSPECIFIED TRIMESTER: ICD-10-CM

## 2018-02-27 LAB
HCT VFR BLD CALC: 42.2 % — SIGNIFICANT CHANGE UP (ref 34.5–45)
HGB BLD-MCNC: 12.3 G/DL — SIGNIFICANT CHANGE UP (ref 11.5–15.5)
HIV 1+2 AB+HIV1 P24 AG SERPL QL IA: SIGNIFICANT CHANGE UP
MCHC RBC-ENTMCNC: 24.6 PG — LOW (ref 27–34)
MCHC RBC-ENTMCNC: 29.1 GM/DL — LOW (ref 32–36)
MCV RBC AUTO: 84.2 FL — SIGNIFICANT CHANGE UP (ref 80–100)
PLATELET # BLD AUTO: 429 K/UL — HIGH (ref 150–400)
RBC # BLD: 5.01 M/UL — SIGNIFICANT CHANGE UP (ref 3.8–5.2)
RBC # FLD: 17 % — HIGH (ref 10.3–14.5)
T PALLIDUM AB TITR SER: NEGATIVE — SIGNIFICANT CHANGE UP
TSH SERPL-MCNC: 4.46 UIU/ML — HIGH (ref 0.27–4.2)
VZV IGG FLD QL IA: 399 INDEX — SIGNIFICANT CHANGE UP
VZV IGG FLD QL IA: POSITIVE — SIGNIFICANT CHANGE UP
WBC # BLD: 10.48 K/UL — SIGNIFICANT CHANGE UP (ref 3.8–10.5)
WBC # FLD AUTO: 10.48 K/UL — SIGNIFICANT CHANGE UP (ref 3.8–10.5)

## 2018-02-28 LAB
CULTURE RESULTS: SIGNIFICANT CHANGE UP
LEAD BLD-MCNC: <1 UG/DL — SIGNIFICANT CHANGE UP (ref 0–19)
M TB TUBERC IFN-G BLD QL: 0.03 IU/ML — SIGNIFICANT CHANGE UP
M TB TUBERC IFN-G BLD QL: 0.04 IU/ML — SIGNIFICANT CHANGE UP
M TB TUBERC IFN-G BLD QL: NEGATIVE — SIGNIFICANT CHANGE UP
MITOGEN IGNF BCKGRD COR BLD-ACNC: 4.17 IU/ML — SIGNIFICANT CHANGE UP
SPECIMEN SOURCE: SIGNIFICANT CHANGE UP

## 2018-03-01 LAB
HEMOGLOBIN INTERPRETATION: SIGNIFICANT CHANGE UP
HGB A MFR BLD: 97.9 % — SIGNIFICANT CHANGE UP (ref 95.8–98)
HGB A2 MFR BLD: 2.1 % — SIGNIFICANT CHANGE UP (ref 2–3.2)

## 2018-03-02 ENCOUNTER — NON-APPOINTMENT (OUTPATIENT)
Age: 28
End: 2018-03-02

## 2018-03-02 ENCOUNTER — ASOB RESULT (OUTPATIENT)
Age: 28
End: 2018-03-02

## 2018-03-02 ENCOUNTER — APPOINTMENT (OUTPATIENT)
Dept: ANTEPARTUM | Facility: CLINIC | Age: 28
End: 2018-03-02
Payer: MEDICAID

## 2018-03-02 ENCOUNTER — OUTPATIENT (OUTPATIENT)
Dept: OUTPATIENT SERVICES | Facility: HOSPITAL | Age: 28
LOS: 1 days | End: 2018-03-02
Payer: MEDICAID

## 2018-03-02 ENCOUNTER — APPOINTMENT (OUTPATIENT)
Dept: MATERNAL FETAL MEDICINE | Facility: CLINIC | Age: 28
End: 2018-03-02
Payer: MEDICAID

## 2018-03-02 VITALS
SYSTOLIC BLOOD PRESSURE: 130 MMHG | BODY MASS INDEX: 31.15 KG/M2 | DIASTOLIC BLOOD PRESSURE: 82 MMHG | HEIGHT: 72 IN | WEIGHT: 230 LBS

## 2018-03-02 DIAGNOSIS — O09.899 SUPERVISION OF OTHER HIGH RISK PREGNANCIES, UNSPECIFIED TRIMESTER: ICD-10-CM

## 2018-03-02 PROCEDURE — 76818 FETAL BIOPHYS PROFILE W/NST: CPT | Mod: 26

## 2018-03-02 PROCEDURE — 81003 URINALYSIS AUTO W/O SCOPE: CPT | Mod: QW,GC

## 2018-03-02 PROCEDURE — 99213 OFFICE O/P EST LOW 20 MIN: CPT | Mod: 25,GC

## 2018-03-02 PROCEDURE — 76818 FETAL BIOPHYS PROFILE W/NST: CPT

## 2018-03-04 ENCOUNTER — INPATIENT (INPATIENT)
Facility: HOSPITAL | Age: 28
LOS: 3 days | Discharge: HOME | End: 2018-03-08
Attending: OBSTETRICS & GYNECOLOGY | Admitting: OBSTETRICS & GYNECOLOGY
Payer: MEDICAID

## 2018-03-04 VITALS — WEIGHT: 229.28 LBS | HEIGHT: 72 IN

## 2018-03-04 DIAGNOSIS — O32.9XX0 MATERNAL CARE FOR MALPRESENTATION OF FETUS, UNSPECIFIED, NOT APPLICABLE OR UNSPECIFIED: ICD-10-CM

## 2018-03-04 DIAGNOSIS — Z34.00 ENCOUNTER FOR SUPERVISION OF NORMAL FIRST PREGNANCY, UNSPECIFIED TRIMESTER: ICD-10-CM

## 2018-03-04 LAB
APTT BLD: 39.4 SEC — HIGH (ref 27.5–37.4)
APTT BLD: > 200 SEC (ref 27.5–37.4)
BASOPHILS # BLD AUTO: 0 K/UL — SIGNIFICANT CHANGE UP (ref 0–0.2)
BASOPHILS NFR BLD AUTO: 0.2 % — SIGNIFICANT CHANGE UP (ref 0–2)
EOSINOPHIL # BLD AUTO: 0 K/UL — SIGNIFICANT CHANGE UP (ref 0–0.5)
EOSINOPHIL NFR BLD AUTO: 0.4 % — SIGNIFICANT CHANGE UP (ref 0–6)
HBV SURFACE AG SERPL QL IA: SIGNIFICANT CHANGE UP
HCT VFR BLD CALC: 34.8 % — SIGNIFICANT CHANGE UP (ref 34.5–45)
HCT VFR BLD CALC: 35.7 % — SIGNIFICANT CHANGE UP (ref 34.5–45)
HGB BLD-MCNC: 11.8 G/DL — SIGNIFICANT CHANGE UP (ref 11.5–15.5)
HGB BLD-MCNC: 11.9 G/DL — SIGNIFICANT CHANGE UP (ref 11.5–15.5)
INR BLD: 0.92 RATIO — SIGNIFICANT CHANGE UP (ref 0.88–1.16)
LYMPHOCYTES # BLD AUTO: 1.9 K/UL — SIGNIFICANT CHANGE UP (ref 1–3.3)
LYMPHOCYTES # BLD AUTO: 18.6 % — SIGNIFICANT CHANGE UP (ref 13–44)
MCHC RBC-ENTMCNC: 25.8 PG — LOW (ref 27–34)
MCHC RBC-ENTMCNC: 26.3 PG — LOW (ref 27–34)
MCHC RBC-ENTMCNC: 33.3 GM/DL — SIGNIFICANT CHANGE UP (ref 32–36)
MCHC RBC-ENTMCNC: 34.1 GM/DL — SIGNIFICANT CHANGE UP (ref 32–36)
MCV RBC AUTO: 77.2 FL — LOW (ref 80–100)
MCV RBC AUTO: 77.7 FL — LOW (ref 80–100)
MONOCYTES # BLD AUTO: 0.6 K/UL — SIGNIFICANT CHANGE UP (ref 0–0.9)
MONOCYTES NFR BLD AUTO: 6.3 % — SIGNIFICANT CHANGE UP (ref 2–14)
NEUTROPHILS # BLD AUTO: 7.6 K/UL — HIGH (ref 1.8–7.4)
NEUTROPHILS NFR BLD AUTO: 74.4 % — SIGNIFICANT CHANGE UP (ref 43–77)
PLATELET # BLD AUTO: 264 K/UL — SIGNIFICANT CHANGE UP (ref 150–400)
PLATELET # BLD AUTO: 271 K/UL — SIGNIFICANT CHANGE UP (ref 150–400)
PROTHROM AB SERPL-ACNC: 9.9 SEC — SIGNIFICANT CHANGE UP (ref 9.8–12.7)
RBC # BLD: 4.5 M/UL — SIGNIFICANT CHANGE UP (ref 3.8–5.2)
RBC # BLD: 4.6 M/UL — SIGNIFICANT CHANGE UP (ref 3.8–5.2)
RBC # FLD: 14.7 % — HIGH (ref 10.3–14.5)
RBC # FLD: 14.9 % — HIGH (ref 10.3–14.5)
WBC # BLD: 10.1 K/UL — SIGNIFICANT CHANGE UP (ref 3.8–10.5)
WBC # BLD: 8.5 K/UL — SIGNIFICANT CHANGE UP (ref 3.8–10.5)
WBC # FLD AUTO: 10.1 K/UL — SIGNIFICANT CHANGE UP (ref 3.8–10.5)
WBC # FLD AUTO: 8.5 K/UL — SIGNIFICANT CHANGE UP (ref 3.8–10.5)

## 2018-03-04 PROCEDURE — 59514 CESAREAN DELIVERY ONLY: CPT | Mod: U9

## 2018-03-04 RX ORDER — HEPARIN SODIUM 5000 [USP'U]/ML
8500 INJECTION INTRAVENOUS; SUBCUTANEOUS EVERY 6 HOURS
Qty: 0 | Refills: 0 | Status: DISCONTINUED | OUTPATIENT
Start: 2018-03-04 | End: 2018-03-05

## 2018-03-04 RX ORDER — HEPARIN SODIUM 5000 [USP'U]/ML
8500 INJECTION INTRAVENOUS; SUBCUTANEOUS ONCE
Qty: 0 | Refills: 0 | Status: COMPLETED | OUTPATIENT
Start: 2018-03-04 | End: 2018-03-04

## 2018-03-04 RX ORDER — HEPARIN SODIUM 5000 [USP'U]/ML
4000 INJECTION INTRAVENOUS; SUBCUTANEOUS EVERY 6 HOURS
Qty: 0 | Refills: 0 | Status: DISCONTINUED | OUTPATIENT
Start: 2018-03-04 | End: 2018-03-05

## 2018-03-04 RX ORDER — HEPARIN SODIUM 5000 [USP'U]/ML
INJECTION INTRAVENOUS; SUBCUTANEOUS
Qty: 25000 | Refills: 0 | Status: DISCONTINUED | OUTPATIENT
Start: 2018-03-04 | End: 2018-03-05

## 2018-03-04 RX ADMIN — HEPARIN SODIUM 1800 UNIT(S)/HR: 5000 INJECTION INTRAVENOUS; SUBCUTANEOUS at 11:12

## 2018-03-04 RX ADMIN — HEPARIN SODIUM 8500 UNIT(S): 5000 INJECTION INTRAVENOUS; SUBCUTANEOUS at 10:45

## 2018-03-04 RX ADMIN — HEPARIN SODIUM 0 UNIT(S)/HR: 5000 INJECTION INTRAVENOUS; SUBCUTANEOUS at 18:08

## 2018-03-04 RX ADMIN — HEPARIN SODIUM 1500 UNIT(S)/HR: 5000 INJECTION INTRAVENOUS; SUBCUTANEOUS at 19:03

## 2018-03-05 ENCOUNTER — RESULT REVIEW (OUTPATIENT)
Age: 28
End: 2018-03-05

## 2018-03-05 ENCOUNTER — APPOINTMENT (OUTPATIENT)
Dept: OBGYN | Facility: CLINIC | Age: 28
End: 2018-03-05
Payer: MEDICAID

## 2018-03-05 DIAGNOSIS — O22.30 DEEP PHLEBOTHROMBOSIS IN PREGNANCY, UNSPECIFIED TRIMESTER: ICD-10-CM

## 2018-03-05 LAB
ANION GAP SERPL CALC-SCNC: 10 MMOL/L — SIGNIFICANT CHANGE UP (ref 5–17)
APTT BLD: 189.2 SEC — CRITICAL HIGH (ref 27.5–37.4)
APTT BLD: 36.8 SEC — SIGNIFICANT CHANGE UP (ref 27.5–37.4)
APTT BLD: 38.2 SEC — HIGH (ref 27.5–37.4)
APTT BLD: 39 SEC — HIGH (ref 27.5–37.4)
BUN SERPL-MCNC: 8 MG/DL — SIGNIFICANT CHANGE UP (ref 7–23)
CALCIUM SERPL-MCNC: 9.3 MG/DL — SIGNIFICANT CHANGE UP (ref 8.4–10.5)
CHLORIDE SERPL-SCNC: 101 MMOL/L — SIGNIFICANT CHANGE UP (ref 96–108)
CO2 SERPL-SCNC: 25 MMOL/L — SIGNIFICANT CHANGE UP (ref 22–31)
CREAT SERPL-MCNC: 0.66 MG/DL — SIGNIFICANT CHANGE UP (ref 0.5–1.3)
GLUCOSE SERPL-MCNC: 79 MG/DL — SIGNIFICANT CHANGE UP (ref 70–99)
HCT VFR BLD CALC: 38 % — SIGNIFICANT CHANGE UP (ref 34.5–45)
HGB BLD-MCNC: 12.9 G/DL — SIGNIFICANT CHANGE UP (ref 11.5–15.5)
INR BLD: 0.9 RATIO — SIGNIFICANT CHANGE UP (ref 0.88–1.16)
INR BLD: 0.91 RATIO — SIGNIFICANT CHANGE UP (ref 0.88–1.16)
INR BLD: 0.92 RATIO — SIGNIFICANT CHANGE UP (ref 0.88–1.16)
MCHC RBC-ENTMCNC: 26.4 PG — LOW (ref 27–34)
MCHC RBC-ENTMCNC: 34 GM/DL — SIGNIFICANT CHANGE UP (ref 32–36)
MCV RBC AUTO: 77.7 FL — LOW (ref 80–100)
PLATELET # BLD AUTO: 268 K/UL — SIGNIFICANT CHANGE UP (ref 150–400)
POTASSIUM SERPL-MCNC: 4.4 MMOL/L — SIGNIFICANT CHANGE UP (ref 3.5–5.3)
POTASSIUM SERPL-SCNC: 4.4 MMOL/L — SIGNIFICANT CHANGE UP (ref 3.5–5.3)
PROTHROM AB SERPL-ACNC: 9.7 SEC — LOW (ref 9.8–12.7)
PROTHROM AB SERPL-ACNC: 9.8 SEC — SIGNIFICANT CHANGE UP (ref 9.8–12.7)
PROTHROM AB SERPL-ACNC: 9.9 SEC — SIGNIFICANT CHANGE UP (ref 9.8–12.7)
RBC # BLD: 4.88 M/UL — SIGNIFICANT CHANGE UP (ref 3.8–5.2)
RBC # FLD: 14.8 % — HIGH (ref 10.3–14.5)
RUBV IGG SER-ACNC: 12.4 INDEX — SIGNIFICANT CHANGE UP
RUBV IGG SER-IMP: POSITIVE — SIGNIFICANT CHANGE UP
SODIUM SERPL-SCNC: 136 MMOL/L — SIGNIFICANT CHANGE UP (ref 135–145)
T PALLIDUM AB TITR SER: NEGATIVE — SIGNIFICANT CHANGE UP
WBC # BLD: 9.6 K/UL — SIGNIFICANT CHANGE UP (ref 3.8–10.5)
WBC # FLD AUTO: 9.6 K/UL — SIGNIFICANT CHANGE UP (ref 3.8–10.5)

## 2018-03-05 PROCEDURE — 59514 CESAREAN DELIVERY ONLY: CPT | Mod: U9

## 2018-03-05 PROCEDURE — XXXXX: CPT

## 2018-03-05 RX ORDER — DIPHENHYDRAMINE HCL 50 MG
25 CAPSULE ORAL EVERY 6 HOURS
Qty: 0 | Refills: 0 | Status: DISCONTINUED | OUTPATIENT
Start: 2018-03-05 | End: 2018-03-08

## 2018-03-05 RX ORDER — SODIUM CHLORIDE 9 MG/ML
1000 INJECTION, SOLUTION INTRAVENOUS
Qty: 0 | Refills: 0 | Status: DISCONTINUED | OUTPATIENT
Start: 2018-03-05 | End: 2018-03-07

## 2018-03-05 RX ORDER — ACETAMINOPHEN 500 MG
1000 TABLET ORAL ONCE
Qty: 0 | Refills: 0 | Status: COMPLETED | OUTPATIENT
Start: 2018-03-05 | End: 2018-03-05

## 2018-03-05 RX ORDER — DIPHENHYDRAMINE HCL 50 MG
25 CAPSULE ORAL EVERY 4 HOURS
Qty: 0 | Refills: 0 | Status: DISCONTINUED | OUTPATIENT
Start: 2018-03-05 | End: 2018-03-07

## 2018-03-05 RX ORDER — CITRIC ACID/SODIUM CITRATE 300-500 MG
30 SOLUTION, ORAL ORAL ONCE
Qty: 0 | Refills: 0 | Status: COMPLETED | OUTPATIENT
Start: 2018-03-05 | End: 2018-03-05

## 2018-03-05 RX ORDER — SIMETHICONE 80 MG/1
80 TABLET, CHEWABLE ORAL EVERY 4 HOURS
Qty: 0 | Refills: 0 | Status: DISCONTINUED | OUTPATIENT
Start: 2018-03-05 | End: 2018-03-08

## 2018-03-05 RX ORDER — HEPARIN SODIUM 5000 [USP'U]/ML
8500 INJECTION INTRAVENOUS; SUBCUTANEOUS ONCE
Qty: 0 | Refills: 0 | Status: DISCONTINUED | OUTPATIENT
Start: 2018-03-05 | End: 2018-03-06

## 2018-03-05 RX ORDER — HEPARIN SODIUM 5000 [USP'U]/ML
INJECTION INTRAVENOUS; SUBCUTANEOUS
Qty: 25000 | Refills: 0 | Status: DISCONTINUED | OUTPATIENT
Start: 2018-03-05 | End: 2018-03-06

## 2018-03-05 RX ORDER — KETOROLAC TROMETHAMINE 30 MG/ML
30 SYRINGE (ML) INJECTION EVERY 6 HOURS
Qty: 0 | Refills: 0 | Status: DISCONTINUED | OUTPATIENT
Start: 2018-03-05 | End: 2018-03-07

## 2018-03-05 RX ORDER — HYDROMORPHONE HYDROCHLORIDE 2 MG/ML
0.5 INJECTION INTRAMUSCULAR; INTRAVENOUS; SUBCUTANEOUS
Qty: 0 | Refills: 0 | Status: DISCONTINUED | OUTPATIENT
Start: 2018-03-05 | End: 2018-03-05

## 2018-03-05 RX ORDER — HYDROMORPHONE HYDROCHLORIDE 2 MG/ML
0.5 INJECTION INTRAMUSCULAR; INTRAVENOUS; SUBCUTANEOUS
Qty: 0 | Refills: 0 | Status: DISCONTINUED | OUTPATIENT
Start: 2018-03-05 | End: 2018-03-07

## 2018-03-05 RX ORDER — METOCLOPRAMIDE HCL 10 MG
10 TABLET ORAL ONCE
Qty: 0 | Refills: 0 | Status: COMPLETED | OUTPATIENT
Start: 2018-03-05 | End: 2018-03-05

## 2018-03-05 RX ORDER — SODIUM CHLORIDE 9 MG/ML
1000 INJECTION, SOLUTION INTRAVENOUS ONCE
Qty: 0 | Refills: 0 | Status: COMPLETED | OUTPATIENT
Start: 2018-03-05 | End: 2018-03-05

## 2018-03-05 RX ORDER — IBUPROFEN 200 MG
600 TABLET ORAL EVERY 6 HOURS
Qty: 0 | Refills: 0 | Status: COMPLETED | OUTPATIENT
Start: 2018-03-05 | End: 2019-02-01

## 2018-03-05 RX ORDER — ACETAMINOPHEN 500 MG
975 TABLET ORAL EVERY 6 HOURS
Qty: 0 | Refills: 0 | Status: DISCONTINUED | OUTPATIENT
Start: 2018-03-05 | End: 2018-03-08

## 2018-03-05 RX ORDER — GLYCERIN ADULT
1 SUPPOSITORY, RECTAL RECTAL AT BEDTIME
Qty: 0 | Refills: 0 | Status: DISCONTINUED | OUTPATIENT
Start: 2018-03-05 | End: 2018-03-08

## 2018-03-05 RX ORDER — FERROUS SULFATE 325(65) MG
325 TABLET ORAL DAILY
Qty: 0 | Refills: 0 | Status: DISCONTINUED | OUTPATIENT
Start: 2018-03-05 | End: 2018-03-08

## 2018-03-05 RX ORDER — HYDROMORPHONE HYDROCHLORIDE 2 MG/ML
30 INJECTION INTRAMUSCULAR; INTRAVENOUS; SUBCUTANEOUS
Qty: 0 | Refills: 0 | Status: DISCONTINUED | OUTPATIENT
Start: 2018-03-05 | End: 2018-03-07

## 2018-03-05 RX ORDER — NALOXONE HYDROCHLORIDE 4 MG/.1ML
0.1 SPRAY NASAL
Qty: 0 | Refills: 0 | Status: DISCONTINUED | OUTPATIENT
Start: 2018-03-05 | End: 2018-03-07

## 2018-03-05 RX ORDER — ONDANSETRON 8 MG/1
4 TABLET, FILM COATED ORAL EVERY 6 HOURS
Qty: 0 | Refills: 0 | Status: DISCONTINUED | OUTPATIENT
Start: 2018-03-05 | End: 2018-03-07

## 2018-03-05 RX ORDER — OXYCODONE HYDROCHLORIDE 5 MG/1
5 TABLET ORAL
Qty: 0 | Refills: 0 | Status: COMPLETED | OUTPATIENT
Start: 2018-03-05 | End: 2018-03-12

## 2018-03-05 RX ORDER — OXYTOCIN 10 UNIT/ML
41.67 VIAL (ML) INJECTION
Qty: 20 | Refills: 0 | Status: DISCONTINUED | OUTPATIENT
Start: 2018-03-05 | End: 2018-03-05

## 2018-03-05 RX ORDER — OXYCODONE HYDROCHLORIDE 5 MG/1
5 TABLET ORAL EVERY 4 HOURS
Qty: 0 | Refills: 0 | Status: COMPLETED | OUTPATIENT
Start: 2018-03-05 | End: 2018-03-12

## 2018-03-05 RX ORDER — HEPARIN SODIUM 5000 [USP'U]/ML
8500 INJECTION INTRAVENOUS; SUBCUTANEOUS EVERY 6 HOURS
Qty: 0 | Refills: 0 | Status: DISCONTINUED | OUTPATIENT
Start: 2018-03-05 | End: 2018-03-06

## 2018-03-05 RX ORDER — OXYTOCIN 10 UNIT/ML
333.33 VIAL (ML) INJECTION
Qty: 20 | Refills: 0 | Status: DISCONTINUED | OUTPATIENT
Start: 2018-03-05 | End: 2018-03-06

## 2018-03-05 RX ORDER — FAMOTIDINE 10 MG/ML
20 INJECTION INTRAVENOUS ONCE
Qty: 0 | Refills: 0 | Status: COMPLETED | OUTPATIENT
Start: 2018-03-05 | End: 2018-03-05

## 2018-03-05 RX ORDER — SODIUM CHLORIDE 9 MG/ML
1000 INJECTION, SOLUTION INTRAVENOUS
Qty: 0 | Refills: 0 | Status: DISCONTINUED | OUTPATIENT
Start: 2018-03-05 | End: 2018-03-06

## 2018-03-05 RX ORDER — DOCUSATE SODIUM 100 MG
100 CAPSULE ORAL
Qty: 0 | Refills: 0 | Status: DISCONTINUED | OUTPATIENT
Start: 2018-03-05 | End: 2018-03-08

## 2018-03-05 RX ORDER — TETANUS TOXOID, REDUCED DIPHTHERIA TOXOID AND ACELLULAR PERTUSSIS VACCINE, ADSORBED 5; 2.5; 8; 8; 2.5 [IU]/.5ML; [IU]/.5ML; UG/.5ML; UG/.5ML; UG/.5ML
0.5 SUSPENSION INTRAMUSCULAR ONCE
Qty: 0 | Refills: 0 | Status: DISCONTINUED | OUTPATIENT
Start: 2018-03-05 | End: 2018-03-08

## 2018-03-05 RX ORDER — HEPARIN SODIUM 5000 [USP'U]/ML
4000 INJECTION INTRAVENOUS; SUBCUTANEOUS EVERY 6 HOURS
Qty: 0 | Refills: 0 | Status: DISCONTINUED | OUTPATIENT
Start: 2018-03-05 | End: 2018-03-06

## 2018-03-05 RX ORDER — LANOLIN
1 OINTMENT (GRAM) TOPICAL
Qty: 0 | Refills: 0 | Status: DISCONTINUED | OUTPATIENT
Start: 2018-03-05 | End: 2018-03-08

## 2018-03-05 RX ORDER — OXYTOCIN 10 UNIT/ML
41.67 VIAL (ML) INJECTION
Qty: 20 | Refills: 0 | Status: DISCONTINUED | OUTPATIENT
Start: 2018-03-05 | End: 2018-03-06

## 2018-03-05 RX ORDER — SODIUM CHLORIDE 9 MG/ML
1000 INJECTION, SOLUTION INTRAVENOUS
Qty: 0 | Refills: 0 | Status: DISCONTINUED | OUTPATIENT
Start: 2018-03-05 | End: 2018-03-05

## 2018-03-05 RX ADMIN — Medication 30 MILLILITER(S): at 14:21

## 2018-03-05 RX ADMIN — HYDROMORPHONE HYDROCHLORIDE 0.5 MILLIGRAM(S): 2 INJECTION INTRAMUSCULAR; INTRAVENOUS; SUBCUTANEOUS at 20:43

## 2018-03-05 RX ADMIN — Medication 400 MILLIGRAM(S): at 20:47

## 2018-03-05 RX ADMIN — HYDROMORPHONE HYDROCHLORIDE 30 MILLILITER(S): 2 INJECTION INTRAMUSCULAR; INTRAVENOUS; SUBCUTANEOUS at 21:46

## 2018-03-05 RX ADMIN — HEPARIN SODIUM 1200 UNIT(S)/HR: 5000 INJECTION INTRAVENOUS; SUBCUTANEOUS at 02:50

## 2018-03-05 RX ADMIN — HYDROMORPHONE HYDROCHLORIDE 30 MILLILITER(S): 2 INJECTION INTRAMUSCULAR; INTRAVENOUS; SUBCUTANEOUS at 19:12

## 2018-03-05 RX ADMIN — FAMOTIDINE 20 MILLIGRAM(S): 10 INJECTION INTRAVENOUS at 14:20

## 2018-03-05 RX ADMIN — ONDANSETRON 4 MILLIGRAM(S): 8 TABLET, FILM COATED ORAL at 22:47

## 2018-03-05 RX ADMIN — SODIUM CHLORIDE 2000 MILLILITER(S): 9 INJECTION, SOLUTION INTRAVENOUS at 13:45

## 2018-03-05 RX ADMIN — Medication 10 MILLIGRAM(S): at 21:25

## 2018-03-05 RX ADMIN — HEPARIN SODIUM 0 UNIT(S)/HR: 5000 INJECTION INTRAVENOUS; SUBCUTANEOUS at 01:47

## 2018-03-05 NOTE — PROVIDER CONTACT NOTE (MEDICATION) - ACTION/TREATMENT ORDERED:
Maryann Mcpherson, pt to be weighed when transferred to [facility]. PTT to be drawn at 2330. Heparin gtt to be started at 12:00am.

## 2018-03-05 NOTE — PROVIDER CONTACT NOTE (MEDICATION) - BACKGROUND
Patient has a history of DVT 2/2018. Pt was on Lovenox daily which was d/c'd at 3/3. Patient was on Heparin since 3/4. Pt has a history of Antiphospholipid.

## 2018-03-05 NOTE — CONSULT NOTE ADULT - ATTENDING COMMENTS
Patient with recent DVT at 34 weeks who is undergoing  today, was on heparin gtt prior to surgery and is currently in OR.  Prior blood work did show elevated APL labs however these labs have to be repeated in 12 weeks to observe persistence of antibodies to make a definitive diagnosis of APLS.  Regardless, she will require anticoagulation post partum x 6 weeks and likely longer.  Following , would recommend starting heparin gtt x 24 hours to ensure no bleeding (can resume 6-12 hours following  as long as no excessive bleeding) and then transition to lovenox 1 mg/kg BID.  Would not start on coumadin/DOAC especially if she is breastfeeding (however lack of data with DOACs).

## 2018-03-05 NOTE — PROVIDER CONTACT NOTE (MEDICATION) - SITUATION
Patient has a pending heparin order to be started with Heparin bolus. Most recent ptt was drawn at 14:52. Called MD to [facility]. Pt last weight charted was when she was pregnant.

## 2018-03-05 NOTE — CONSULT NOTE ADULT - ASSESSMENT
28yo  @ 38 weeks gestation with new left-sided DVT (common femoral, femoral, greater saphenous) diagnosed at 34 weeks, admitted for C section for breech positioning, consider APL vs. May Thurner. 28yo  @ 38 weeks gestation with new left-sided DVT (common femoral, femoral, greater saphenous) diagnosed at 34 weeks, admitted for C section for breech positioning, consider APL +/- May Thurner syndrome.

## 2018-03-05 NOTE — CONSULT NOTE ADULT - PROBLEM SELECTOR RECOMMENDATION 9
-hold hep gtt for now until PTT within range for C section today  -plan to continue with therapeutic lovenox BID starting 6 hours post section  -In terms of long term a/c, given plans to breastfeed would recommend either continue with lovenox or transition to coumadin for 6 weeks postpartum. Only one case report for xarelto demonstrating low levels in breastmilk however infant in this report did not receive milk. Due to lack of data would avoid NOAC. Please arrange for patient to follow up either at [facility] or with Dr. Stephens in [place] to determine need for further a/c. She will also need outpatient U/S vs. CT to evaluate for L. iliac vein compression consistent with May Thurner. -hold hep gtt for now until PTT within range for C section today  -plan to continue with therapeutic lovenox BID starting 6 hours post section  -In terms of long term a/c, given plans to breastfeed would recommend either continue with lovenox or transition to coumadin for 6 weeks postpartum. Only one case report for xarelto demonstrating low levels in breastmilk however infant in this report did not receive milk. Due to lack of data would avoid NOAC. Please arrange for patient to follow up either at [facility] or with Dr. Stephens in [place] to determine need for further a/c, however we recommend lifelong a/c given positive antiphospholipid antibody serologies (anticardiolipin, B2G). She will also need outpatient U/S vs. CT to evaluate for L. iliac vein compression consistent with May Thurner. -hold hep gtt for now until PTT within range for C section today then restart heparin gtt after, keep on for 24 hours. If no bleeding complications please transition to therapeutic lovenox BID.  -In terms of long term a/c, given plans to breastfeed would recommend either continue with lovenox or transition to coumadin for 6 weeks postpartum. Only one case report for xarelto demonstrating low levels in breastmilk however infant in this report did not receive milk. Due to lack of data would avoid NOAC. Please arrange for patient to follow up either at [facility] or with Dr. Stephens in [place] to determine need for further a/c.  Given positive antiphospholipid antibody serologies (anticardiolipin, B2G) she will need these levels repeated as an outpatient, and need for lifelong a/c will then be decided. She should also have outpatient U/S vs. CT to evaluate for L. iliac vein compression consistent with May Thurner.

## 2018-03-05 NOTE — CONSULT NOTE ADULT - SUBJECTIVE AND OBJECTIVE BOX
28yo  @ 35 4/7 weeks (d,13) admitted with new left-sided DVT (common femoral, femoral, greater saphenous) diagnosed at 34 weeks. She initially presented with acute left lower extremity pain and swelling. Imaging subsequently revealed DVT in L. common femoral vein, proximal greater saphenous, and femoral vein. She was started on lovenox injections twice daily. Patient had APL testing-- all reportedly positive.     At this time of initial symptoms patient was evaluated at [facility] by Dr. Stephens, hematologist who  was concerned for underlying May Thurner syndrome, and recommended continuing a/c following delivery (lovenox vs. coumadin vs. NOAC, depending on nursing status). She was tranferred to [facility] at approx 36 weeks, where she was evaluated by [facility] who determined that there is no indication for urgent delivery. She was hemodynamically stable and thus thrombectomy vs. thrombolysis was not pursued.    She was admitted today or planned C section given breech positioning of baby. PM and AM dose of lovenox has been held and hep gtt started in anticipation of 1:30 pm C section.    Plan to resume lovenox 6-12 hours post op, to be continued for 6 wks to 3 months postpartum. ~~~~~INCOMPLETE NOTE~~~~~~~    Subjective:    26yo  @ 38 weeks gestation with new left-sided DVT (common femoral, femoral, greater saphenous) diagnosed at 34 weeks, admitted for C section for breech positioning. She initially presented with acute left lower extremity pain and swelling. Imaging subsequently revealed DVT in L. common femoral vein, proximal greater saphenous, and femoral vein. She was started on lovenox injections twice daily. Patient had APL testing-- all reportedly positive.     At the time of initial symptoms patient was evaluated at [facility] by Dr. Stephens, hematologist who  was concerned for underlying May Thurner syndrome, and recommended continuing a/c following delivery (lovenox vs. coumadin vs. NOAC, depending on nursing status). She was tranferred to [facility] at approx 36 weeks, where she was evaluated by [facility] who determined that there is no indication for urgent delivery. She was hemodynamically stable and thus thrombectomy vs. thrombolysis was not pursued.    She was admitted today or planned C section given breech positioning of baby. PM and AM dose of lovenox has been held and hep gtt started in anticipation of 1:30 pm C section.    Plan to resume lovenox 6-12 hours post op, to be continued for 6 wks to 3 months postpartum.    MEDICATIONS  (STANDING):  prenatal multivitamin 1 Tablet(s) Oral daily    Vitals  T(C): 36.5 (18 @ 05:29), Max: 36.5 (18 @ 21:21)  HR: 71 (18 @ 05:29) (63 - 81)  BP: 112/77 (18 @ 05:29) (108/75 - 122/81)  RR: 18 (18 @ 05:29) (18 - 18)  SpO2: 99% (18 @ 05:29) (96% - 99%)    PHYSICAL EXAM  GENERAL: NAD, well-developed  NEURO: AO x3, PERRLA, EOMI, motor strength in tact in 4/4 extremities, sensation in tact  HEAD:  Atraumatic, Normocephalic  EYES: conjunctiva and sclera clear  NECK: Supple, No JVD, no lymphadenopathy, no thyromegaly  CHEST/LUNG: Clear to auscultation bilaterally; No wheezes, rales or rhonchi  HEART: Regular rate and rhythm; No murmurs, rubs, or gallops  ABDOMEN: Soft, Nontender, Nondistended; Bowel sounds present, no masses.  EXTREMITIES:  2+ Peripheral Pulses, No clubbing, cyanosis, or edema  SKIN: Warm, dry, in tact, no rashes or lesions  PSYCH: affect appropriate    LABS:                        11.9   8.5   )-----------( 264      ( 04 Mar 2018 17:14 )             35.7   PT/INR - ( 04 Mar 2018 10:17 )   PT: 9.9 sec;   INR: 0.92 ratio         PTT - ( 05 Mar 2018 01:08 )  PTT:189.2 sec    I&O's Summary    Jeferson Devries MD  Internal Medicine PGY-1  Pager: [phone]/ LARRY [phone]  After 7 PM on weekdays and 12 PM on weekends page [phone] ~~~~~INCOMPLETE NOTE~~~~~~~    Subjective:    26yo  @ 38 weeks gestation with new left-sided DVT (common femoral, femoral, greater saphenous) diagnosed at 34 weeks, admitted for C section for breech positioning. She initially presented with acute left lower extremity pain and swelling. Imaging subsequently revealed DVT in L. common femoral vein, proximal greater saphenous, and femoral vein. She was started on lovenox injections twice daily. Patient had APL testing-- all reportedly positive.     At the time of initial symptoms patient was evaluated at [facility] by Dr. Stephens, hematologist who  was concerned for underlying May Thurner syndrome, and recommended continuing a/c following delivery (lovenox vs. coumadin vs. NOAC, depending on nursing status). She was tranferred to [facility] at approx 36 weeks, where she was evaluated by [facility] who determined that there is no indication for urgent delivery. She was hemodynamically stable and thus thrombectomy vs. thrombolysis was not pursued.    She was admitted today or planned C section given breech positioning of baby. PM and AM dose of lovenox has been held and hep gtt started in anticipation of 1:30 pm C section with current plan to resume lovenox 6-12 hours post op, to be continued for 6 wks to 3 months postpartum.    Currently patient feels well, denies CP/SOB. Endorses mild pain in left upper thigh upon movement. Concerned that she has not had U/S to monitor clot progression in >1 months. Plans on breast feeding. Denies family or personal history of thrombosis. No medications, allergies, PSHx, PMHx. No other complications with this pregnancy. She has not had ultrasound or other imaging to evaluate for May Thurner. Says that last dose of lovenox was Saturday 3/3 PM and has been on heparin gtt since admission on Pelon 3/4.    MEDICATIONS  (STANDING):  prenatal multivitamin 1 Tablet(s) Oral daily    Vitals  T(C): 36.5 (18 @ 05:29), Max: 36.5 (18 @ 21:21)  HR: 71 (18 @ 05:29) (63 - 81)  BP: 112/77 (18 @ 05:29) (108/75 - 122/81)  RR: 18 (18 @ 05:29) (18 - 18)  SpO2: 99% (18 @ 05:29) (96% - 99%)    PHYSICAL EXAM  GENERAL: NAD, well-developed pregnant female  NEURO: AO x3, PERRLA, EOMI, motor strength in tact in 4/4 extremities, sensation in tact  HEAD:  Atraumatic, Normocephalic  EYES: conjunctiva and sclera clear  NECK: Supple, No JVD, no lymphadenopathy, no thyromegaly  CHEST/LUNG: Clear to auscultation bilaterally; No wheezes, rales or rhonchi  HEART: Regular rate and rhythm; No murmurs, rubs, or gallops  ABDOMEN: Soft, Nontender, Nondistended; Bowel sounds present, no masses.  EXTREMITIES:  2+ Peripheral Pulses, No clubbing, cyanosis. Left upper thigh with erythema, also tender to palpation, edema laterally. No TTP behind L. knee or in L. calf. R leg with no abnormalities. +Melecio's sign on left.  SKIN: Warm, dry, in tact, no rashes or lesions other than described in L. extremity.   PSYCH: affect appropriate    LABS:                        11.9   8.5   )-----------( 264      ( 04 Mar 2018 17:14 )             35.7   PT/INR - ( 04 Mar 2018 10:17 )   PT: 9.9 sec;   INR: 0.92 ratio         PTT - ( 05 Mar 2018 01:08 )  PTT:189.2 sec    I&O's Summary    Jeferson Devries MD  Internal Medicine PGY-1  Pager: [phone]/ LARRY [phone]  After 7 PM on weekdays and 12 PM on weekends page [phone] 26yo  @ 38 weeks gestation with new left-sided DVT (common femoral, femoral, greater saphenous) diagnosed at 34 weeks, admitted for C section for breech positioning. She initially presented with acute left lower extremity pain and swelling. Imaging subsequently revealed DVT in L. common femoral vein, proximal greater saphenous, and femoral vein. She was started on lovenox injections twice daily. Patient had APL testing-- all reportedly positive.     At the time of initial symptoms patient was evaluated at [facility] by Dr. Stephens, hematologist who  was concerned for underlying May Thurner syndrome, and recommended continuing a/c following delivery (lovenox vs. coumadin vs. NOAC, depending on nursing status). She was tranferred to [facility] at approx 36 weeks, where she was evaluated by [facility] who determined that there is no indication for urgent delivery. She was hemodynamically stable and thus thrombectomy vs. thrombolysis was not pursued.    She was admitted today or planned C section given breech positioning of baby. PM and AM dose of lovenox has been held and hep gtt started in anticipation of 1:30 pm C section with current plan to resume lovenox 6-12 hours post op, to be continued for 6 wks to 3 months postpartum.    Currently patient feels well, denies CP/SOB. Endorses mild pain in left upper thigh upon movement. Concerned that she has not had U/S to monitor clot progression in >1 months. Plans on breast feeding. Denies family or personal history of thrombosis. No medications, allergies, PSHx, PMHx. No other complications with this pregnancy. She has not had ultrasound or other imaging to evaluate for May Thurner. Says that last dose of lovenox was Saturday 3/3 PM and has been on heparin gtt since admission on Pelon 3/4.    MEDICATIONS  (STANDING):  prenatal multivitamin 1 Tablet(s) Oral daily    Vitals  T(C): 36.5 (18 @ 05:29), Max: 36.5 (18 @ 21:21)  HR: 71 (18 @ 05:29) (63 - 81)  BP: 112/77 (18 @ 05:29) (108/75 - 122/81)  RR: 18 (18 @ 05:29) (18 - 18)  SpO2: 99% (18 @ 05:29) (96% - 99%)    PHYSICAL EXAM  GENERAL: NAD, well-developed pregnant female  NEURO: AO x3, PERRLA, EOMI, motor strength in tact in 4/4 extremities, sensation in tact  HEAD:  Atraumatic, Normocephalic  EYES: conjunctiva and sclera clear  NECK: Supple, No JVD, no lymphadenopathy, no thyromegaly  CHEST/LUNG: Clear to auscultation bilaterally; No wheezes, rales or rhonchi  HEART: Regular rate and rhythm; No murmurs, rubs, or gallops  ABDOMEN: Soft, Nontender, Nondistended; Bowel sounds present, no masses.  EXTREMITIES:  2+ Peripheral Pulses, No clubbing, cyanosis. Left upper thigh with erythema, also tender to palpation, edema laterally. No TTP behind L. knee or in L. calf. R leg with no abnormalities. +Melecio's sign on left.  SKIN: Warm, dry, in tact, no rashes or lesions other than described in L. extremity.   PSYCH: affect appropriate    LABS:                        11.9   8.5   )-----------( 264      ( 04 Mar 2018 17:14 )             35.7   PT/INR - ( 04 Mar 2018 10:17 )   PT: 9.9 sec;   INR: 0.92 ratio         PTT - ( 05 Mar 2018 01:08 )  PTT:189.2 sec    I&O's Summary    Jeferson Devries MD  Internal Medicine PGY-1  Pager: [phone]/ LARRY [phone]  After 7 PM on weekdays and 12 PM on weekends page [phone]

## 2018-03-06 ENCOUNTER — TRANSCRIPTION ENCOUNTER (OUTPATIENT)
Age: 28
End: 2018-03-06

## 2018-03-06 LAB
APTT BLD: 31.5 SEC — SIGNIFICANT CHANGE UP (ref 27.5–37.4)
APTT BLD: > 200 SEC (ref 27.5–37.4)
HCT VFR BLD CALC: 31.4 % — LOW (ref 34.5–45)
HGB BLD-MCNC: 10.5 G/DL — LOW (ref 11.5–15.5)
INR BLD: 0.94 RATIO — SIGNIFICANT CHANGE UP (ref 0.88–1.16)
MCHC RBC-ENTMCNC: 26.1 PG — LOW (ref 27–34)
MCHC RBC-ENTMCNC: 33.6 GM/DL — SIGNIFICANT CHANGE UP (ref 32–36)
MCV RBC AUTO: 77.6 FL — LOW (ref 80–100)
PLATELET # BLD AUTO: 224 K/UL — SIGNIFICANT CHANGE UP (ref 150–400)
PROTHROM AB SERPL-ACNC: 10.1 SEC — SIGNIFICANT CHANGE UP (ref 9.8–12.7)
RBC # BLD: 4.04 M/UL — SIGNIFICANT CHANGE UP (ref 3.8–5.2)
RBC # FLD: 14.8 % — HIGH (ref 10.3–14.5)
WBC # BLD: 13.1 K/UL — HIGH (ref 3.8–10.5)
WBC # FLD AUTO: 13.1 K/UL — HIGH (ref 3.8–10.5)

## 2018-03-06 RX ORDER — HEPARIN SODIUM 5000 [USP'U]/ML
4000 INJECTION INTRAVENOUS; SUBCUTANEOUS EVERY 6 HOURS
Qty: 0 | Refills: 0 | Status: DISCONTINUED | OUTPATIENT
Start: 2018-03-06 | End: 2018-03-06

## 2018-03-06 RX ORDER — HEPARIN SODIUM 5000 [USP'U]/ML
8500 INJECTION INTRAVENOUS; SUBCUTANEOUS EVERY 6 HOURS
Qty: 0 | Refills: 0 | Status: DISCONTINUED | OUTPATIENT
Start: 2018-03-06 | End: 2018-03-06

## 2018-03-06 RX ORDER — HEPARIN SODIUM 5000 [USP'U]/ML
8500 INJECTION INTRAVENOUS; SUBCUTANEOUS ONCE
Qty: 0 | Refills: 0 | Status: COMPLETED | OUTPATIENT
Start: 2018-03-06 | End: 2018-03-06

## 2018-03-06 RX ORDER — HEPARIN SODIUM 5000 [USP'U]/ML
INJECTION INTRAVENOUS; SUBCUTANEOUS
Qty: 25000 | Refills: 0 | Status: DISCONTINUED | OUTPATIENT
Start: 2018-03-06 | End: 2018-03-06

## 2018-03-06 RX ORDER — ENOXAPARIN SODIUM 100 MG/ML
110 INJECTION SUBCUTANEOUS
Qty: 0 | Refills: 0 | Status: DISCONTINUED | OUTPATIENT
Start: 2018-03-06 | End: 2018-03-07

## 2018-03-06 RX ADMIN — Medication 975 MILLIGRAM(S): at 12:14

## 2018-03-06 RX ADMIN — Medication 975 MILLIGRAM(S): at 18:59

## 2018-03-06 RX ADMIN — HEPARIN SODIUM 1500 UNIT(S)/HR: 5000 INJECTION INTRAVENOUS; SUBCUTANEOUS at 11:03

## 2018-03-06 RX ADMIN — Medication 30 MILLIGRAM(S): at 12:14

## 2018-03-06 RX ADMIN — ENOXAPARIN SODIUM 110 MILLIGRAM(S): 100 INJECTION SUBCUTANEOUS at 18:54

## 2018-03-06 RX ADMIN — HEPARIN SODIUM 8500 UNIT(S): 5000 INJECTION INTRAVENOUS; SUBCUTANEOUS at 01:24

## 2018-03-06 RX ADMIN — Medication 975 MILLIGRAM(S): at 02:34

## 2018-03-06 RX ADMIN — Medication 975 MILLIGRAM(S): at 12:46

## 2018-03-06 RX ADMIN — HEPARIN SODIUM 0 UNIT(S)/HR: 5000 INJECTION INTRAVENOUS; SUBCUTANEOUS at 11:02

## 2018-03-06 RX ADMIN — Medication 30 MILLIGRAM(S): at 06:07

## 2018-03-06 RX ADMIN — Medication 975 MILLIGRAM(S): at 02:00

## 2018-03-06 RX ADMIN — Medication 1 TABLET(S): at 12:15

## 2018-03-06 RX ADMIN — ONDANSETRON 4 MILLIGRAM(S): 8 TABLET, FILM COATED ORAL at 17:38

## 2018-03-06 RX ADMIN — Medication 975 MILLIGRAM(S): at 23:59

## 2018-03-06 RX ADMIN — HEPARIN SODIUM 1800 UNIT(S)/HR: 5000 INJECTION INTRAVENOUS; SUBCUTANEOUS at 01:51

## 2018-03-06 RX ADMIN — Medication 30 MILLIGRAM(S): at 06:49

## 2018-03-06 RX ADMIN — Medication 975 MILLIGRAM(S): at 06:07

## 2018-03-06 RX ADMIN — Medication 30 MILLIGRAM(S): at 18:59

## 2018-03-06 RX ADMIN — Medication 325 MILLIGRAM(S): at 12:14

## 2018-03-06 RX ADMIN — Medication 975 MILLIGRAM(S): at 06:49

## 2018-03-06 RX ADMIN — Medication 30 MILLIGRAM(S): at 12:46

## 2018-03-06 RX ADMIN — Medication 30 MILLIGRAM(S): at 23:58

## 2018-03-06 NOTE — PROVIDER CONTACT NOTE (CRITICAL VALUE NOTIFICATION) - RECOMMENDATIONS
educated pt of risk on being on Heparin drip and to notify nurse if any abnormal bleeding or heavy vaginal bleeding. stopped heparin drip for 1hr as per protocol and will restart at -3units/hour

## 2018-03-06 NOTE — PROVIDER CONTACT NOTE (CRITICAL VALUE NOTIFICATION) - BACKGROUND
pt is s/p c/s day 1 with a Hx of LLE DVT on Heparin 18units. 8500 bolus was giving at 0124 and maintenance started at 0152
38 weeks preg with Left lower extremity DVT on Heparin drip for scheduled C/S

## 2018-03-06 NOTE — PROGRESS NOTE ADULT - PROBLEM SELECTOR PLAN 1
- Continue with PCA, will transition to PO analgesia once tolerating PO  - Increase ambulation  - Advance to regular diet as tolerated  - Follow up am CBC  - D/c Mihir

## 2018-03-06 NOTE — PROGRESS NOTE ADULT - PROBLEM SELECTOR PLAN 2
- Continue Heparin gtt, started at 1am today, will transition to lovenox after 24 hours.    - Hematology following appreciate reccomendations  - Trend PTT currently subtherapeutic at 31  - No SCDs due to lower extremity DVT    SELAM Beasley PGY3

## 2018-03-06 NOTE — PROGRESS NOTE ADULT - PROBLEM SELECTOR PLAN 1
-continue with hep gtt  -transition to lovenox 1 mg/kg BID once patient has been on heparin x 24 hours   -continue to monitor blood loss.   -if dizziness persists, please check orthostatic vital signs and CBC to check for anemia  -In terms of long term a/c planning, prior blood work did show elevated APL labs however these labs have to be repeated in 12 weeks to observe persistence of antibodies to make a definitive diagnosis of APLS.  Plan to d/c patient with lovenox x 6 weeks with plan to follow up with Dr. Hernandez during this time. -continue with hep gtt. Once heparin stopped can resume lovenox at prior dose BID 2 hours after.  -continue to monitor blood loss.   -daily CBC  -if dizziness persists, please check orthostatic vital signs and CBC to check for anemia  -In terms of long term a/c planning, prior blood work did show elevated APL labs however these labs have to be repeated in 12 weeks to observe persistence of antibodies to make a definitive diagnosis of APLS.  Plan to d/c patient with lovenox x 6 weeks with plan to follow up with Dr. Hernandez during this time.

## 2018-03-07 DIAGNOSIS — O22.30 DEEP PHLEBOTHROMBOSIS IN PREGNANCY, UNSPECIFIED TRIMESTER: ICD-10-CM

## 2018-03-07 LAB
HCT VFR BLD CALC: 29.2 % — LOW (ref 34.5–45)
HGB BLD-MCNC: 9.6 G/DL — LOW (ref 11.5–15.5)
MCHC RBC-ENTMCNC: 25.8 PG — LOW (ref 27–34)
MCHC RBC-ENTMCNC: 32.9 GM/DL — SIGNIFICANT CHANGE UP (ref 32–36)
MCV RBC AUTO: 78.5 FL — LOW (ref 80–100)
PLATELET # BLD AUTO: 206 K/UL — SIGNIFICANT CHANGE UP (ref 150–400)
RBC # BLD: 3.72 M/UL — LOW (ref 3.8–5.2)
RBC # FLD: 15.2 % — HIGH (ref 10.3–14.5)
WBC # BLD: 10.9 K/UL — HIGH (ref 3.8–10.5)
WBC # FLD AUTO: 10.9 K/UL — HIGH (ref 3.8–10.5)

## 2018-03-07 RX ORDER — IBUPROFEN 200 MG
600 TABLET ORAL EVERY 6 HOURS
Qty: 0 | Refills: 0 | Status: DISCONTINUED | OUTPATIENT
Start: 2018-03-07 | End: 2018-03-08

## 2018-03-07 RX ORDER — ENOXAPARIN SODIUM 100 MG/ML
100 INJECTION SUBCUTANEOUS EVERY 12 HOURS
Qty: 0 | Refills: 0 | Status: DISCONTINUED | OUTPATIENT
Start: 2018-03-07 | End: 2018-03-08

## 2018-03-07 RX ORDER — OXYCODONE HYDROCHLORIDE 5 MG/1
5 TABLET ORAL EVERY 4 HOURS
Qty: 0 | Refills: 0 | Status: DISCONTINUED | OUTPATIENT
Start: 2018-03-07 | End: 2018-03-08

## 2018-03-07 RX ORDER — OXYCODONE HYDROCHLORIDE 5 MG/1
5 TABLET ORAL
Qty: 0 | Refills: 0 | Status: DISCONTINUED | OUTPATIENT
Start: 2018-03-07 | End: 2018-03-08

## 2018-03-07 RX ADMIN — Medication 30 MILLIGRAM(S): at 00:00

## 2018-03-07 RX ADMIN — Medication 30 MILLIGRAM(S): at 18:26

## 2018-03-07 RX ADMIN — OXYCODONE HYDROCHLORIDE 5 MILLIGRAM(S): 5 TABLET ORAL at 18:00

## 2018-03-07 RX ADMIN — OXYCODONE HYDROCHLORIDE 5 MILLIGRAM(S): 5 TABLET ORAL at 13:30

## 2018-03-07 RX ADMIN — Medication 975 MILLIGRAM(S): at 06:29

## 2018-03-07 RX ADMIN — Medication 975 MILLIGRAM(S): at 00:00

## 2018-03-07 RX ADMIN — Medication 30 MILLIGRAM(S): at 12:52

## 2018-03-07 RX ADMIN — ENOXAPARIN SODIUM 110 MILLIGRAM(S): 100 INJECTION SUBCUTANEOUS at 06:28

## 2018-03-07 RX ADMIN — Medication 975 MILLIGRAM(S): at 13:30

## 2018-03-07 RX ADMIN — OXYCODONE HYDROCHLORIDE 5 MILLIGRAM(S): 5 TABLET ORAL at 13:04

## 2018-03-07 RX ADMIN — Medication 975 MILLIGRAM(S): at 18:27

## 2018-03-07 RX ADMIN — Medication 975 MILLIGRAM(S): at 12:52

## 2018-03-07 RX ADMIN — Medication 325 MILLIGRAM(S): at 12:53

## 2018-03-07 RX ADMIN — SIMETHICONE 80 MILLIGRAM(S): 80 TABLET, CHEWABLE ORAL at 19:40

## 2018-03-07 RX ADMIN — Medication 975 MILLIGRAM(S): at 19:15

## 2018-03-07 RX ADMIN — Medication 30 MILLIGRAM(S): at 13:30

## 2018-03-07 RX ADMIN — Medication 100 MILLIGRAM(S): at 13:05

## 2018-03-07 RX ADMIN — Medication 1 TABLET(S): at 12:52

## 2018-03-07 RX ADMIN — OXYCODONE HYDROCHLORIDE 5 MILLIGRAM(S): 5 TABLET ORAL at 17:10

## 2018-03-07 RX ADMIN — Medication 30 MILLIGRAM(S): at 06:28

## 2018-03-07 RX ADMIN — Medication 30 MILLIGRAM(S): at 19:15

## 2018-03-07 RX ADMIN — ENOXAPARIN SODIUM 100 MILLIGRAM(S): 100 INJECTION SUBCUTANEOUS at 18:27

## 2018-03-08 ENCOUNTER — TRANSCRIPTION ENCOUNTER (OUTPATIENT)
Age: 28
End: 2018-03-08

## 2018-03-08 VITALS
TEMPERATURE: 98 F | RESPIRATION RATE: 18 BRPM | SYSTOLIC BLOOD PRESSURE: 112 MMHG | HEART RATE: 79 BPM | DIASTOLIC BLOOD PRESSURE: 79 MMHG

## 2018-03-08 LAB
BASOPHILS # BLD AUTO: 0 K/UL — SIGNIFICANT CHANGE UP (ref 0–0.2)
BASOPHILS NFR BLD AUTO: 0.5 % — SIGNIFICANT CHANGE UP (ref 0–2)
EOSINOPHIL # BLD AUTO: 0.2 K/UL — SIGNIFICANT CHANGE UP (ref 0–0.5)
EOSINOPHIL NFR BLD AUTO: 2.2 % — SIGNIFICANT CHANGE UP (ref 0–6)
HCT VFR BLD CALC: 27.5 % — LOW (ref 34.5–45)
HGB BLD-MCNC: 9.3 G/DL — LOW (ref 11.5–15.5)
LYMPHOCYTES # BLD AUTO: 1.6 K/UL — SIGNIFICANT CHANGE UP (ref 1–3.3)
LYMPHOCYTES # BLD AUTO: 17.8 % — SIGNIFICANT CHANGE UP (ref 13–44)
MCHC RBC-ENTMCNC: 26.7 PG — LOW (ref 27–34)
MCHC RBC-ENTMCNC: 33.8 GM/DL — SIGNIFICANT CHANGE UP (ref 32–36)
MCV RBC AUTO: 79 FL — LOW (ref 80–100)
MONOCYTES # BLD AUTO: 0.6 K/UL — SIGNIFICANT CHANGE UP (ref 0–0.9)
MONOCYTES NFR BLD AUTO: 6.2 % — SIGNIFICANT CHANGE UP (ref 2–14)
NEUTROPHILS # BLD AUTO: 6.7 K/UL — SIGNIFICANT CHANGE UP (ref 1.8–7.4)
NEUTROPHILS NFR BLD AUTO: 73.4 % — SIGNIFICANT CHANGE UP (ref 43–77)
PLATELET # BLD AUTO: 228 K/UL — SIGNIFICANT CHANGE UP (ref 150–400)
RBC # BLD: 3.48 M/UL — LOW (ref 3.8–5.2)
RBC # FLD: 15.1 % — HIGH (ref 10.3–14.5)
WBC # BLD: 9.2 K/UL — SIGNIFICANT CHANGE UP (ref 3.8–10.5)
WBC # FLD AUTO: 9.2 K/UL — SIGNIFICANT CHANGE UP (ref 3.8–10.5)

## 2018-03-08 RX ORDER — ENOXAPARIN SODIUM 100 MG/ML
100 INJECTION SUBCUTANEOUS
Qty: 60 | Refills: 0
Start: 2018-03-08 | End: 2018-05-06

## 2018-03-08 RX ORDER — IBUPROFEN 200 MG
1 TABLET ORAL
Qty: 0 | Refills: 0 | DISCHARGE
Start: 2018-03-08

## 2018-03-08 RX ORDER — OXYCODONE HYDROCHLORIDE 5 MG/1
1 TABLET ORAL
Qty: 15 | Refills: 0
Start: 2018-03-08 | End: 2018-03-12

## 2018-03-08 RX ADMIN — ENOXAPARIN SODIUM 100 MILLIGRAM(S): 100 INJECTION SUBCUTANEOUS at 06:23

## 2018-03-08 RX ADMIN — ENOXAPARIN SODIUM 100 MILLIGRAM(S): 100 INJECTION SUBCUTANEOUS at 16:15

## 2018-03-08 RX ADMIN — Medication 600 MILLIGRAM(S): at 06:23

## 2018-03-08 RX ADMIN — Medication 975 MILLIGRAM(S): at 13:30

## 2018-03-08 RX ADMIN — Medication 325 MILLIGRAM(S): at 12:44

## 2018-03-08 RX ADMIN — Medication 600 MILLIGRAM(S): at 00:23

## 2018-03-08 RX ADMIN — Medication 975 MILLIGRAM(S): at 01:00

## 2018-03-08 RX ADMIN — Medication 975 MILLIGRAM(S): at 12:43

## 2018-03-08 RX ADMIN — OXYCODONE HYDROCHLORIDE 5 MILLIGRAM(S): 5 TABLET ORAL at 01:00

## 2018-03-08 RX ADMIN — Medication 600 MILLIGRAM(S): at 07:03

## 2018-03-08 RX ADMIN — Medication 1 TABLET(S): at 12:43

## 2018-03-08 RX ADMIN — Medication 975 MILLIGRAM(S): at 07:03

## 2018-03-08 RX ADMIN — OXYCODONE HYDROCHLORIDE 5 MILLIGRAM(S): 5 TABLET ORAL at 00:29

## 2018-03-08 RX ADMIN — Medication 100 MILLIGRAM(S): at 06:23

## 2018-03-08 RX ADMIN — OXYCODONE HYDROCHLORIDE 5 MILLIGRAM(S): 5 TABLET ORAL at 12:44

## 2018-03-08 RX ADMIN — OXYCODONE HYDROCHLORIDE 5 MILLIGRAM(S): 5 TABLET ORAL at 13:30

## 2018-03-08 RX ADMIN — Medication 600 MILLIGRAM(S): at 01:00

## 2018-03-08 RX ADMIN — Medication 600 MILLIGRAM(S): at 13:30

## 2018-03-08 RX ADMIN — Medication 975 MILLIGRAM(S): at 00:23

## 2018-03-08 RX ADMIN — Medication 600 MILLIGRAM(S): at 12:43

## 2018-03-08 RX ADMIN — Medication 975 MILLIGRAM(S): at 06:23

## 2018-03-08 RX ADMIN — OXYCODONE HYDROCHLORIDE 5 MILLIGRAM(S): 5 TABLET ORAL at 06:23

## 2018-03-08 NOTE — PROGRESS NOTE ADULT - SUBJECTIVE AND OBJECTIVE BOX
Day 1 of Anesthesia Pain Management Service    SUBJECTIVE: Patient is doing well with IV PCA    Pain Scale Score:	[X] Refer to charted pain scores    THERAPY:    [ ] IV PCA Morphine		[ ] 5 mg/mL	[ ] 1 mg/mL  [X] IV PCA Hydromorphone	[ ] 5 mg/mL	[X] 1 mg/mL  [ ] IV PCA Fentanyl		[ ] 50 micrograms/mL    Demand dose: 0.2 mg     Lockout: 6 minutes   Continuous Rate: 0 mg/hr  4 Hour Limit: 4 mg    MEDICATIONS  (STANDING):  acetaminophen   Tablet. 975 milliGRAM(s) Oral every 6 hours  diphtheria/tetanus/pertussis (acellular) Vaccine (ADAcel) 0.5 milliLiter(s) IntraMuscular once  ferrous    sulfate 325 milliGRAM(s) Oral daily  heparin  Infusion.  Unit(s)/Hr (18 mL/Hr) IV Continuous <Continuous>  HYDROmorphone PCA (1 mG/mL) 30 milliLiter(s) PCA Continuous PCA Continuous  ibuprofen  Tablet 600 milliGRAM(s) Oral every 6 hours  ketorolac   Injectable 30 milliGRAM(s) IV Push every 6 hours  lactated ringers. 1000 milliLiter(s) (75 mL/Hr) IV Continuous <Continuous>  lactated ringers. 1000 milliLiter(s) (125 mL/Hr) IV Continuous <Continuous>  oxyCODONE    IR 5 milliGRAM(s) Oral every 3 hours  oxytocin Infusion 333.333 milliUNIT(s)/Min (1000 mL/Hr) IV Continuous <Continuous>  oxytocin Infusion 41.667 milliUNIT(s)/Min (125 mL/Hr) IV Continuous <Continuous>  prenatal multivitamin 1 Tablet(s) Oral daily    MEDICATIONS  (PRN):  diphenhydrAMINE   Capsule 25 milliGRAM(s) Oral every 6 hours PRN Itching  diphenhydrAMINE   Injectable 25 milliGRAM(s) IV Push every 4 hours PRN Pruritus  docusate sodium 100 milliGRAM(s) Oral two times a day PRN Stool Softening  glycerin Suppository - Adult 1 Suppository(s) Rectal at bedtime PRN Constipation  heparin  Injectable 8500 Unit(s) IV Push every 6 hours PRN For aPTT less than 40  heparin  Injectable 4000 Unit(s) IV Push every 6 hours PRN For aPTT between 40 - 57  HYDROmorphone PCA (1 mG/mL) Rescue Clinician Bolus 0.5 milliGRAM(s) IV Push every 15 minutes PRN for Pain Scale GREATER THAN 6  lanolin Ointment 1 Application(s) Topical every 3 hours PRN Sore Nipples  naloxone Injectable 0.1 milliGRAM(s) IV Push every 3 minutes PRN For ANY of the following changes in patient status:  A. RR LESS THAN 10 breaths per minute, B. Oxygen saturation LESS THAN 90%, C. Sedation score of 6  ondansetron Injectable 4 milliGRAM(s) IV Push every 6 hours PRN Nausea  oxyCODONE    IR 5 milliGRAM(s) Oral every 4 hours PRN Severe Pain (7 - 10)  simethicone 80 milliGRAM(s) Chew every 4 hours PRN Gas      OBJECTIVE:    Sedation Score:	[ X] Alert	[ ] Drowsy 	[ ] Arousable	[ ] Asleep	[ ] Unresponsive    Side Effects:	[X ] None	[ ] Nausea	[ ] Vomiting	[ ] Pruritus  		[ ] Other:    Vital Signs Last 24 Hrs  T(C): 36.4 (06 Mar 2018 08:06), Max: 36.8 (06 Mar 2018 05:32)  T(F): 97.5 (06 Mar 2018 08:06), Max: 98.2 (06 Mar 2018 05:32)  HR: 88 (06 Mar 2018 08:06) (56 - 106)  BP: 117/84 (06 Mar 2018 08:06) (109/73 - 141/82)  BP(mean): 95 (05 Mar 2018 21:30) (95 - 112)  RR: 18 (06 Mar 2018 08:06) (16 - 29)  SpO2: 97% (06 Mar 2018 08:06) (97% - 100%)    ASSESSMENT/ PLAN    Therapy to  be:               [X] Continued   [ ] Discontinued   [ ] Changed to PRN Analgesics    Documentation and Verification of current medications:   [X] Done	[ ] Not done, not eligible    Comments:
Day 2 of Anesthesia Pain Management Service    SUBJECTIVE: Patient is doing well with IV PCA    Pain Scale Score:	[X] Refer to charted pain scores    THERAPY:    [ ] IV PCA Morphine		[ ] 5 mg/mL	[ ] 1 mg/mL  [X] IV PCA Hydromorphone	[ ] 5 mg/mL	[X] 1 mg/mL  [ ] IV PCA Fentanyl		[ ] 50 micrograms/mL    Demand dose: 0.2 mg     Lockout: 6 minutes   Continuous Rate: 0 mg/hr  4 Hour Limit: 4 mg    MEDICATIONS  (STANDING):  acetaminophen   Tablet. 975 milliGRAM(s) Oral every 6 hours  diphtheria/tetanus/pertussis (acellular) Vaccine (ADAcel) 0.5 milliLiter(s) IntraMuscular once  enoxaparin Injectable 110 milliGRAM(s) SubCutaneous two times a day  ferrous    sulfate 325 milliGRAM(s) Oral daily  ibuprofen  Tablet 600 milliGRAM(s) Oral every 6 hours  ketorolac   Injectable 30 milliGRAM(s) IV Push every 6 hours  oxyCODONE    IR 5 milliGRAM(s) Oral every 3 hours  prenatal multivitamin 1 Tablet(s) Oral daily    MEDICATIONS  (PRN):  diphenhydrAMINE   Capsule 25 milliGRAM(s) Oral every 6 hours PRN Itching  docusate sodium 100 milliGRAM(s) Oral two times a day PRN Stool Softening  glycerin Suppository - Adult 1 Suppository(s) Rectal at bedtime PRN Constipation  lanolin Ointment 1 Application(s) Topical every 3 hours PRN Sore Nipples  oxyCODONE    IR 5 milliGRAM(s) Oral every 4 hours PRN Severe Pain (7 - 10)  simethicone 80 milliGRAM(s) Chew every 4 hours PRN Gas      OBJECTIVE:    Sedation Score:	[ X] Alert	[ ] Drowsy 	[ ] Arousable	[ ] Asleep	[ ] Unresponsive    Side Effects:	[X ] None	[ ] Nausea	[ ] Vomiting	[ ] Pruritus  		[ ] Other:    Vital Signs Last 24 Hrs  T(C): 36.7 (07 Mar 2018 05:58), Max: 36.7 (07 Mar 2018 05:58)  T(F): 98.1 (07 Mar 2018 05:58), Max: 98.1 (07 Mar 2018 05:58)  HR: 96 (07 Mar 2018 05:58) (85 - 96)  BP: 105/74 (07 Mar 2018 05:58) (94/59 - 107/71)  BP(mean): --  RR: 18 (07 Mar 2018 05:58) (18 - 18)  SpO2: 99% (07 Mar 2018 05:58) (95% - 99%)    ASSESSMENT/ PLAN    Therapy to  be:               [ ] Continued   [X] Discontinued   [ ] Changed to PRN Analgesics    Documentation and Verification of current medications:   [X] Done	[ ] Not done, not eligible    Comments:
Pain Management Attending Addendum    SUBJECTIVE:    Therapy:	  [ X ] IV PCA	   [ ] Epidural           [ ] s/p Spinal Opoid              [ ] Postpartum infusion	  [ ] Patient controlled regional anesthesia (PCRA)    [ ] prn Analgesics    OBJECTIVE:   [ X ] No new signs     [ ] Other:    Side Effects:  [ X ] None			[ ] Other:    Assessment of Catheter Site:		[ ] Intact		[ ] Other:    ASSESSMENT/PLAN  [ ] Continue current therapy    [ ] Therapy changed to:    [ ] IV PCA       [ ] Epidural     [ X ] prn Analgesics     [ ] post partum infusion    Comments:
Pain Management Attending Addendum    SUBJECTIVE: Patient doing well with IV PCA    Therapy:    [X] IV PCA         [ ] PRN Analgesics    OBJECTIVE:   [X] Pain appropriately controlled    [ ] Other:    Side Effects:  [ ] None	             [X ] Nausea              [ ] Pruritis                	[ ] Other:    ASSESSMENT/PLAN: Continue current therapy    Comments:
Patient seen and examined at bedside, no acute overnight events. No acute complaints, pain well controlled with PCA.  Yesterday evening pt with emesis and nausea w/ clears, none since, no PO yet this am.  OOBx1 yesterday.  No flatus, no BM.  Ash in place.  Not lightheaded, dizzy, weak, no chest pain or SOB .  No calf tenderness.    Physical Exam:  Vital Signs Last 24 Hours  T(C): 36.8 (03-06-18 @ 05:32), Max: 36.8 (03-06-18 @ 05:32)  HR: 101 (03-06-18 @ 05:32) (56 - 106)  BP: 121/79 (03-06-18 @ 05:32) (109/73 - 141/82)  RR: 18 (03-06-18 @ 05:32) (16 - 29)  SpO2: 98% (03-06-18 @ 05:32) (97% - 100%)  General: NAD  Abdomen: Soft, non-tender, non-distended, fundus firm  Incision: Pfannenstiel incision CDI, subcuticular suture closure  Pelvic: Lochia wnl    I&O's Summary    05 Mar 2018 07:01  -  06 Mar 2018 06:56  --------------------------------------------------------  IN: 2000 mL / OUT: 3100 mL / NET: -1100 mL    Labs:    Blood Type: O Positive  Antibody Screen: Negative  Rubella IgG: Positive (Positive=Immune, Negative=Non-Immune)  RPR: Negative               12.9   9.6   )-----------( 268      ( 03-05 @ 12:19 )             38.0     -> follow up AM CBC    Last PTT (midnight): 31    MEDICATIONS  (STANDING):  acetaminophen   Tablet. 975 milliGRAM(s) Oral every 6 hours  diphtheria/tetanus/pertussis (acellular) Vaccine (ADAcel) 0.5 milliLiter(s) IntraMuscular once  ferrous    sulfate 325 milliGRAM(s) Oral daily  heparin  Infusion.  Unit(s)/Hr (18 mL/Hr) IV Continuous <Continuous>  HYDROmorphone PCA (1 mG/mL) 30 milliLiter(s) PCA Continuous PCA Continuous  ibuprofen  Tablet 600 milliGRAM(s) Oral every 6 hours  ketorolac   Injectable 30 milliGRAM(s) IV Push every 6 hours  lactated ringers. 1000 milliLiter(s) (75 mL/Hr) IV Continuous <Continuous>  lactated ringers. 1000 milliLiter(s) (125 mL/Hr) IV Continuous <Continuous>  oxyCODONE    IR 5 milliGRAM(s) Oral every 3 hours  oxytocin Infusion 333.333 milliUNIT(s)/Min (1000 mL/Hr) IV Continuous <Continuous>  oxytocin Infusion 41.667 milliUNIT(s)/Min (125 mL/Hr) IV Continuous <Continuous>  prenatal multivitamin 1 Tablet(s) Oral daily    MEDICATIONS  (PRN):  diphenhydrAMINE   Capsule 25 milliGRAM(s) Oral every 6 hours PRN Itching  diphenhydrAMINE   Injectable 25 milliGRAM(s) IV Push every 4 hours PRN Pruritus  docusate sodium 100 milliGRAM(s) Oral two times a day PRN Stool Softening  glycerin Suppository - Adult 1 Suppository(s) Rectal at bedtime PRN Constipation  heparin  Injectable 8500 Unit(s) IV Push every 6 hours PRN For aPTT less than 40  heparin  Injectable 4000 Unit(s) IV Push every 6 hours PRN For aPTT between 40 - 57  HYDROmorphone PCA (1 mG/mL) Rescue Clinician Bolus 0.5 milliGRAM(s) IV Push every 15 minutes PRN for Pain Scale GREATER THAN 6  lanolin Ointment 1 Application(s) Topical every 3 hours PRN Sore Nipples  naloxone Injectable 0.1 milliGRAM(s) IV Push every 3 minutes PRN For ANY of the following changes in patient status:  A. RR LESS THAN 10 breaths per minute, B. Oxygen saturation LESS THAN 90%, C. Sedation score of 6  ondansetron Injectable 4 milliGRAM(s) IV Push every 6 hours PRN Nausea  oxyCODONE    IR 5 milliGRAM(s) Oral every 4 hours PRN Severe Pain (7 - 10)  simethicone 80 milliGRAM(s) Chew every 4 hours PRN Gas
R3 Postpartum Progress Note: P0D 2    Patient seen and examined at bedside, no acute overnight events. No acute complaints, pain well controlled. OOB. Tolerating PO. Voiding. Not lightheaded, dizzy, weak, no chest pain or SOB .  No calf tenderness.    ICU Vital Signs Last 24 Hrs  T(C): 36.7 (07 Mar 2018 05:58), Max: 36.7 (07 Mar 2018 05:58)  T(F): 98.1 (07 Mar 2018 05:58), Max: 98.1 (07 Mar 2018 05:58)  HR: 96 (07 Mar 2018 05:58) (85 - 96)  BP: 105/74 (07 Mar 2018 05:58) (94/59 - 117/84)  RR: 18 (07 Mar 2018 05:58) (18 - 18)  SpO2: 99% (07 Mar 2018 05:58) (95% - 99%)    Physical Exam:  General: NAD  Abdomen: Soft, non-tender, non-distended, fundus firm  Incision: Pfannenstiel incision CDI, subcuticular suture closure  Pelvic: Lochia wnl      Labs:    Blood Type: O Positive  Antibody Screen: Negative  Rubella IgG: Positive (Positive=Immune, Negative=Non-Immune)  RPR: Negative                          10.5   13.1  )-----------( 224      ( 06 Mar 2018 08:50 )             31.4                12.9   9.6   )-----------( 268      ( 03-05 @ 12:19 )             38.0         MEDICATIONS  (STANDING):  acetaminophen   Tablet. 975 milliGRAM(s) Oral every 6 hours  diphtheria/tetanus/pertussis (acellular) Vaccine (ADAcel) 0.5 milliLiter(s) IntraMuscular once  enoxaparin Injectable 110 milliGRAM(s) SubCutaneous two times a day  ferrous    sulfate 325 milliGRAM(s) Oral daily  HYDROmorphone PCA (1 mG/mL) 30 milliLiter(s) PCA Continuous PCA Continuous  ibuprofen  Tablet 600 milliGRAM(s) Oral every 6 hours  ketorolac   Injectable 30 milliGRAM(s) IV Push every 6 hours  oxyCODONE    IR 5 milliGRAM(s) Oral every 3 hours  prenatal multivitamin 1 Tablet(s) Oral daily    MEDICATIONS  (PRN):  diphenhydrAMINE   Capsule 25 milliGRAM(s) Oral every 6 hours PRN Itching  diphenhydrAMINE   Injectable 25 milliGRAM(s) IV Push every 4 hours PRN Pruritus  docusate sodium 100 milliGRAM(s) Oral two times a day PRN Stool Softening  glycerin Suppository - Adult 1 Suppository(s) Rectal at bedtime PRN Constipation  HYDROmorphone PCA (1 mG/mL) Rescue Clinician Bolus 0.5 milliGRAM(s) IV Push every 15 minutes PRN for Pain Scale GREATER THAN 6  lanolin Ointment 1 Application(s) Topical every 3 hours PRN Sore Nipples  naloxone Injectable 0.1 milliGRAM(s) IV Push every 3 minutes PRN For ANY of the following changes in patient status:  A. RR LESS THAN 10 breaths per minute, B. Oxygen saturation LESS THAN 90%, C. Sedation score of 6  ondansetron Injectable 4 milliGRAM(s) IV Push every 6 hours PRN Nausea  oxyCODONE    IR 5 milliGRAM(s) Oral every 4 hours PRN Severe Pain (7 - 10)  simethicone 80 milliGRAM(s) Chew every 4 hours PRN Gas
SUBJECTIVE / OVERNIGHT EVENTS: Had C section yesterday afternoon, delivered healthy female infant. Per notes and per patient no unexpected blood loss in OR. Lochia WNL. Mild pain at incision site. Patient feels well overall but was dizzy last night and again this AM, which she attributes to not eating. She just finished breakfast at time of this interview and says that she feels improved. No blurry vision, no CP, SOB, palpitations.     MEDICATIONS  (STANDING):  acetaminophen   Tablet. 975 milliGRAM(s) Oral every 6 hours  diphtheria/tetanus/pertussis (acellular) Vaccine (ADAcel) 0.5 milliLiter(s) IntraMuscular once  ferrous    sulfate 325 milliGRAM(s) Oral daily  heparin  Infusion.  Unit(s)/Hr (18 mL/Hr) IV Continuous <Continuous>  HYDROmorphone PCA (1 mG/mL) 30 milliLiter(s) PCA Continuous PCA Continuous  ibuprofen  Tablet 600 milliGRAM(s) Oral every 6 hours  ketorolac   Injectable 30 milliGRAM(s) IV Push every 6 hours  lactated ringers. 1000 milliLiter(s) (75 mL/Hr) IV Continuous <Continuous>  lactated ringers. 1000 milliLiter(s) (125 mL/Hr) IV Continuous <Continuous>  oxyCODONE    IR 5 milliGRAM(s) Oral every 3 hours  oxytocin Infusion 333.333 milliUNIT(s)/Min (1000 mL/Hr) IV Continuous <Continuous>  oxytocin Infusion 41.667 milliUNIT(s)/Min (125 mL/Hr) IV Continuous <Continuous>  prenatal multivitamin 1 Tablet(s) Oral daily    MEDICATIONS  (PRN):  diphenhydrAMINE   Capsule 25 milliGRAM(s) Oral every 6 hours PRN Itching  diphenhydrAMINE   Injectable 25 milliGRAM(s) IV Push every 4 hours PRN Pruritus  docusate sodium 100 milliGRAM(s) Oral two times a day PRN Stool Softening  glycerin Suppository - Adult 1 Suppository(s) Rectal at bedtime PRN Constipation  heparin  Injectable 8500 Unit(s) IV Push every 6 hours PRN For aPTT less than 40  heparin  Injectable 4000 Unit(s) IV Push every 6 hours PRN For aPTT between 40 - 57  HYDROmorphone PCA (1 mG/mL) Rescue Clinician Bolus 0.5 milliGRAM(s) IV Push every 15 minutes PRN for Pain Scale GREATER THAN 6  lanolin Ointment 1 Application(s) Topical every 3 hours PRN Sore Nipples  naloxone Injectable 0.1 milliGRAM(s) IV Push every 3 minutes PRN For ANY of the following changes in patient status:  A. RR LESS THAN 10 breaths per minute, B. Oxygen saturation LESS THAN 90%, C. Sedation score of 6  ondansetron Injectable 4 milliGRAM(s) IV Push every 6 hours PRN Nausea  oxyCODONE    IR 5 milliGRAM(s) Oral every 4 hours PRN Severe Pain (7 - 10)  simethicone 80 milliGRAM(s) Chew every 4 hours PRN Gas    Vitals  T(C): 36.4 (03-06-18 @ 08:06), Max: 36.8 (03-06-18 @ 05:32)  HR: 88 (03-06-18 @ 08:06) (56 - 106)  BP: 117/84 (03-06-18 @ 08:06) (109/73 - 141/82)  RR: 18 (03-06-18 @ 08:06) (16 - 29)  SpO2: 97% (03-06-18 @ 08:06) (97% - 100%)    PHYSICAL EXAM  GENERAL: NAD, well-developed female  NEURO: AO x3, PERRLA, EOMI, motor strength in tact in 4/4 extremities, sensation in tact  HEAD:  Atraumatic, Normocephalic  EYES: conjunctiva and sclera clear  NECK: Supple, No JVD, no lymphadenopathy, no thyromegaly  CHEST/LUNG: Clear to auscultation bilaterally; No wheezes, rales or rhonchi  HEART: Regular rate and rhythm; No murmurs, rubs, or gallops  ABDOMEN: Soft, Nontender, Nondistended; Bowel sounds present, no masses.  EXTREMITIES:  2+ Peripheral Pulses, No clubbing, cyanosis. Left upper thigh with erythema, also tender to palpation, edema laterally. No TTP behind L. knee or in L. calf. R leg with no abnormalities. +Melecio's sign on left.  SKIN: Warm, dry, in tact, no rashes or lesions other than described in L. extremity.   PSYCH: affect appropriate  LABS:                        10.5   13.1  )-----------( 224      ( 06 Mar 2018 08:50 )             31.4     03-05    136  |  101  |  8   ----------------------------<  79  4.4   |  25  |  0.66    Ca    9.3      05 Mar 2018 12:19      PT/INR - ( 06 Mar 2018 00:03 )   PT: 10.1 sec;   INR: 0.94 ratio         PTT - ( 06 Mar 2018 08:50 )  PTT:> 200 sec      I&O's Summary    05 Mar 2018 07:01  -  06 Mar 2018 07:00  --------------------------------------------------------  IN: 2000 mL / OUT: 3100 mL / NET: -1100 mL    06 Mar 2018 07:01  -  06 Mar 2018 11:06  --------------------------------------------------------  IN: 0 mL / OUT: 300 mL / NET: -300 mL    Jeferson Devries MD  Internal Medicine PGY-1  Pager: [phone]/ [facility] [phone]  After 7 PM on weekdays and 12 PM on weekends page [phone]
R3 Postpartum Progress Note: P0D 2    Patient seen and examined at bedside, no acute overnight events. Pain well controlled. OOB. Tolerating PO. Voiding. Not lightheaded, dizzy, weak, no chest pain or SOB .  No calf tenderness.    Patient c/o increased swelling of LLE, improved with elevation. Denies pain, parasthesias, coolness, numbness, weakness.     ICU Vital Signs Last 24 Hrs  T(C): 36.7 (08 Mar 2018 05:36), Max: 36.9 (08 Mar 2018 00:55)  T(F): 98.1 (08 Mar 2018 05:36), Max: 98.4 (08 Mar 2018 00:55)  HR: 79 (08 Mar 2018 05:36) (79 - 97)  BP: 111/75 (08 Mar 2018 05:36) (102/71 - 128/73)  RR: 18 (08 Mar 2018 05:36) (18 - 18)  SpO2: 99% (08 Mar 2018 05:36) (97% - 99%)      Physical Exam:  General: NAD  Abdomen: Soft, non-tender, non-distended, fundus firm  Incision: Pfannenstiel incision CDI, subcuticular suture closure  Pelvic: Lochia wnl  Extr: LLE with normal range of motion strength, pain sensation, NT, and warm      Labs:    Blood Type: O Positive  Antibody Screen: Negative  Rubella IgG: Positive (Positive=Immune, Negative=Non-Immune)  RPR: Negative                          10.5   13.1  )-----------( 224      ( 06 Mar 2018 08:50 )             31.4                12.9   9.6   )-----------( 268      ( 03-05 @ 12:19 )             38.0         MEDICATIONS  (STANDING):  acetaminophen   Tablet. 975 milliGRAM(s) Oral every 6 hours  diphtheria/tetanus/pertussis (acellular) Vaccine (ADAcel) 0.5 milliLiter(s) IntraMuscular once  enoxaparin Injectable 100 milliGRAM(s) SubCutaneous every 12 hours  ferrous    sulfate 325 milliGRAM(s) Oral daily  ibuprofen  Tablet 600 milliGRAM(s) Oral every 6 hours  oxyCODONE    IR 5 milliGRAM(s) Oral every 3 hours  prenatal multivitamin 1 Tablet(s) Oral daily    MEDICATIONS  (PRN):  diphenhydrAMINE   Capsule 25 milliGRAM(s) Oral every 6 hours PRN Itching  docusate sodium 100 milliGRAM(s) Oral two times a day PRN Stool Softening  glycerin Suppository - Adult 1 Suppository(s) Rectal at bedtime PRN Constipation  lanolin Ointment 1 Application(s) Topical every 3 hours PRN Sore Nipples  oxyCODONE    IR 5 milliGRAM(s) Oral every 4 hours PRN Severe Pain (7 - 10)  simethicone 80 milliGRAM(s) Chew every 4 hours PRN Gas

## 2018-03-08 NOTE — DISCHARGE NOTE OB - PATIENT PORTAL LINK FT
You can access the [facility] Patient Portal, offered by [facility], by registering with the following website: [email or web address]

## 2018-03-08 NOTE — DISCHARGE NOTE OB - MEDICATION SUMMARY - MEDICATIONS TO TAKE
I will START or STAY ON the medications listed below when I get home from the hospital:    acetaminophen 325 mg oral tablet  -- 3 tab(s) by mouth every 6 hours, As needed, Moderate Pain (4 - 6)  -- Indication: For tylenol    ibuprofen 600 mg oral tablet  -- 1 tab(s) by mouth every 6 hours  -- Indication: For pain    Lovenox 100 mg/mL injectable solution  -- 100 milligram(s) subcutaneously every 12 hours  , 6a and 6p   -- It is very important that you take or use this exactly as directed.  Do not skip doses or discontinue unless directed by your doctor.    -- Indication: For DVT (deep vein thrombosis) in pregnancy I will START or STAY ON the medications listed below when I get home from the hospital:    acetaminophen 325 mg oral tablet  -- 3 tab(s) by mouth every 6 hours, As needed, Moderate Pain (4 - 6)  -- Indication: For tylenol    ibuprofen 600 mg oral tablet  -- 1 tab(s) by mouth every 6 hours  -- Indication: For pain    oxyCODONE 5 mg oral tablet  -- 1 tab(s) by mouth every 6 hours, As Needed -for severe pain MDD:4   -- Caution federal law prohibits the transfer of this drug to any person other  than the person for whom it was prescribed.  It is very important that you take or use this exactly as directed.  Do not skip doses or discontinue unless directed by your doctor.  May cause drowsiness.  Alcohol may intensify this effect.  Use care when operating dangerous machinery.  This prescription cannot be refilled.  Using more of this medication than prescribed may cause serious breathing problems.    -- Indication: For pain    Lovenox 100 mg/mL injectable solution  -- 100 milligram(s) subcutaneously every 12 hours  , 6a and 6p   -- It is very important that you take or use this exactly as directed.  Do not skip doses or discontinue unless directed by your doctor.    -- Indication: For DVT (deep vein thrombosis) in pregnancy

## 2018-03-08 NOTE — DISCHARGE NOTE OB - HOSPITAL COURSE
28yo  s/p pLTCS for breech presentation with EBL 1400. She was on heparain gtt prior to . She was transitioned back to Lovenox 100 BID during her postpartum course. She remained stable and met all postpartum milestones. She was discharged on POD#3 in stable condition.

## 2018-03-08 NOTE — DISCHARGE NOTE OB - CARE PLAN
Principal Discharge DX:	 delivery delivered  Goal:	baseline health  Assessment and plan of treatment:	regular diet, ambulating, pain  Secondary Diagnosis:	DVT (deep vein thrombosis) in pregnancy  Goal:	continue lovenox

## 2018-03-08 NOTE — PROGRESS NOTE ADULT - ATTENDING COMMENTS
Patient seen and examined by me.  Agree with above resident note.
Patient seen and examined by me.  Agree with above resident note.
Patient stable on heparin gtt with no significant bleeding.  Can transition to lovenox at 1 mg/kg BID, to start 2 hours after stopping heparin gtt.  She can follow up with me as an outpatient.
Patient seen and examined  Agree with assessment above

## 2018-03-08 NOTE — PROGRESS NOTE ADULT - ASSESSMENT
26 yo  POD#3 s/p pLTCS for breech presentation, PNC c/b LLE DVT currently on therapeutic Lovenox.  Stable, no signs of bleeding or clot     Problem/Plan - 1:  ·  Problem:  delivery delivered.  Plan: - PO pain meds   - Increase ambulation  - Advance to regular diet as tolerated     Problem/Plan - 2:  ·  Problem: DVT (deep vein thrombosis) in pregnancy.  Plan: - Cont Lovnox 100 BID   - Hematology following appreciate reccomendations  - No SCDs due to lower extremity DVT  -No signs of compartment syndrome at this time    Dipak ARROYO
26 yo  POD#2 s/p pLTCS for breech presentation, PNC c/b LLE DVT currently on therapeutic Lovenox.  Stable, no signs of bleeding or clot     Problem/Plan - 1:  ·  Problem:  delivery delivered.  Plan: - PO pain meds   - Increase ambulation, HSQ  - Advance to regular diet as tolerated     Problem/Plan - 2:  ·  Problem: DVT (deep vein thrombosis) in pregnancy.  Plan: - Cont Lovnox 110 BID   - Hematology following appreciate reccomendations  - No SCDs due to lower extremity DVT    Tardieu R3
26yo  @ 38 weeks gestation with new left-sided DVT (common femoral, femoral, greater saphenous) diagnosed at 34 weeks, now s/p C section, evaluate for peripartum management of DVT.
28 yo  POD#1 s/p pLTCS for breech presentation, PNC c/b LLE DVT currently on HSQ gtt.  Stable, no signs of bleeding or clot

## 2018-03-08 NOTE — DISCHARGE NOTE OB - CARE PROVIDER_API CALL
Barbara Hernandez (MD), Internal Medicine  [address] [place] [address]  Phone: [phone]  Fax: [phone]    [facility],   Phone: (   )    -  Fax: (   )    -

## 2018-03-12 ENCOUNTER — OUTPATIENT (OUTPATIENT)
Dept: OUTPATIENT SERVICES | Facility: HOSPITAL | Age: 28
LOS: 1 days | End: 2018-03-12
Payer: MEDICAID

## 2018-03-12 ENCOUNTER — APPOINTMENT (OUTPATIENT)
Dept: MATERNAL FETAL MEDICINE | Facility: CLINIC | Age: 28
End: 2018-03-12
Payer: MEDICAID

## 2018-03-12 VITALS
WEIGHT: 222 LBS | HEIGHT: 72 IN | SYSTOLIC BLOOD PRESSURE: 112 MMHG | BODY MASS INDEX: 30.07 KG/M2 | DIASTOLIC BLOOD PRESSURE: 70 MMHG

## 2018-03-12 VITALS — TEMPERATURE: 98.1 F

## 2018-03-12 DIAGNOSIS — K59.00 DISEASES OF THE DIGESTIVE SYSTEM COMPLICATING PREGNANCY, THIRD TRIMESTER: ICD-10-CM

## 2018-03-12 DIAGNOSIS — O22.33 DEEP PHLEBOTHROMBOSIS IN PREGNANCY, THIRD TRIMESTER: ICD-10-CM

## 2018-03-12 DIAGNOSIS — O99.613 DISEASES OF THE DIGESTIVE SYSTEM COMPLICATING PREGNANCY, THIRD TRIMESTER: ICD-10-CM

## 2018-03-12 DIAGNOSIS — K59.00 CONSTIPATION, UNSPECIFIED: ICD-10-CM

## 2018-03-12 DIAGNOSIS — O09.90 SUPERVISION OF HIGH RISK PREGNANCY, UNSPECIFIED, UNSPECIFIED TRIMESTER: ICD-10-CM

## 2018-03-12 LAB — SURGICAL PATHOLOGY STUDY: SIGNIFICANT CHANGE UP

## 2018-03-12 PROCEDURE — 99213 OFFICE O/P EST LOW 20 MIN: CPT

## 2018-03-12 PROCEDURE — G0463: CPT

## 2018-03-14 ENCOUNTER — OUTPATIENT (OUTPATIENT)
Dept: OUTPATIENT SERVICES | Facility: HOSPITAL | Age: 28
LOS: 1 days | Discharge: HOME | End: 2018-03-14

## 2018-03-14 ENCOUNTER — EMERGENCY (EMERGENCY)
Facility: HOSPITAL | Age: 28
LOS: 1 days | Discharge: HOME | End: 2018-03-14
Attending: EMERGENCY MEDICINE | Admitting: EMERGENCY MEDICINE
Payer: MEDICAID

## 2018-03-14 ENCOUNTER — TRANSCRIPTION ENCOUNTER (OUTPATIENT)
Age: 28
End: 2018-03-14

## 2018-03-14 VITALS
DIASTOLIC BLOOD PRESSURE: 84 MMHG | TEMPERATURE: 98 F | OXYGEN SATURATION: 98 % | SYSTOLIC BLOOD PRESSURE: 119 MMHG | WEIGHT: 227.96 LBS | RESPIRATION RATE: 18 BRPM | HEIGHT: 72 IN | HEART RATE: 97 BPM

## 2018-03-14 VITALS
DIASTOLIC BLOOD PRESSURE: 73 MMHG | SYSTOLIC BLOOD PRESSURE: 110 MMHG | OXYGEN SATURATION: 100 % | RESPIRATION RATE: 18 BRPM | HEART RATE: 91 BPM | TEMPERATURE: 98 F

## 2018-03-14 DIAGNOSIS — Z98.891 HISTORY OF UTERINE SCAR FROM PREVIOUS SURGERY: ICD-10-CM

## 2018-03-14 DIAGNOSIS — I82.A22 CHRONIC EMBOLISM AND THROMBOSIS OF LEFT AXILLARY VEIN: ICD-10-CM

## 2018-03-14 LAB
ALBUMIN SERPL ELPH-MCNC: 3.6 G/DL — SIGNIFICANT CHANGE UP (ref 3.3–5)
ALP SERPL-CCNC: 95 U/L — SIGNIFICANT CHANGE UP (ref 40–120)
ALT FLD-CCNC: 34 U/L RC — SIGNIFICANT CHANGE UP (ref 10–45)
ANION GAP SERPL CALC-SCNC: 14 MMOL/L — SIGNIFICANT CHANGE UP (ref 5–17)
APPEARANCE UR: CLEAR — SIGNIFICANT CHANGE UP
APTT BLD: 51.5 SEC — HIGH (ref 27.5–37.4)
AST SERPL-CCNC: 24 U/L — SIGNIFICANT CHANGE UP (ref 10–40)
BACTERIA # UR AUTO: ABNORMAL /HPF
BASOPHILS # BLD AUTO: 0 K/UL — SIGNIFICANT CHANGE UP (ref 0–0.2)
BASOPHILS NFR BLD AUTO: 0.3 % — SIGNIFICANT CHANGE UP (ref 0–2)
BILIRUB SERPL-MCNC: 0.3 MG/DL — SIGNIFICANT CHANGE UP (ref 0.2–1.2)
BILIRUB UR-MCNC: NEGATIVE — SIGNIFICANT CHANGE UP
BUN SERPL-MCNC: 15 MG/DL — SIGNIFICANT CHANGE UP (ref 7–23)
CALCIUM SERPL-MCNC: 9.4 MG/DL — SIGNIFICANT CHANGE UP (ref 8.4–10.5)
CHLORIDE SERPL-SCNC: 104 MMOL/L — SIGNIFICANT CHANGE UP (ref 96–108)
CO2 SERPL-SCNC: 21 MMOL/L — LOW (ref 22–31)
COLOR SPEC: YELLOW — SIGNIFICANT CHANGE UP
CREAT SERPL-MCNC: 0.61 MG/DL — SIGNIFICANT CHANGE UP (ref 0.5–1.3)
DIFF PNL FLD: ABNORMAL
EOSINOPHIL # BLD AUTO: 0.1 K/UL — SIGNIFICANT CHANGE UP (ref 0–0.5)
EOSINOPHIL NFR BLD AUTO: 0.6 % — SIGNIFICANT CHANGE UP (ref 0–6)
EPI CELLS # UR: SIGNIFICANT CHANGE UP /HPF
GLUCOSE SERPL-MCNC: 102 MG/DL — HIGH (ref 70–99)
GLUCOSE UR QL: NEGATIVE — SIGNIFICANT CHANGE UP
HCT VFR BLD CALC: 29.6 % — LOW (ref 34.5–45)
HGB BLD-MCNC: 10.4 G/DL — LOW (ref 11.5–15.5)
INR BLD: 1.02 RATIO — SIGNIFICANT CHANGE UP (ref 0.88–1.16)
KETONES UR-MCNC: ABNORMAL
LACTATE BLDV-MCNC: 1.4 MMOL/L — SIGNIFICANT CHANGE UP (ref 0.7–2)
LEUKOCYTE ESTERASE UR-ACNC: ABNORMAL
LIDOCAIN IGE QN: 30 U/L — SIGNIFICANT CHANGE UP (ref 7–60)
LYMPHOCYTES # BLD AUTO: 1 K/UL — SIGNIFICANT CHANGE UP (ref 1–3.3)
LYMPHOCYTES # BLD AUTO: 8.8 % — LOW (ref 13–44)
MAGNESIUM SERPL-MCNC: 1.6 MG/DL — SIGNIFICANT CHANGE UP (ref 1.6–2.6)
MCHC RBC-ENTMCNC: 27.8 PG — SIGNIFICANT CHANGE UP (ref 27–34)
MCHC RBC-ENTMCNC: 35 GM/DL — SIGNIFICANT CHANGE UP (ref 32–36)
MCV RBC AUTO: 79.4 FL — LOW (ref 80–100)
MONOCYTES # BLD AUTO: 0.4 K/UL — SIGNIFICANT CHANGE UP (ref 0–0.9)
MONOCYTES NFR BLD AUTO: 3.3 % — SIGNIFICANT CHANGE UP (ref 2–14)
NEUTROPHILS # BLD AUTO: 9.8 K/UL — HIGH (ref 1.8–7.4)
NEUTROPHILS NFR BLD AUTO: 87 % — HIGH (ref 43–77)
NITRITE UR-MCNC: NEGATIVE — SIGNIFICANT CHANGE UP
PH UR: 6 — SIGNIFICANT CHANGE UP (ref 5–8)
PLATELET # BLD AUTO: 405 K/UL — HIGH (ref 150–400)
POTASSIUM SERPL-MCNC: 4.4 MMOL/L — SIGNIFICANT CHANGE UP (ref 3.5–5.3)
POTASSIUM SERPL-SCNC: 4.4 MMOL/L — SIGNIFICANT CHANGE UP (ref 3.5–5.3)
PROT SERPL-MCNC: 7.4 G/DL — SIGNIFICANT CHANGE UP (ref 6–8.3)
PROT UR-MCNC: SIGNIFICANT CHANGE UP
PROTHROM AB SERPL-ACNC: 11.1 SEC — SIGNIFICANT CHANGE UP (ref 9.8–12.7)
RBC # BLD: 3.73 M/UL — LOW (ref 3.8–5.2)
RBC # FLD: 15.6 % — HIGH (ref 10.3–14.5)
RBC CASTS # UR COMP ASSIST: >50 /HPF (ref 0–2)
SODIUM SERPL-SCNC: 139 MMOL/L — SIGNIFICANT CHANGE UP (ref 135–145)
SP GR SPEC: 1.02 — SIGNIFICANT CHANGE UP (ref 1.01–1.02)
UROBILINOGEN FLD QL: NEGATIVE — SIGNIFICANT CHANGE UP
WBC # BLD: 11.3 K/UL — HIGH (ref 3.8–10.5)
WBC # FLD AUTO: 11.3 K/UL — HIGH (ref 3.8–10.5)
WBC UR QL: ABNORMAL /HPF (ref 0–5)

## 2018-03-14 PROCEDURE — 85027 COMPLETE CBC AUTOMATED: CPT

## 2018-03-14 PROCEDURE — 80053 COMPREHEN METABOLIC PANEL: CPT

## 2018-03-14 PROCEDURE — 99284 EMERGENCY DEPT VISIT MOD MDM: CPT

## 2018-03-14 PROCEDURE — 99284 EMERGENCY DEPT VISIT MOD MDM: CPT | Mod: 25

## 2018-03-14 PROCEDURE — 85730 THROMBOPLASTIN TIME PARTIAL: CPT

## 2018-03-14 PROCEDURE — 83605 ASSAY OF LACTIC ACID: CPT

## 2018-03-14 PROCEDURE — 96374 THER/PROPH/DIAG INJ IV PUSH: CPT

## 2018-03-14 PROCEDURE — 83690 ASSAY OF LIPASE: CPT

## 2018-03-14 PROCEDURE — 96375 TX/PRO/DX INJ NEW DRUG ADDON: CPT

## 2018-03-14 PROCEDURE — 81001 URINALYSIS AUTO W/SCOPE: CPT

## 2018-03-14 PROCEDURE — 85610 PROTHROMBIN TIME: CPT

## 2018-03-14 PROCEDURE — 83735 ASSAY OF MAGNESIUM: CPT

## 2018-03-14 PROCEDURE — 87086 URINE CULTURE/COLONY COUNT: CPT

## 2018-03-14 RX ORDER — KETOROLAC TROMETHAMINE 30 MG/ML
15 SYRINGE (ML) INJECTION ONCE
Qty: 0 | Refills: 0 | Status: DISCONTINUED | OUTPATIENT
Start: 2018-03-14 | End: 2018-03-14

## 2018-03-14 RX ORDER — ACETAMINOPHEN 500 MG
1000 TABLET ORAL ONCE
Qty: 0 | Refills: 0 | Status: COMPLETED | OUTPATIENT
Start: 2018-03-14 | End: 2018-03-14

## 2018-03-14 RX ORDER — ONDANSETRON 8 MG/1
4 TABLET, FILM COATED ORAL ONCE
Qty: 0 | Refills: 0 | Status: DISCONTINUED | OUTPATIENT
Start: 2018-03-14 | End: 2018-03-18

## 2018-03-14 RX ORDER — SODIUM CHLORIDE 9 MG/ML
1000 INJECTION, SOLUTION INTRAVENOUS ONCE
Qty: 0 | Refills: 0 | Status: COMPLETED | OUTPATIENT
Start: 2018-03-14 | End: 2018-03-14

## 2018-03-14 RX ADMIN — Medication 15 MILLIGRAM(S): at 18:58

## 2018-03-14 RX ADMIN — Medication 400 MILLIGRAM(S): at 17:03

## 2018-03-14 RX ADMIN — SODIUM CHLORIDE 2000 MILLILITER(S): 9 INJECTION, SOLUTION INTRAVENOUS at 17:28

## 2018-03-14 NOTE — ED PROVIDER NOTE - PROGRESS NOTE DETAILS
ATTENDING MD Eli: signed out to [facility] awaiting urine, benign exam. If feeling well and tolerating PO stable for DC. UA contaminated, likely related to vag bleeding/lochia. Culture sent. F/u with OB for a recheck. Discussed return precautions.

## 2018-03-14 NOTE — CONSULT NOTE ADULT - PROBLEM SELECTOR RECOMMENDATION 9
-pain control with 600 motrin q6, 975 tylenol q6  -reevaluate after adequate pain control  d[facility] [id number]

## 2018-03-14 NOTE — ED PROVIDER NOTE - OBJECTIVE STATEMENT
Pt is a 28 y/o female w/ a hx of DVT on her left leg and a  surgery x1 week ago presents to the ED w/ intermittent abdominal pain that radiates to back and vaginally. Pain started x5 days ago s/p  surgery. Experiences vomiting in the AM, increase vaginal bleeding. Denies urinary sxs. Pt is currently breastfeeding. OB/GYN is Dr. Nicole Haro. Pt is a 28 y/o female w/ a hx of DVT on her left leg and a  surgery x1 week ago presents to the ED w/ intermittent abdominal pain that radiates to back and vaginally. Pain started x5 days ago s/p  surgery. Experiences vomiting in the AM. Today with mildly increase vaginal bleeding (3 pads today, dark blood, non-foul smelling) no abnormal discharge otherwise). Denies urinary sxs. Pt is currently breastfeeding. OB/GYN is Dr. Nicole Haro.

## 2018-03-14 NOTE — ED PROVIDER NOTE - MEDICAL DECISION MAKING DETAILS
Post-partum abdominal pain, reassuring exam. Seen by OB who also feel exam reassuring. No clear infectious symptoms. Low suspicion for endometritis based on current presentation. Do not suspect septic pelvic emboli and pt on anticoagulation. Otherwise benign exam. If labs reassuring and tolerates PO will DC for close outpatient f/u.

## 2018-03-14 NOTE — ED PROVIDER NOTE - NS ED ROS FT
CONST: no fevers, no chills, no trauma  EYES: no pain, no visual disturbances  ENT: no sore throat, no epistaxis, no rhinorrhea, no hearing changes  CV: no chest pain, no palpitations, no orthopnea, no extremity pain or swelling  RESP: no shortness of breath, no cough, no sputum, no pleurisy, no wheezing  ABD: As per hpi, experiences vomiting and abdominal pain. Denies nausea, no diarrhea, no black or bloody stool  : experience increase vaginal bleeding. Denies dysuria, no hematuria, no frequency, no urgency  MSK: Experiences back pain, no neck pain, no extremity pain  NEURO: no headache, no sensory disturbances, no focal weakness, no dizziness  HEME: no easy bleeding or bruising  SKIN: no diaphoresis, no rash

## 2018-03-14 NOTE — ED PROVIDER NOTE - SHIFT CHANGE DETAILS
Katrina Rodriguez MD - Attending Physician: Pt here with suprapubic discomfort/dysuria. Recent Csection. Cleared by gyn. Awaiting UA for dispo

## 2018-03-14 NOTE — CONSULT NOTE ADULT - ASSESSMENT
27  s/p pLTCS on 3/5/18 with abdominal, but has not taken any pain meds in 12 hours. overall well appearing with benign abdominal exam. would not recommend imaging at this time.

## 2018-03-14 NOTE — ED PROVIDER NOTE - PHYSICAL EXAMINATION
Gen: WNWD NAD  HEENT: NCAT PERRL EOMI normal pharynx  Neck: supple  CV: RRR, no murmur  Lung: CTA BL  : Firm mildly tender uterus no rebound no guarding   Abd: +BS soft NTND, no rebound, no guard. Remainder of abd is benign  pelvis- deferred to OB pad w/ minimal blood, no discharge  Ext: wwp, palp pulses, FROMx4, no cce  SKIN: Surgical wound is clean dry and intact.  Neuro: CN grossly intact, sensation intact, motor 5/5 throughout

## 2018-03-14 NOTE — CONSULT NOTE ADULT - SUBJECTIVE AND OBJECTIVE BOX
OBGYN Consult   27y  s/p pLTCS for breech on 3/5/18 presents with increased abdominal pain during today. She has not taken any pain medication since 4am. Reports mild nausea but tolerated food today. She is passing gas and having bowel movements. She has been wearing the same pad since this morning. Denies foul-smelling discharge. Denies fevers/chills. Denies CP/SOB. She is breastfeeding.         OB/GYN HISTORY: pLTCS as above for breech, lateral fibroid   PAST MEDICAL & SURGICAL HISTORY:  DVT (deep venous thrombosis) diagnosed in pregnancy, found to have APLS  No significant past surgical history    Allergies    No Known Allergies      Home meds: lovenox 100 BID     FAMILY HISTORY:  No pertinent family history in first degree relatives       Vital Signs Last 24 Hrs  T(C): 36.6 (14 Mar 2018 15:00), Max: 36.6 (14 Mar 2018 15:00)  T(F): 97.9 (14 Mar 2018 15:00), Max: 97.9 (14 Mar 2018 15:00)  HR: 97 (14 Mar 2018 15:00) (97 - 97)  BP: 119/84 (14 Mar 2018 15:00) (119/84 - 119/84)  RR: 18 (14 Mar 2018 15:00) (18 - 18)  SpO2: 98% (14 Mar 2018 15:00) (98% - 98%)    PHYSICAL EXAM:      Constitutional: alert and oriented x 3    Respiratory: clear    Cardiovascular: regular rate and rhythm    Gastrointestinal: soft, non tender, non distended, uterus 2cm below umbilicus, firm, nontender    Genitourinary: normal lochia, no foul odor    Incision: pfannenstiel incision c/d/i, no drainage, warmth, erythema, induration     Extremities: NTBL        LABS:                        10.4   11.3  )-----------( 405      ( 14 Mar 2018 16:47 )             29.6     03-14    139  |  104  |  15  ----------------------------<  102<H>  4.4   |  21<L>  |  0.61    Ca    9.4      14 Mar 2018 16:47  Mg     1.6     03-14    TPro  7.4  /  Alb  3.6  /  TBili  0.3  /  DBili  x   /  AST  24  /  ALT  34  /  AlkPhos  95  03-14    PT/INR - ( 14 Mar 2018 16:47 )   PT: 11.1 sec;   INR: 1.02 ratio         PTT - ( 14 Mar 2018 16:47 )  PTT:51.5 sec      Blood Type: O Positive

## 2018-03-14 NOTE — ED ADULT TRIAGE NOTE - CHIEF COMPLAINT QUOTE
mid epigastric pain going to vagina since 5am with nausea and vomiting s/p Csection post partum 5 days ago

## 2018-03-14 NOTE — ED ADULT NURSE NOTE - OBJECTIVE STATEMENT
Pt presents to the Ed with complaint of abdominal pain. Pt AXOX3, skins color normal for age and race. Pt reports abd pain beginning at 5 am this am, pain is diffuse ot lower abdomen, /10 began at 5 am, radiating "to vagina and back". Pt has history of  of first pregnancy on , reports nausea and 1 episode of vomiting at 8 am. Reports two episodes of watery diarrhea this morning, reports taking stool softeners. Breathing unlabored and symmetrical, lung sounds clear, no shortness of breath reported. No chest pain reported. No dysuria or hematuria. Pt reports using " 2-3 pads" with some clots since pregnancy. Pt reports pain in shoulder radiating down arm beginning this am, states "she felt like she slept on it wrong", pain worsening with movement.

## 2018-03-15 ENCOUNTER — RESULT REVIEW (OUTPATIENT)
Age: 28
End: 2018-03-15

## 2018-03-15 ENCOUNTER — APPOINTMENT (OUTPATIENT)
Dept: HEMATOLOGY ONCOLOGY | Facility: CLINIC | Age: 28
End: 2018-03-15

## 2018-03-15 ENCOUNTER — OUTPATIENT (OUTPATIENT)
Dept: OUTPATIENT SERVICES | Facility: HOSPITAL | Age: 28
LOS: 1 days | End: 2018-03-15
Payer: MEDICAID

## 2018-03-15 VITALS
RESPIRATION RATE: 17 BRPM | DIASTOLIC BLOOD PRESSURE: 79 MMHG | SYSTOLIC BLOOD PRESSURE: 119 MMHG | BODY MASS INDEX: 29.86 KG/M2 | HEIGHT: 72 IN | WEIGHT: 220.46 LBS | HEART RATE: 114 BPM | OXYGEN SATURATION: 98 % | TEMPERATURE: 98.2 F

## 2018-03-15 DIAGNOSIS — I82.492 ACUTE EMBOLISM AND THROMBOSIS OF OTHER SPECIFIED DEEP VEIN OF LEFT LOWER EXTREMITY: ICD-10-CM

## 2018-03-15 LAB
BASOPHILS # BLD AUTO: 0.1 K/UL — SIGNIFICANT CHANGE UP (ref 0–0.2)
BASOPHILS NFR BLD AUTO: 0.7 % — SIGNIFICANT CHANGE UP (ref 0–2)
CULTURE RESULTS: SIGNIFICANT CHANGE UP
EOSINOPHIL # BLD AUTO: 0.1 K/UL — SIGNIFICANT CHANGE UP (ref 0–0.5)
EOSINOPHIL NFR BLD AUTO: 1 % — SIGNIFICANT CHANGE UP (ref 0–6)
LYMPHOCYTES # BLD AUTO: 1.4 K/UL — SIGNIFICANT CHANGE UP (ref 1–3.3)
LYMPHOCYTES # BLD AUTO: 18.5 % — SIGNIFICANT CHANGE UP (ref 13–44)
MONOCYTES # BLD AUTO: 0.5 K/UL — SIGNIFICANT CHANGE UP (ref 0–0.9)
MONOCYTES NFR BLD AUTO: 6 % — SIGNIFICANT CHANGE UP (ref 2–14)
NEUTROPHILS # BLD AUTO: 5.6 K/UL — SIGNIFICANT CHANGE UP (ref 1.8–7.4)
NEUTROPHILS NFR BLD AUTO: 73.8 % — SIGNIFICANT CHANGE UP (ref 43–77)
SPECIMEN SOURCE: SIGNIFICANT CHANGE UP

## 2018-03-15 PROCEDURE — 81240 F2 GENE: CPT

## 2018-03-15 PROCEDURE — G0452: CPT | Mod: 26

## 2018-03-15 PROCEDURE — 81241 F5 GENE: CPT

## 2018-03-16 NOTE — ED POST DISCHARGE NOTE - RESULT SUMMARY
Urine Cx: contaminated, spoke w  and explained results. Explained the need to repeat urine Cx with PMD or OBGYN. - Greyson Marx PA-C

## 2018-03-19 ENCOUNTER — APPOINTMENT (OUTPATIENT)
Dept: MATERNAL FETAL MEDICINE | Facility: CLINIC | Age: 28
End: 2018-03-19

## 2018-03-20 LAB
DNA PLOIDY SPEC FC-IMP: SIGNIFICANT CHANGE UP
PTR INTERPRETATION: SIGNIFICANT CHANGE UP

## 2018-04-03 ENCOUNTER — FORM ENCOUNTER (OUTPATIENT)
Age: 28
End: 2018-04-03

## 2018-04-04 ENCOUNTER — APPOINTMENT (OUTPATIENT)
Dept: ULTRASOUND IMAGING | Facility: CLINIC | Age: 28
End: 2018-04-04
Payer: MEDICAID

## 2018-04-04 ENCOUNTER — OUTPATIENT (OUTPATIENT)
Dept: OUTPATIENT SERVICES | Facility: HOSPITAL | Age: 28
LOS: 1 days | End: 2018-04-04

## 2018-04-04 DIAGNOSIS — I82.492 ACUTE EMBOLISM AND THROMBOSIS OF OTHER SPECIFIED DEEP VEIN OF LEFT LOWER EXTREMITY: ICD-10-CM

## 2018-04-04 PROCEDURE — 93971 EXTREMITY STUDY: CPT | Mod: 26,LT

## 2018-04-16 ENCOUNTER — APPOINTMENT (OUTPATIENT)
Dept: MATERNAL FETAL MEDICINE | Facility: CLINIC | Age: 28
End: 2018-04-16

## 2018-04-20 ENCOUNTER — RESULT REVIEW (OUTPATIENT)
Age: 28
End: 2018-04-20

## 2018-05-14 ENCOUNTER — LABORATORY RESULT (OUTPATIENT)
Age: 28
End: 2018-05-14

## 2018-05-14 ENCOUNTER — OUTPATIENT (OUTPATIENT)
Dept: OUTPATIENT SERVICES | Facility: HOSPITAL | Age: 28
LOS: 1 days | End: 2018-05-14
Payer: MEDICAID

## 2018-05-14 ENCOUNTER — APPOINTMENT (OUTPATIENT)
Dept: MATERNAL FETAL MEDICINE | Facility: CLINIC | Age: 28
End: 2018-05-14
Payer: MEDICAID

## 2018-05-14 DIAGNOSIS — O09.899 SUPERVISION OF OTHER HIGH RISK PREGNANCIES, UNSPECIFIED TRIMESTER: ICD-10-CM

## 2018-05-14 PROCEDURE — 36415 COLL VENOUS BLD VENIPUNCTURE: CPT | Mod: NC

## 2018-05-14 PROCEDURE — 99214 OFFICE O/P EST MOD 30 MIN: CPT | Mod: 25

## 2018-05-15 LAB
HCT VFR BLD CALC: 41.1 % — SIGNIFICANT CHANGE UP (ref 34.5–45)
HGB BLD-MCNC: 13.1 G/DL — SIGNIFICANT CHANGE UP (ref 11.5–15.5)
MCHC RBC-ENTMCNC: 25.7 PG — LOW (ref 27–34)
MCHC RBC-ENTMCNC: 31.9 GM/DL — LOW (ref 32–36)
MCV RBC AUTO: 80.6 FL — SIGNIFICANT CHANGE UP (ref 80–100)
PLATELET # BLD AUTO: 311 K/UL — SIGNIFICANT CHANGE UP (ref 150–400)
RBC # BLD: 5.1 M/UL — SIGNIFICANT CHANGE UP (ref 3.8–5.2)
RBC # FLD: 16.6 % — HIGH (ref 10.3–14.5)
WBC # BLD: 5.96 K/UL — SIGNIFICANT CHANGE UP (ref 3.8–10.5)
WBC # FLD AUTO: 5.96 K/UL — SIGNIFICANT CHANGE UP (ref 3.8–10.5)

## 2018-05-15 PROCEDURE — 85027 COMPLETE CBC AUTOMATED: CPT

## 2018-05-15 PROCEDURE — 36415 COLL VENOUS BLD VENIPUNCTURE: CPT

## 2018-05-15 PROCEDURE — G0463: CPT

## 2018-05-22 ENCOUNTER — OUTPATIENT (OUTPATIENT)
Dept: OUTPATIENT SERVICES | Facility: HOSPITAL | Age: 28
LOS: 1 days | Discharge: HOME | End: 2018-05-22

## 2018-05-22 DIAGNOSIS — I82.A22 CHRONIC EMBOLISM AND THROMBOSIS OF LEFT AXILLARY VEIN: ICD-10-CM

## 2018-05-23 PROCEDURE — 86850 RBC ANTIBODY SCREEN: CPT

## 2018-05-23 PROCEDURE — 86762 RUBELLA ANTIBODY: CPT

## 2018-05-23 PROCEDURE — 59050 FETAL MONITOR W/REPORT: CPT

## 2018-05-23 PROCEDURE — 87340 HEPATITIS B SURFACE AG IA: CPT

## 2018-05-23 PROCEDURE — 85730 THROMBOPLASTIN TIME PARTIAL: CPT

## 2018-05-23 PROCEDURE — 80048 BASIC METABOLIC PNL TOTAL CA: CPT

## 2018-05-23 PROCEDURE — 86900 BLOOD TYPING SEROLOGIC ABO: CPT

## 2018-05-23 PROCEDURE — 86901 BLOOD TYPING SEROLOGIC RH(D): CPT

## 2018-05-23 PROCEDURE — 86780 TREPONEMA PALLIDUM: CPT

## 2018-05-23 PROCEDURE — 85027 COMPLETE CBC AUTOMATED: CPT

## 2018-05-23 PROCEDURE — 85610 PROTHROMBIN TIME: CPT

## 2018-05-23 PROCEDURE — 59025 FETAL NON-STRESS TEST: CPT

## 2018-05-24 ENCOUNTER — RESULT REVIEW (OUTPATIENT)
Age: 28
End: 2018-05-24

## 2018-05-24 ENCOUNTER — APPOINTMENT (OUTPATIENT)
Dept: HEMATOLOGY ONCOLOGY | Facility: CLINIC | Age: 28
End: 2018-05-24

## 2018-05-24 VITALS
RESPIRATION RATE: 16 BRPM | WEIGHT: 213.85 LBS | HEART RATE: 75 BPM | BODY MASS INDEX: 29 KG/M2 | OXYGEN SATURATION: 98 % | TEMPERATURE: 98.1 F | SYSTOLIC BLOOD PRESSURE: 115 MMHG | DIASTOLIC BLOOD PRESSURE: 75 MMHG

## 2018-05-24 LAB
BASOPHILS # BLD AUTO: 0 K/UL — SIGNIFICANT CHANGE UP (ref 0–0.2)
BASOPHILS NFR BLD AUTO: 0.8 % — SIGNIFICANT CHANGE UP (ref 0–2)
EOSINOPHIL # BLD AUTO: 0 K/UL — SIGNIFICANT CHANGE UP (ref 0–0.5)
EOSINOPHIL NFR BLD AUTO: 0.6 % — SIGNIFICANT CHANGE UP (ref 0–6)
LYMPHOCYTES # BLD AUTO: 1.5 K/UL — SIGNIFICANT CHANGE UP (ref 1–3.3)
LYMPHOCYTES # BLD AUTO: 33.4 % — SIGNIFICANT CHANGE UP (ref 13–44)
MONOCYTES # BLD AUTO: 0.3 K/UL — SIGNIFICANT CHANGE UP (ref 0–0.9)
MONOCYTES NFR BLD AUTO: 6.3 % — SIGNIFICANT CHANGE UP (ref 2–14)
NEUTROPHILS # BLD AUTO: 2.7 K/UL — SIGNIFICANT CHANGE UP (ref 1.8–7.4)
NEUTROPHILS NFR BLD AUTO: 58.9 % — SIGNIFICANT CHANGE UP (ref 43–77)

## 2018-05-28 ENCOUNTER — FORM ENCOUNTER (OUTPATIENT)
Age: 28
End: 2018-05-28

## 2018-05-29 ENCOUNTER — APPOINTMENT (OUTPATIENT)
Dept: ULTRASOUND IMAGING | Facility: CLINIC | Age: 28
End: 2018-05-29
Payer: MEDICAID

## 2018-05-29 ENCOUNTER — OUTPATIENT (OUTPATIENT)
Dept: OUTPATIENT SERVICES | Facility: HOSPITAL | Age: 28
LOS: 1 days | End: 2018-05-29

## 2018-05-29 DIAGNOSIS — I82.492 ACUTE EMBOLISM AND THROMBOSIS OF OTHER SPECIFIED DEEP VEIN OF LEFT LOWER EXTREMITY: ICD-10-CM

## 2018-05-29 PROCEDURE — 93971 EXTREMITY STUDY: CPT | Mod: 26,LT

## 2018-07-05 NOTE — H&P ADULT - NSHPPOAPULMEMBOLUS_GEN_A_CORE
July 11, 2018      Ghislaine Walls  [address] [address]        Dear ,    We are writing to inform you of your test results.    It looks like you kidney function is stable and your electrolytes are normal.  I think we may have to decrease your thyroid medication.  I am sending you a new prescription of Synthroid 25 mcg.  We should re-check your thyroid levels in about 7-8 weeks.  I will have it ordered for you, so you just need to stop by the lab to get your blood drawn when it is due in 7-8 weeks.    Resulted Orders   Basic metabolic panel  (Ca, Cl, CO2, Creat, Gluc, K, Na, BUN)   Result Value Ref Range    Sodium 142 133 - 144 mmol/L    Potassium 4.8 3.4 - 5.3 mmol/L    Chloride 111 (H) 94 - 109 mmol/L    Carbon Dioxide 24 20 - 32 mmol/L    Anion Gap 7 3 - 14 mmol/L    Glucose 94 70 - 99 mg/dL    Urea Nitrogen 36 (H) 7 - 30 mg/dL    Creatinine 1.50 (H) 0.52 - 1.04 mg/dL    GFR Estimate 33 (L) >60 mL/min/1.7m2      Comment:      Non  GFR Calc    GFR Estimate If Black 40 (L) >60 mL/min/1.7m2      Comment:       GFR Calc    Calcium 9.6 8.5 - 10.1 mg/dL   TSH   Result Value Ref Range    TSH 0.42 0.40 - 4.00 mU/L   T4, free   Result Value Ref Range    T4 Free 1.47 (H) 0.76 - 1.46 ng/dL       If you have any questions or concerns, please call the clinic at the number listed above.       Sincerely,        Mahsa Soto, DO                
no

## 2018-08-03 PROBLEM — I82.409 ACUTE EMBOLISM AND THROMBOSIS OF UNSPECIFIED DEEP VEINS OF UNSPECIFIED LOWER EXTREMITY: Chronic | Status: ACTIVE | Noted: 2018-03-14

## 2018-08-14 ENCOUNTER — OUTPATIENT (OUTPATIENT)
Dept: OUTPATIENT SERVICES | Facility: HOSPITAL | Age: 28
LOS: 1 days | Discharge: HOME | End: 2018-08-14

## 2018-08-14 DIAGNOSIS — I82.A22 CHRONIC EMBOLISM AND THROMBOSIS OF LEFT AXILLARY VEIN: ICD-10-CM

## 2018-08-27 ENCOUNTER — RESULT REVIEW (OUTPATIENT)
Age: 28
End: 2018-08-27

## 2018-08-27 ENCOUNTER — APPOINTMENT (OUTPATIENT)
Dept: HEMATOLOGY ONCOLOGY | Facility: CLINIC | Age: 28
End: 2018-08-27

## 2018-08-27 VITALS
TEMPERATURE: 97.5 F | BODY MASS INDEX: 27.15 KG/M2 | HEART RATE: 82 BPM | WEIGHT: 200.18 LBS | SYSTOLIC BLOOD PRESSURE: 92 MMHG | DIASTOLIC BLOOD PRESSURE: 62 MMHG | RESPIRATION RATE: 16 BRPM | OXYGEN SATURATION: 98 %

## 2018-08-27 LAB
APTT 50/50 2HOUR INCUB: 49.7 SEC — HIGH (ref 27.5–37.4)
APTT 50/50 MIX COMMENT: SIGNIFICANT CHANGE UP
APTT BLD: 51.6 SEC — HIGH (ref 27.5–37.4)
APTT BLD: 64.5 SEC — HIGH (ref 27.5–37.4)
BASOPHILS # BLD AUTO: 0.1 K/UL — SIGNIFICANT CHANGE UP (ref 0–0.2)
BASOPHILS NFR BLD AUTO: 1.4 % — SIGNIFICANT CHANGE UP (ref 0–2)
EOSINOPHIL # BLD AUTO: 0 K/UL — SIGNIFICANT CHANGE UP (ref 0–0.5)
EOSINOPHIL NFR BLD AUTO: 0.7 % — SIGNIFICANT CHANGE UP (ref 0–6)
INR BLD: 1.1 RATIO — SIGNIFICANT CHANGE UP (ref 0.88–1.16)
LYMPHOCYTES # BLD AUTO: 1.5 K/UL — SIGNIFICANT CHANGE UP (ref 1–3.3)
LYMPHOCYTES # BLD AUTO: 30 % — SIGNIFICANT CHANGE UP (ref 13–44)
MONOCYTES # BLD AUTO: 0.3 K/UL — SIGNIFICANT CHANGE UP (ref 0–0.9)
MONOCYTES NFR BLD AUTO: 6.3 % — SIGNIFICANT CHANGE UP (ref 2–14)
NEUTROPHILS # BLD AUTO: 3.1 K/UL — SIGNIFICANT CHANGE UP (ref 1.8–7.4)
NEUTROPHILS NFR BLD AUTO: 61.7 % — SIGNIFICANT CHANGE UP (ref 43–77)
PAT CTL 2H: 48.2 SEC — HIGH (ref 27.5–37.4)
PROTHROM AB SERPL-ACNC: 12.4 SEC — SIGNIFICANT CHANGE UP (ref 10–13.1)
THROMBIN TIME: 27.3 SECS — HIGH (ref 16–25)

## 2018-08-28 LAB
B2 GLYCOPROT1 AB SER QL: POSITIVE
B2 GLYCOPROT1 IGA SER QL: 17.2 SAU — SIGNIFICANT CHANGE UP
B2 GLYCOPROT1 IGG SER-ACNC: 24.2 SGU — HIGH
B2 GLYCOPROT1 IGM SER-ACNC: 5.5 SMU — SIGNIFICANT CHANGE UP
CARDIOLIPIN AB SER-ACNC: POSITIVE
CARDIOLIPIN IGM SER-MCNC: 41.8 GPL — HIGH (ref 0–12.5)
CARDIOLIPIN IGM SER-MCNC: 9.3 MPL — SIGNIFICANT CHANGE UP (ref 0–12.5)
DEPRECATED CARDIOLIPIN IGA SER: <5 APL — SIGNIFICANT CHANGE UP (ref 0–12.5)
DRVVT SCREEN TO CONFIRM RATIO: SIGNIFICANT CHANGE UP
LA NT DPL PPP QL: 36.2 SEC — SIGNIFICANT CHANGE UP
NORMALIZED SCT PPP-RTO: 1.2 RATIO — HIGH (ref 0–1.16)
NORMALIZED SCT PPP-RTO: ABNORMAL

## 2018-09-17 ENCOUNTER — OUTPATIENT (OUTPATIENT)
Dept: OUTPATIENT SERVICES | Facility: HOSPITAL | Age: 28
LOS: 1 days | Discharge: HOME | End: 2018-09-17

## 2018-09-17 DIAGNOSIS — I82.A22 CHRONIC EMBOLISM AND THROMBOSIS OF LEFT AXILLARY VEIN: ICD-10-CM

## 2018-09-24 ENCOUNTER — RESULT REVIEW (OUTPATIENT)
Age: 28
End: 2018-09-24

## 2018-09-24 ENCOUNTER — APPOINTMENT (OUTPATIENT)
Dept: HEMATOLOGY ONCOLOGY | Facility: CLINIC | Age: 28
End: 2018-09-24

## 2018-09-24 VITALS
SYSTOLIC BLOOD PRESSURE: 115 MMHG | DIASTOLIC BLOOD PRESSURE: 68 MMHG | TEMPERATURE: 97.9 F | RESPIRATION RATE: 17 BRPM | OXYGEN SATURATION: 100 % | WEIGHT: 196.21 LBS | BODY MASS INDEX: 26.61 KG/M2 | HEART RATE: 86 BPM

## 2018-12-26 ENCOUNTER — LABORATORY RESULT (OUTPATIENT)
Age: 28
End: 2018-12-26

## 2019-01-02 ENCOUNTER — LABORATORY RESULT (OUTPATIENT)
Age: 29
End: 2019-01-02

## 2019-01-15 ENCOUNTER — LABORATORY RESULT (OUTPATIENT)
Age: 29
End: 2019-01-15

## 2019-02-06 ENCOUNTER — OUTPATIENT (OUTPATIENT)
Dept: OUTPATIENT SERVICES | Facility: HOSPITAL | Age: 29
LOS: 1 days | Discharge: HOME | End: 2019-02-06

## 2019-02-06 DIAGNOSIS — I82.A22 CHRONIC EMBOLISM AND THROMBOSIS OF LEFT AXILLARY VEIN: ICD-10-CM

## 2019-02-14 ENCOUNTER — RESULT REVIEW (OUTPATIENT)
Age: 29
End: 2019-02-14

## 2019-02-14 ENCOUNTER — APPOINTMENT (OUTPATIENT)
Dept: CT IMAGING | Facility: CLINIC | Age: 29
End: 2019-02-14

## 2019-02-14 ENCOUNTER — APPOINTMENT (OUTPATIENT)
Dept: ULTRASOUND IMAGING | Facility: CLINIC | Age: 29
End: 2019-02-14

## 2019-02-14 ENCOUNTER — EMERGENCY (EMERGENCY)
Facility: HOSPITAL | Age: 29
LOS: 1 days | Discharge: HOME | End: 2019-02-14
Attending: EMERGENCY MEDICINE | Admitting: EMERGENCY MEDICINE
Payer: MEDICAID

## 2019-02-14 ENCOUNTER — APPOINTMENT (OUTPATIENT)
Dept: HEMATOLOGY ONCOLOGY | Facility: CLINIC | Age: 29
End: 2019-02-14

## 2019-02-14 VITALS
RESPIRATION RATE: 16 BRPM | HEART RATE: 73 BPM | WEIGHT: 186.07 LBS | OXYGEN SATURATION: 99 % | SYSTOLIC BLOOD PRESSURE: 111 MMHG | TEMPERATURE: 98.1 F | DIASTOLIC BLOOD PRESSURE: 73 MMHG | BODY MASS INDEX: 25.24 KG/M2

## 2019-02-14 VITALS
SYSTOLIC BLOOD PRESSURE: 96 MMHG | HEART RATE: 83 BPM | RESPIRATION RATE: 17 BRPM | TEMPERATURE: 98 F | DIASTOLIC BLOOD PRESSURE: 59 MMHG | OXYGEN SATURATION: 100 %

## 2019-02-14 VITALS
RESPIRATION RATE: 16 BRPM | HEART RATE: 74 BPM | TEMPERATURE: 99 F | DIASTOLIC BLOOD PRESSURE: 73 MMHG | OXYGEN SATURATION: 100 % | SYSTOLIC BLOOD PRESSURE: 115 MMHG

## 2019-02-14 DIAGNOSIS — R06.02 SHORTNESS OF BREATH: ICD-10-CM

## 2019-02-14 LAB
ALBUMIN SERPL ELPH-MCNC: 4.7 G/DL — SIGNIFICANT CHANGE UP (ref 3.3–5)
ALP SERPL-CCNC: 58 U/L — SIGNIFICANT CHANGE UP (ref 40–120)
ALT FLD-CCNC: 15 U/L — SIGNIFICANT CHANGE UP (ref 4–33)
ANION GAP SERPL CALC-SCNC: 11 MMO/L — SIGNIFICANT CHANGE UP (ref 7–14)
APTT BLD: 54.1 SEC — HIGH (ref 27.5–36.3)
AST SERPL-CCNC: 20 U/L — SIGNIFICANT CHANGE UP (ref 4–32)
BASOPHILS # BLD AUTO: 0.02 K/UL — SIGNIFICANT CHANGE UP (ref 0–0.2)
BASOPHILS NFR BLD AUTO: 0.5 % — SIGNIFICANT CHANGE UP (ref 0–2)
BILIRUB SERPL-MCNC: 0.4 MG/DL — SIGNIFICANT CHANGE UP (ref 0.2–1.2)
BUN SERPL-MCNC: 10 MG/DL — SIGNIFICANT CHANGE UP (ref 7–23)
CALCIUM SERPL-MCNC: 9.3 MG/DL — SIGNIFICANT CHANGE UP (ref 8.4–10.5)
CHLORIDE SERPL-SCNC: 102 MMOL/L — SIGNIFICANT CHANGE UP (ref 98–107)
CO2 SERPL-SCNC: 26 MMOL/L — SIGNIFICANT CHANGE UP (ref 22–31)
CREAT SERPL-MCNC: 0.79 MG/DL — SIGNIFICANT CHANGE UP (ref 0.5–1.3)
EOSINOPHIL # BLD AUTO: 0.03 K/UL — SIGNIFICANT CHANGE UP (ref 0–0.5)
EOSINOPHIL NFR BLD AUTO: 0.7 % — SIGNIFICANT CHANGE UP (ref 0–6)
GLUCOSE SERPL-MCNC: 94 MG/DL — SIGNIFICANT CHANGE UP (ref 70–99)
HCG UR-SCNC: NEGATIVE — SIGNIFICANT CHANGE UP
HCT VFR BLD CALC: 36.2 % — SIGNIFICANT CHANGE UP (ref 34.5–45)
HCT VFR BLD CALC: 36.4 % — SIGNIFICANT CHANGE UP (ref 34.5–45)
HGB BLD-MCNC: 11.2 G/DL — LOW (ref 11.5–15.5)
HGB BLD-MCNC: 11.7 G/DL — SIGNIFICANT CHANGE UP (ref 11.5–15.5)
IMM GRANULOCYTES NFR BLD AUTO: 0.2 % — SIGNIFICANT CHANGE UP (ref 0–1.5)
INR BLD: 1.08 — SIGNIFICANT CHANGE UP (ref 0.88–1.17)
LYMPHOCYTES # BLD AUTO: 1.88 K/UL — SIGNIFICANT CHANGE UP (ref 1–3.3)
LYMPHOCYTES # BLD AUTO: 44 % — SIGNIFICANT CHANGE UP (ref 13–44)
MCHC RBC-ENTMCNC: 22.9 PG — LOW (ref 27–34)
MCHC RBC-ENTMCNC: 23.5 PG — LOW (ref 27–34)
MCHC RBC-ENTMCNC: 30.8 % — LOW (ref 32–36)
MCHC RBC-ENTMCNC: 32.4 G/DL — SIGNIFICANT CHANGE UP (ref 32–36)
MCV RBC AUTO: 72.4 FL — LOW (ref 80–100)
MCV RBC AUTO: 74.3 FL — LOW (ref 80–100)
MONOCYTES # BLD AUTO: 0.33 K/UL — SIGNIFICANT CHANGE UP (ref 0–0.9)
MONOCYTES NFR BLD AUTO: 7.7 % — SIGNIFICANT CHANGE UP (ref 2–14)
NEUTROPHILS # BLD AUTO: 2 K/UL — SIGNIFICANT CHANGE UP (ref 1.8–7.4)
NEUTROPHILS NFR BLD AUTO: 46.9 % — SIGNIFICANT CHANGE UP (ref 43–77)
NRBC # FLD: 0 K/UL — LOW (ref 25–125)
NT-PROBNP SERPL-SCNC: 15.75 PG/ML — SIGNIFICANT CHANGE UP
PLATELET # BLD AUTO: 327 K/UL — SIGNIFICANT CHANGE UP (ref 150–400)
PLATELET # BLD AUTO: 335 K/UL — SIGNIFICANT CHANGE UP (ref 150–400)
PMV BLD: 11 FL — SIGNIFICANT CHANGE UP (ref 7–13)
POTASSIUM SERPL-MCNC: 4.3 MMOL/L — SIGNIFICANT CHANGE UP (ref 3.5–5.3)
POTASSIUM SERPL-SCNC: 4.3 MMOL/L — SIGNIFICANT CHANGE UP (ref 3.5–5.3)
PROT SERPL-MCNC: 7.2 G/DL — SIGNIFICANT CHANGE UP (ref 6–8.3)
PROTHROM AB SERPL-ACNC: 12 SEC — SIGNIFICANT CHANGE UP (ref 9.8–13.1)
RBC # BLD: 4.9 M/UL — SIGNIFICANT CHANGE UP (ref 3.8–5.2)
RBC # BLD: 5 M/UL — SIGNIFICANT CHANGE UP (ref 3.8–5.2)
RBC # FLD: 14.5 % — SIGNIFICANT CHANGE UP (ref 10.3–14.5)
RBC # FLD: 16.4 % — HIGH (ref 10.3–14.5)
SODIUM SERPL-SCNC: 139 MMOL/L — SIGNIFICANT CHANGE UP (ref 135–145)
SP GR UR: 1.01 — SIGNIFICANT CHANGE UP (ref 1–1.03)
TROPONIN T, HIGH SENSITIVITY: < 6 NG/L — SIGNIFICANT CHANGE UP (ref ?–14)
WBC # BLD: 4.27 K/UL — SIGNIFICANT CHANGE UP (ref 3.8–10.5)
WBC # BLD: 4.3 K/UL — SIGNIFICANT CHANGE UP (ref 3.8–10.5)
WBC # FLD AUTO: 4.27 K/UL — SIGNIFICANT CHANGE UP (ref 3.8–10.5)
WBC # FLD AUTO: 4.3 K/UL — SIGNIFICANT CHANGE UP (ref 3.8–10.5)

## 2019-02-14 PROCEDURE — 71275 CT ANGIOGRAPHY CHEST: CPT | Mod: 26

## 2019-02-14 PROCEDURE — 71046 X-RAY EXAM CHEST 2 VIEWS: CPT | Mod: 26

## 2019-02-14 PROCEDURE — 99284 EMERGENCY DEPT VISIT MOD MDM: CPT

## 2019-02-14 PROCEDURE — 93971 EXTREMITY STUDY: CPT | Mod: 26,LT

## 2019-02-14 NOTE — ED ADULT NURSE NOTE - CHIEF COMPLAINT QUOTE
pt comes to ED for SOB and referral from MD Yeung. pager [phone]. pt has hx of DVT on blood thinners. pt has increased SOB over the past 5 days. pt went to hematologist and was told to come to ER to r/o dvt or pe. pt is sating 100 % on RA. ekg to be obtained

## 2019-02-14 NOTE — ED PROVIDER NOTE - ATTENDING CONTRIBUTION TO CARE
I agree with the above H&P.  Briefly this is a 28 year old with antiphopholipid syndrome presents with increased sob. sent from [facility] for cta. cta was wnl except for breast nodules which patient was told about. will repeat dvt studies.  doutb acs doubt pna doubt ptx. neha check with ct

## 2019-02-14 NOTE — ED PROVIDER NOTE - PHYSICAL EXAMINATION
Head: NCAT  ENT: Airway patent, moist mucous membranes, nasal passageways clear, no pharyngeal erythema or exudates, uvula midline, no cervical lymphadenopathy  Cardiac: Normal rate, normal rhythm, no murmurs/rubs/gallops appreciated  Respiratory: Lungs CTA B/L  Gastrointestinal: +BS, Abdomen soft, nontender, nondistended, no rebound, no guarding, no organomegaly   MSK: No gross abnormalities, FROM of all four extremities, no edema  HEME: Extremities warm, pulses intact and symmetrical in all four extremities  Skin: No rashes, no lesions  Neuro: No gross neurologic deficits Gen: AAOX3, NAD   Head: NCAT  ENT: Airway patent, moist mucous membranes   Cardiac: Normal rate, normal rhythm, no murmurs/rubs/gallops appreciated  Respiratory: Lungs CTA B/L  Gastrointestinal: +BS, Abdomen soft, nontender, nondistended, no rebound, no guarding  MSK: No gross abnormalities, FROM of all four extremities, no edema, + ttp of the right calf   HEME: Extremities warm, pulses intact and symmetrical in all four extremities  Skin: No rashes, no lesions  Neuro: No gross neurologic deficits

## 2019-02-14 NOTE — ED PROVIDER NOTE - OBJECTIVE STATEMENT
28F hx of provoked DVT on lovenox p/w b/l lower extremity pain with left calf and thigh cramping worse over the last day and sensation of sob and dyspnea on exertion x 2 days. No associated fevers/ chills/ cough/ chest pain/ dizziness. Not sure if she has any palpitations 28F hx of Antiphospholipid syndrome, provoked DVT during pregnancy, on lovenox p/w b/l lower extremity pain with left calf and thigh cramping worse over the last day and sensation of sob and dyspnea on exertion x 2 days. No associated fevers/ chills/ cough/ chest pain/ dizziness. Not sure if she has any palpitations. No travel, no hormonal therapy. Per hematologist Dr Yeung, goal was to transition outpt to coumadin but had not done so yet.

## 2019-02-14 NOTE — ED ADULT NURSE NOTE - OBJECTIVE STATEMENT
Pt received in #15, aaox3 with c/o MAGANA. Pt states that she was referred to the ED by Dr. Yeung with concerns of possible dvt or pe due to the pt's hx of dvt, 2018 and currently on lovenox. Pt states that she had left calf pain almost a week ago prior to the onset of her MAGANA. Pt denies cp, sob at rest, recent travels, smoking hx or use of oral contraceptives. Pt is bradycardic. No increased respiratory effort noted. Pt on CM in SR with continuous pulse oximetry and IV established.

## 2019-02-14 NOTE — ED PROVIDER NOTE - NSFOLLOWUPINSTRUCTIONS_ED_ALL_ED_FT
Follow up with your hematologist in 3 -5 days. Follow up with your primary care doctor regarding a breast ultrasound within 1 month

## 2019-02-14 NOTE — ED PROVIDER NOTE - CLINICAL SUMMARY MEDICAL DECISION MAKING FREE TEXT BOX
28F hx of Antiphospholipid syndrome, provoked DVT during pregnancy, on lovenox p/w left calf pain and dyspnea on exertion - will eval w/ labs, cta, and dvt given pts high risk.

## 2019-02-14 NOTE — ED ADULT TRIAGE NOTE - CHIEF COMPLAINT QUOTE
pt comes to ED for SOB and referral from MD Yeung. pager [phone]. pt has hx of DVT on blood thinners. pt has increased SOB over the past 5 days. pt went to hematologist and was told to come to ER to r/o dvt or pe. pt is sating 100 % on RA. ekg to be obtained

## 2019-02-14 NOTE — ED PROVIDER NOTE - NS ED ROS FT
Gen: No fever, normal appetite  Eyes: No eye irritation or discharge  ENT: No ear pain, congestion, sore throat  Resp: + dyspnea on exertion, no cough  Cardiovascular: No chest pain or palpitation  Gastroenteric: No nausea/vomiting, diarrhea, constipation  :  No change in urine output; no dysuria  MS: + right calf pain   Skin: No rashes  Neuro: No headache; no abnormal movements  Remainder negative, except as per the HPI

## 2019-02-15 NOTE — ASSESSMENT
[FreeTextEntry1] : 29yo F with eft-sided DVT (common femoral, femoral, greater saphenous) at 34 weeks gestation, now s/p C section, + lupus anticoagulant, presents for follow up\par \par SOB- rule out PE, sending her to [facility] ER to get CTA PE \par - she is going to go with her , he will drive her\par -also with left leg pain- check b/l lower extremity u/s doppler to rule out dvt or progression of DVT\par if she has a PE will need bridge to coumadin with possibly argatroban.\par - i spoke with [facility] physician, Dr. Katrina Nettles\par \par \par - Bloodwork done in hospital (2/2018) demonstrated + APLS labs (+LAC, ACL ab, B2GP). Repeat labs 12 weeks apart (5/2018) confirming diagnosis of lupus anticoagulant (vascular thrombosis with positive laboratory criteria met 12 weeks apart)\par - Given this, will need to continue AC, likely lifelong. Optimal AC for treatment of APLS would be coumadin. she has stopped breast feeding\par \par \par Discussed with Dr. Quinones, following longitudinally with Dr. Rodriguez\par

## 2019-02-15 NOTE — PHYSICAL EXAM
[Fully active, able to carry on all pre-disease performance without restriction] : Status 0 - Fully active, able to carry on all pre-disease performance without restriction [Normal] : affect appropriate [de-identified] : no LLE swelling

## 2019-02-15 NOTE — HISTORY OF PRESENT ILLNESS
[de-identified] : 27yo female with LLE DVT and positive lupus anticoagulant presents for follow up visit. \par \par She was hospitalized at 34 wks gestation at [facility] (2018) with LLE swelling and pain, found to have new left-sided DVT (common femoral, femoral, greater saphenous). SHe was started on lovenox injections twice daily. Patient had APL testing-- all reportedly positive. \par  She was transferred to [facility] at approx 36 weeks, as there was concern for underlying May Thurner syndrome. She was evaluated by M who determined that there was no indication for urgent delivery. She was hemodynamically stable and thus thrombectomy vs. thrombolysis was not pursued.\par She underwent  on 3/5/18 due to breech position with no complications. \par \par Her prior doctor, Dr. Niecy Gill continued her lovenox. \par \par I explained to her, after she completed breast feeding, that she should be on coumadin and that we need to bridge her with lovenox to coumadin as it is indicated for lupus anticoagulant. She started taking Coumadin in 2018 and she would forget or not be able to get her INRs checked as scheduled. she said she does not drive and she is with her baby all day, and her  has to drive her to the labs only in the morning before he goes to work. \par  \par \par    [de-identified] : she says over the past 4 days, she has been SOB with exertion and she has pain in her left leg. she has been taking lovenox once a day. \par  she has not taken coumadin in 3 weeks, she was not getting her INR checked as scheduled

## 2019-02-20 RX ORDER — ENOXAPARIN SODIUM 100 MG/ML
100 INJECTION SUBCUTANEOUS
Qty: 100 | Refills: 0 | Status: DISCONTINUED | COMMUNITY
Start: 2019-02-15 | End: 2019-02-20

## 2019-02-23 ENCOUNTER — LABORATORY RESULT (OUTPATIENT)
Age: 29
End: 2019-02-23

## 2019-02-26 ENCOUNTER — LABORATORY RESULT (OUTPATIENT)
Age: 29
End: 2019-02-26

## 2019-02-26 RX ORDER — WARFARIN 7.5 MG/1
7.5 TABLET ORAL DAILY
Qty: 30 | Refills: 1 | Status: ACTIVE | COMMUNITY
Start: 2018-12-27 | End: 1900-01-01

## 2019-03-01 ENCOUNTER — LABORATORY RESULT (OUTPATIENT)
Age: 29
End: 2019-03-01

## 2019-03-05 ENCOUNTER — LABORATORY RESULT (OUTPATIENT)
Age: 29
End: 2019-03-05

## 2019-03-13 ENCOUNTER — LABORATORY RESULT (OUTPATIENT)
Age: 29
End: 2019-03-13

## 2019-03-25 ENCOUNTER — LABORATORY RESULT (OUTPATIENT)
Age: 29
End: 2019-03-25

## 2019-04-04 ENCOUNTER — LABORATORY RESULT (OUTPATIENT)
Age: 29
End: 2019-04-04

## 2019-04-10 ENCOUNTER — LABORATORY RESULT (OUTPATIENT)
Age: 29
End: 2019-04-10

## 2019-04-19 ENCOUNTER — LABORATORY RESULT (OUTPATIENT)
Age: 29
End: 2019-04-19

## 2019-04-25 ENCOUNTER — LABORATORY RESULT (OUTPATIENT)
Age: 29
End: 2019-04-25

## 2019-06-07 PROBLEM — D68.61 ANTIPHOSPHOLIPID SYNDROME: Chronic | Status: ACTIVE | Noted: 2019-02-14

## 2019-06-11 ENCOUNTER — OUTPATIENT (OUTPATIENT)
Dept: OUTPATIENT SERVICES | Facility: HOSPITAL | Age: 29
LOS: 1 days | Discharge: HOME | End: 2019-06-11

## 2019-06-11 DIAGNOSIS — I82.A22 CHRONIC EMBOLISM AND THROMBOSIS OF LEFT AXILLARY VEIN: ICD-10-CM

## 2019-06-13 ENCOUNTER — RESULT REVIEW (OUTPATIENT)
Age: 29
End: 2019-06-13

## 2019-06-13 ENCOUNTER — APPOINTMENT (OUTPATIENT)
Dept: HEMATOLOGY ONCOLOGY | Facility: CLINIC | Age: 29
End: 2019-06-13

## 2019-06-13 VITALS
TEMPERATURE: 98.4 F | SYSTOLIC BLOOD PRESSURE: 113 MMHG | HEART RATE: 98 BPM | RESPIRATION RATE: 16 BRPM | WEIGHT: 192 LBS | BODY MASS INDEX: 26.04 KG/M2 | DIASTOLIC BLOOD PRESSURE: 77 MMHG | OXYGEN SATURATION: 99 %

## 2019-06-13 LAB
ALBUMIN SERPL ELPH-MCNC: 4.4 G/DL — SIGNIFICANT CHANGE UP (ref 3.3–5)
ALP SERPL-CCNC: 62 U/L — SIGNIFICANT CHANGE UP (ref 40–120)
ALT FLD-CCNC: 15 U/L — SIGNIFICANT CHANGE UP (ref 10–45)
ANION GAP SERPL CALC-SCNC: 11 MMOL/L — SIGNIFICANT CHANGE UP (ref 5–17)
APTT BLD: 51.9 SEC — HIGH (ref 27.5–36.3)
AST SERPL-CCNC: 22 U/L — SIGNIFICANT CHANGE UP (ref 10–40)
BASOPHILS # BLD AUTO: 0.1 K/UL — SIGNIFICANT CHANGE UP (ref 0–0.2)
BASOPHILS NFR BLD AUTO: 1.2 % — SIGNIFICANT CHANGE UP (ref 0–2)
BILIRUB SERPL-MCNC: 0.6 MG/DL — SIGNIFICANT CHANGE UP (ref 0.2–1.2)
BUN SERPL-MCNC: 7 MG/DL — SIGNIFICANT CHANGE UP (ref 7–23)
CALCIUM SERPL-MCNC: 9.4 MG/DL — SIGNIFICANT CHANGE UP (ref 8.4–10.5)
CHLORIDE SERPL-SCNC: 106 MMOL/L — SIGNIFICANT CHANGE UP (ref 96–108)
CO2 SERPL-SCNC: 24 MMOL/L — SIGNIFICANT CHANGE UP (ref 22–31)
CREAT SERPL-MCNC: 0.85 MG/DL — SIGNIFICANT CHANGE UP (ref 0.5–1.3)
EOSINOPHIL # BLD AUTO: 0 K/UL — SIGNIFICANT CHANGE UP (ref 0–0.5)
EOSINOPHIL NFR BLD AUTO: 0.7 % — SIGNIFICANT CHANGE UP (ref 0–6)
GLUCOSE SERPL-MCNC: 88 MG/DL — SIGNIFICANT CHANGE UP (ref 70–99)
HCT VFR BLD CALC: 31.3 % — LOW (ref 34.5–45)
HGB BLD-MCNC: 10 G/DL — LOW (ref 11.5–15.5)
INR BLD: 1.61 RATIO — HIGH (ref 0.88–1.16)
LYMPHOCYTES # BLD AUTO: 1.2 K/UL — SIGNIFICANT CHANGE UP (ref 1–3.3)
LYMPHOCYTES # BLD AUTO: 24.1 % — SIGNIFICANT CHANGE UP (ref 13–44)
MCHC RBC-ENTMCNC: 21.2 PG — LOW (ref 27–34)
MCHC RBC-ENTMCNC: 31.9 G/DL — LOW (ref 32–36)
MCV RBC AUTO: 66.3 FL — LOW (ref 80–100)
MONOCYTES # BLD AUTO: 0.3 K/UL — SIGNIFICANT CHANGE UP (ref 0–0.9)
MONOCYTES NFR BLD AUTO: 6.7 % — SIGNIFICANT CHANGE UP (ref 2–14)
NEUTROPHILS # BLD AUTO: 3.4 K/UL — SIGNIFICANT CHANGE UP (ref 1.8–7.4)
NEUTROPHILS NFR BLD AUTO: 67.4 % — SIGNIFICANT CHANGE UP (ref 43–77)
PLATELET # BLD AUTO: 404 K/UL — HIGH (ref 150–400)
POTASSIUM SERPL-MCNC: 4.4 MMOL/L — SIGNIFICANT CHANGE UP (ref 3.5–5.3)
POTASSIUM SERPL-SCNC: 4.4 MMOL/L — SIGNIFICANT CHANGE UP (ref 3.5–5.3)
PROT SERPL-MCNC: 6.7 G/DL — SIGNIFICANT CHANGE UP (ref 6–8.3)
PROTHROM AB SERPL-ACNC: 18.5 SEC — HIGH (ref 10–13.1)
RBC # BLD: 4.72 M/UL — SIGNIFICANT CHANGE UP (ref 3.8–5.2)
RBC # FLD: 14.9 % — HIGH (ref 10.3–14.5)
SODIUM SERPL-SCNC: 141 MMOL/L — SIGNIFICANT CHANGE UP (ref 135–145)
WBC # BLD: 5 K/UL — SIGNIFICANT CHANGE UP (ref 3.8–10.5)
WBC # FLD AUTO: 5 K/UL — SIGNIFICANT CHANGE UP (ref 3.8–10.5)

## 2019-06-14 LAB
B2 GLYCOPROT1 AB SER QL: POSITIVE
B2 GLYCOPROT1 IGA SER QL: 16.2 SAU — SIGNIFICANT CHANGE UP
B2 GLYCOPROT1 IGG SER-ACNC: 31.9 SGU — HIGH
B2 GLYCOPROT1 IGM SER-ACNC: 7.5 SMU — SIGNIFICANT CHANGE UP
CARDIOLIPIN AB SER-ACNC: POSITIVE
CARDIOLIPIN IGM SER-MCNC: 14.2 MPL — HIGH (ref 0–12.5)
DEPRECATED CARDIOLIPIN IGA SER: <5 APL — SIGNIFICANT CHANGE UP (ref 0–12.5)
DRVVT SCREEN TO CONFIRM RATIO: ABNORMAL
LA NT DPL PPP QL: 54.1 SEC — SIGNIFICANT CHANGE UP
NORMALIZED SCT PPP-RTO: 1.35 RATIO — HIGH (ref 0–1.16)
NORMALIZED SCT PPP-RTO: ABNORMAL

## 2019-06-18 LAB — CARDIOLIPIN IGM SER-MCNC: 63.8 GPL — HIGH (ref 0–12.5)

## 2019-06-24 NOTE — PHYSICAL EXAM
[Fully active, able to carry on all pre-disease performance without restriction] : Status 0 - Fully active, able to carry on all pre-disease performance without restriction [Normal] : affect appropriate [de-identified] : no LLE swelling

## 2019-06-24 NOTE — HISTORY OF PRESENT ILLNESS
[de-identified] : 29yo female with LLE DVT and positive lupus anticoagulant, iron deficiency anemia presents for follow up visit. \par \par She was hospitalized at 34 wks gestation at [facility] (2018) with LLE swelling and pain, found to have new left-sided DVT (common femoral, femoral, greater saphenous). She was started on lovenox injections twice daily. Patient had APL testing-- all reportedly positive.  She was transferred to [facility] at approx 36 weeks, as there was concern for underlying May Thurner syndrome. She was evaluated by [facility] who determined that there was no indication for urgent delivery. She was hemodynamically stable and thus thrombectomy vs. thrombolysis was not pursued.She underwent  on 3/5/18 due to breech position with no complications. Her prior doctor, Dr. Niecy Gill continued her lovenox.  I met Meli in Aug 2018 and resumed her care from her prior doctor, Dr. Gill. She started taking Coumadin in 2018 and she would forget or not be able to get her INRs checked as scheduled and explained to her.\par  \par \par    [de-identified] : she presents today for follow up. she is overall doing well. no sob, no leg swelling, no chest pain, no bruising, no coughing up blood, no blood in her urine.\par \par she says her periods are very heavy the first 3 days, she feels dizzy, she thinks she looks pale.  she says she has been taking coumadin 12mg once a day. around may 28th she ate a tessie and then her lips swellled, gums swelled. she went to Dr. Hager the following day she was given an IV , she does not remember the name of the medicine she was taking.\par \par PCP Dr. Hager \par \par Address: [address] [place] [address]\par

## 2019-06-24 NOTE — ASSESSMENT
[Palliative Care Plan] : not applicable at this time [FreeTextEntry1] : 29yo F with left-sided DVT (common femoral, femoral, greater saphenous) at 34 weeks gestation. + lupus anticoagulant, presents for follow up\par \par \par DVT- Bloodwork done in hospital (2/2018) demonstrated + APLS labs (+LAC, ACL ab, B2GP). Repeat labs 12 weeks apart (5/2018) confirming diagnosis of lupus anticoagulant (vascular thrombosis with positive laboratory criteria met 12 weeks apart)\par - Given this, will need to continue AC, likely lifelong. Optimal AC for treatment of APLS is  coumadin.\par - we will check her INR today and repeat lupus anticoagulant testing and adjust coumadin as needed. goal INR 2.5-3.5\par - negative FV Leiden and prothrombin gene mutation negative\par - i will discuss her case with longitudinal attending, Dr. Rodriguez\par \par \par Discussed with Dr. Josue, following longitudinally with Dr. Rodriguez\par

## 2019-06-25 ENCOUNTER — LABORATORY RESULT (OUTPATIENT)
Age: 29
End: 2019-06-25

## 2019-06-25 RX ORDER — CHLORHEXIDINE GLUCONATE 4 %
325 (65 FE) LIQUID (ML) TOPICAL 3 TIMES DAILY
Qty: 90 | Refills: 3 | Status: ACTIVE | COMMUNITY
Start: 2019-06-25 | End: 1900-01-01

## 2019-07-01 ENCOUNTER — LABORATORY RESULT (OUTPATIENT)
Age: 29
End: 2019-07-01

## 2019-07-03 RX ORDER — WARFARIN 2 MG/1
2 TABLET ORAL DAILY
Qty: 30 | Refills: 0 | Status: DISCONTINUED | COMMUNITY
Start: 2019-04-04 | End: 2019-07-03

## 2019-07-11 ENCOUNTER — LABORATORY RESULT (OUTPATIENT)
Age: 29
End: 2019-07-11

## 2019-07-11 ENCOUNTER — CHART COPY (OUTPATIENT)
Age: 29
End: 2019-07-11

## 2019-07-15 ENCOUNTER — LABORATORY RESULT (OUTPATIENT)
Age: 29
End: 2019-07-15

## 2019-07-16 ENCOUNTER — MOBILE ON CALL (OUTPATIENT)
Age: 29
End: 2019-07-16

## 2019-07-19 ENCOUNTER — LABORATORY RESULT (OUTPATIENT)
Age: 29
End: 2019-07-19

## 2019-07-21 ENCOUNTER — EMERGENCY (EMERGENCY)
Facility: HOSPITAL | Age: 29
LOS: 1 days | Discharge: HOME | End: 2019-07-21
Attending: STUDENT IN AN ORGANIZED HEALTH CARE EDUCATION/TRAINING PROGRAM | Admitting: STUDENT IN AN ORGANIZED HEALTH CARE EDUCATION/TRAINING PROGRAM
Payer: MEDICAID

## 2019-07-21 ENCOUNTER — MOBILE ON CALL (OUTPATIENT)
Age: 29
End: 2019-07-21

## 2019-07-21 VITALS
SYSTOLIC BLOOD PRESSURE: 130 MMHG | HEART RATE: 66 BPM | TEMPERATURE: 98 F | DIASTOLIC BLOOD PRESSURE: 83 MMHG | OXYGEN SATURATION: 100 % | RESPIRATION RATE: 16 BRPM

## 2019-07-21 VITALS
HEART RATE: 55 BPM | DIASTOLIC BLOOD PRESSURE: 77 MMHG | TEMPERATURE: 98 F | OXYGEN SATURATION: 100 % | RESPIRATION RATE: 18 BRPM | SYSTOLIC BLOOD PRESSURE: 118 MMHG

## 2019-07-21 LAB
ALBUMIN SERPL ELPH-MCNC: 4.3 G/DL — SIGNIFICANT CHANGE UP (ref 3.3–5)
ALP SERPL-CCNC: 69 U/L — SIGNIFICANT CHANGE UP (ref 40–120)
ALT FLD-CCNC: 16 U/L — SIGNIFICANT CHANGE UP (ref 4–33)
ANION GAP SERPL CALC-SCNC: 9 MMO/L — SIGNIFICANT CHANGE UP (ref 7–14)
ANISOCYTOSIS BLD QL: SLIGHT — SIGNIFICANT CHANGE UP
APTT BLD: 59.3 SEC — HIGH (ref 27.5–36.3)
AST SERPL-CCNC: 22 U/L — SIGNIFICANT CHANGE UP (ref 4–32)
BASOPHILS # BLD AUTO: 0.05 K/UL — SIGNIFICANT CHANGE UP (ref 0–0.2)
BASOPHILS NFR BLD AUTO: 0.7 % — SIGNIFICANT CHANGE UP (ref 0–2)
BILIRUB SERPL-MCNC: 0.2 MG/DL — SIGNIFICANT CHANGE UP (ref 0.2–1.2)
BUN SERPL-MCNC: 12 MG/DL — SIGNIFICANT CHANGE UP (ref 7–23)
BURR CELLS BLD QL SMEAR: SLIGHT — SIGNIFICANT CHANGE UP
CALCIUM SERPL-MCNC: 9.9 MG/DL — SIGNIFICANT CHANGE UP (ref 8.4–10.5)
CHLORIDE SERPL-SCNC: 105 MMOL/L — SIGNIFICANT CHANGE UP (ref 98–107)
CO2 SERPL-SCNC: 26 MMOL/L — SIGNIFICANT CHANGE UP (ref 22–31)
CREAT SERPL-MCNC: 0.75 MG/DL — SIGNIFICANT CHANGE UP (ref 0.5–1.3)
DACRYOCYTES BLD QL SMEAR: SLIGHT — SIGNIFICANT CHANGE UP
EOSINOPHIL # BLD AUTO: 0.09 K/UL — SIGNIFICANT CHANGE UP (ref 0–0.5)
EOSINOPHIL NFR BLD AUTO: 1.2 % — SIGNIFICANT CHANGE UP (ref 0–6)
GLUCOSE SERPL-MCNC: 85 MG/DL — SIGNIFICANT CHANGE UP (ref 70–99)
HCT VFR BLD CALC: 39.8 % — SIGNIFICANT CHANGE UP (ref 34.5–45)
HGB BLD-MCNC: 12 G/DL — SIGNIFICANT CHANGE UP (ref 11.5–15.5)
HYPOCHROMIA BLD QL: SLIGHT — SIGNIFICANT CHANGE UP
IMM GRANULOCYTES NFR BLD AUTO: 0.1 % — SIGNIFICANT CHANGE UP (ref 0–1.5)
INR BLD: 2.61 — HIGH (ref 0.88–1.17)
LYMPHOCYTES # BLD AUTO: 2.29 K/UL — SIGNIFICANT CHANGE UP (ref 1–3.3)
LYMPHOCYTES # BLD AUTO: 30.6 % — SIGNIFICANT CHANGE UP (ref 13–44)
MCHC RBC-ENTMCNC: 22.9 PG — LOW (ref 27–34)
MCHC RBC-ENTMCNC: 30.2 % — LOW (ref 32–36)
MCV RBC AUTO: 76.1 FL — LOW (ref 80–100)
MICROCYTES BLD QL: SLIGHT — SIGNIFICANT CHANGE UP
MONOCYTES # BLD AUTO: 0.58 K/UL — SIGNIFICANT CHANGE UP (ref 0–0.9)
MONOCYTES NFR BLD AUTO: 7.8 % — SIGNIFICANT CHANGE UP (ref 2–14)
NEUTROPHILS # BLD AUTO: 4.46 K/UL — SIGNIFICANT CHANGE UP (ref 1.8–7.4)
NEUTROPHILS NFR BLD AUTO: 59.6 % — SIGNIFICANT CHANGE UP (ref 43–77)
NRBC # FLD: 0 K/UL — SIGNIFICANT CHANGE UP (ref 0–0)
OVALOCYTES BLD QL SMEAR: SLIGHT — SIGNIFICANT CHANGE UP
PLATELET # BLD AUTO: 335 K/UL — SIGNIFICANT CHANGE UP (ref 150–400)
PLATELET COUNT - ESTIMATE: NORMAL — SIGNIFICANT CHANGE UP
PMV BLD: 10.4 FL — SIGNIFICANT CHANGE UP (ref 7–13)
POIKILOCYTOSIS BLD QL AUTO: SLIGHT — SIGNIFICANT CHANGE UP
POLYCHROMASIA BLD QL SMEAR: SLIGHT — SIGNIFICANT CHANGE UP
POTASSIUM SERPL-MCNC: 4.1 MMOL/L — SIGNIFICANT CHANGE UP (ref 3.5–5.3)
POTASSIUM SERPL-SCNC: 4.1 MMOL/L — SIGNIFICANT CHANGE UP (ref 3.5–5.3)
PROT SERPL-MCNC: 7.1 G/DL — SIGNIFICANT CHANGE UP (ref 6–8.3)
PROTHROM AB SERPL-ACNC: 30.7 SEC — HIGH (ref 9.8–13.1)
RBC # BLD: 5.23 M/UL — HIGH (ref 3.8–5.2)
RBC # FLD: SIGNIFICANT CHANGE UP % (ref 10.3–14.5)
REVIEW TO FOLLOW: YES — SIGNIFICANT CHANGE UP
SODIUM SERPL-SCNC: 140 MMOL/L — SIGNIFICANT CHANGE UP (ref 135–145)
SPHEROCYTES BLD QL SMEAR: SLIGHT — SIGNIFICANT CHANGE UP
WBC # BLD: 7.48 K/UL — SIGNIFICANT CHANGE UP (ref 3.8–10.5)
WBC # FLD AUTO: 7.48 K/UL — SIGNIFICANT CHANGE UP (ref 3.8–10.5)

## 2019-07-21 PROCEDURE — 70450 CT HEAD/BRAIN W/O DYE: CPT | Mod: 26

## 2019-07-21 PROCEDURE — 93971 EXTREMITY STUDY: CPT | Mod: 26,LT

## 2019-07-21 PROCEDURE — 99284 EMERGENCY DEPT VISIT MOD MDM: CPT

## 2019-07-21 RX ORDER — METOCLOPRAMIDE HCL 10 MG
10 TABLET ORAL ONCE
Refills: 0 | Status: COMPLETED | OUTPATIENT
Start: 2019-07-21 | End: 2019-07-21

## 2019-07-21 RX ORDER — SODIUM CHLORIDE 9 MG/ML
1000 INJECTION INTRAMUSCULAR; INTRAVENOUS; SUBCUTANEOUS ONCE
Refills: 0 | Status: COMPLETED | OUTPATIENT
Start: 2019-07-21 | End: 2019-07-21

## 2019-07-21 RX ORDER — ACETAMINOPHEN 500 MG
975 TABLET ORAL ONCE
Refills: 0 | Status: COMPLETED | OUTPATIENT
Start: 2019-07-21 | End: 2019-07-21

## 2019-07-21 RX ADMIN — Medication 975 MILLIGRAM(S): at 19:46

## 2019-07-21 RX ADMIN — Medication 10 MILLIGRAM(S): at 19:46

## 2019-07-21 RX ADMIN — SODIUM CHLORIDE 1000 MILLILITER(S): 9 INJECTION INTRAMUSCULAR; INTRAVENOUS; SUBCUTANEOUS at 19:46

## 2019-07-21 NOTE — ED PROVIDER NOTE - OBJECTIVE STATEMENT
28yF with antiphospholipid syndrome on warfarin, DVT (left leg), presents with constant frontal headache for 4-5 days and left leg pain and paresthesia of one day duration. No sudden onset of headache. Endorse lateral thigh, calf pain along with foot numbness and tingling but denies fever, changes in vision, nausea, vomiting, abd pain, chest pain, sob.

## 2019-07-21 NOTE — ED PROVIDER NOTE - NSFOLLOWUPCLINICS_GEN_ALL_ED_FT
[facility]  Neurology Epilepsy  [address] [place] [address]  Phone: [phone]  Fax: [phone]  Follow Up Time:

## 2019-07-21 NOTE — ED PROVIDER NOTE - ATTENDING CONTRIBUTION TO CARE
29 yo female with PMH APA on warfarn, h/o DVT left leg presents to ED for evaluation of frontal headache x 4 -5 days. Patient reports she has been having some left leg pain and paresthesia x 1 day. Patient reports the pain is intermittent, improving. Denies any change in temperature or skin color.   Gen: no acute distress, well appearing, awake, alert and oriented x 3  Cardiac: regular rate and rhythm, +S1S2  Pulm: Clear to auscultation bilaterally  Extremity: LLE: no edema, no deformity, warm and well perfused, FROM all extremities, nontender to palpation   MDM  FEmale presents to ED for evaluation of headache and leg pain  - Will check labs, EKG, imaging, medicate, reassess

## 2019-07-21 NOTE — ED ADULT NURSE NOTE - INTERVENTIONS DEFINITIONS
[place] to call system/Non-slip footwear when patient is off stretcher/Physically safe environment: no spills, clutter or unnecessary equipment/Monitor gait and stability/Review medications for side effects contributing to fall risk/Room bathroom lighting operational/Stretcher in lowest position, wheels locked, appropriate side rails in place/Monitor for mental status changes and reorient to person, place, and time/Reinforce activity limits and safety measures with patient and family/Call bell, personal items and telephone within reach/Instruct patient to call for assistance

## 2019-07-21 NOTE — ED PROVIDER NOTE - PHYSICAL EXAMINATION
Gen: AAOx3, non-toxic  Head: NCAT  HEENT: EOMI, oral mucosa moist, normal conjunctiva  Lung: CTAB, no respiratory distress,, speaking in full sentences  CV: RRR, no murmurs, rubs or gallops  Abd: soft, NTND, no guarding, no CVA tenderness  MSK: no visible deformities, strength 5/5 UE/LE b/l  Neuro: No focal sensory or motor deficits, CN 2-12 intact             steady gait  Skin: Warm, well perfused, no rash  Psych: normal affect.   ~Dudley Brown M.D. Resident

## 2019-07-21 NOTE — ED PROVIDER NOTE - NSFOLLOWUPINSTRUCTIONS_ED_ALL_ED_FT
Please follow up with your primary care physician in 24- 48 hours  A copy of your results have been provided to you  A referral to Neurology have been provided to you  You have been prescribed augmentin: Please take as instructed  Please come back if any of the following: Fever, headaches, changes in vision, nausea, vomiting, chest pain, numbness, tingling, weakness or any major concern

## 2019-07-21 NOTE — ED PROVIDER NOTE - CLINICAL SUMMARY MEDICAL DECISION MAKING FREE TEXT BOX
28yF with antiphospholipid syndrome on warfarin, DVT (left leg), presents with constant frontal headache for 4-5 days and left leg pain and paresthesia of one day duration without any focal deficit on exam. Concern for recurrent DVT, low suspicion for stroke vs benign headache, PE. Will evaluate with labs, coags, fluids, headache cocktail, CT head, LLE duplex and reassess

## 2019-07-21 NOTE — ED ADULT NURSE NOTE - OBJECTIVE STATEMENT
Pt presents to ED with left lower calf pain radiating up the leg as well as left foot numbness/tingling. Pt AxOx3, ambulatory. Pt states she has antiphospholipid syndrome with a history of DVT in the left leg on coumadin. Pt states her coumadin levels have been fluctuating and her level on friday was subtherapeutic. Pt denies chest pain, lightheadedness and dizziness. Pt denies shortness of breath at this time but was short of breath earlier today. Pt denies abd pain, n/v/d. Pt denies dysuria and hematuria. Skin clean dry and intact. 20G IVL placed in the L AC. Will continue to monitor.

## 2019-07-21 NOTE — ED PROVIDER NOTE - NS ED ROS FT
GENERAL: No fever or chills, EYES: no change in vision, HEENT: no trouble swallowing or speaking, CARDIAC: no chest pain, PULMONARY: no cough or SOB, GI: no abdominal pain, no nausea, no vomiting, no diarrhea or constipation, : No changes in urination, SKIN: no rashes, NEURO: +headache, L leg/foot numbness and pain MSK: No joint pain ~Dudley Brown M.D. Resident

## 2019-07-21 NOTE — ED ADULT TRIAGE NOTE - CHIEF COMPLAINT QUOTE
C/o numbness to under the left foot and tingling to left leg since yesterday. C/o abdominal cramping that started today and headache x 3-4 days. hx Antiphospholipid clotting disorder, DVT left leg on Warfarin. EMILY MCKEON came to see. pt to be evaluated in main ED.

## 2019-07-21 NOTE — ED PROVIDER NOTE - PROGRESS NOTE DETAILS
Nyla: called to triage to evaluate pt for code stroke. pt c/o 5 days of headache and LLE pain and paresthesias since yesterday. Pt currently has no neuro complaints. 5/5 strength x4 extremities. no facial droop. symptoms began more then 24 hours ago no code stroke called. Nyla: called to triage to evaluate pt for code stroke. pt c/o 5 days of headache and LLE pain and paresthesias that began yesterday. Pt currently has no neuro complaints. 5/5 strength x4 extremities. no facial droop. symptoms began more then 24 hours ago no code stroke called. pt has a h/o antiphospholipid syndrome and has been subtherapeutic. pt signed out to DR Bass for further evaluation and management. Stephanie MCKEON: Patient reassessed, reports relief with headache and paresthesia. Endorse mild leg pain but has significantly subsided compared to before

## 2019-07-25 ENCOUNTER — LABORATORY RESULT (OUTPATIENT)
Age: 29
End: 2019-07-25

## 2019-07-26 ENCOUNTER — RESULT REVIEW (OUTPATIENT)
Age: 29
End: 2019-07-26

## 2019-07-30 ENCOUNTER — LABORATORY RESULT (OUTPATIENT)
Age: 29
End: 2019-07-30

## 2019-09-23 ENCOUNTER — OUTPATIENT (OUTPATIENT)
Dept: OUTPATIENT SERVICES | Facility: HOSPITAL | Age: 29
LOS: 1 days | Discharge: HOME | End: 2019-09-23

## 2019-09-23 DIAGNOSIS — I82.A22 CHRONIC EMBOLISM AND THROMBOSIS OF LEFT AXILLARY VEIN: ICD-10-CM

## 2019-09-24 ENCOUNTER — RESULT REVIEW (OUTPATIENT)
Age: 29
End: 2019-09-24

## 2019-09-24 ENCOUNTER — APPOINTMENT (OUTPATIENT)
Dept: HEMATOLOGY ONCOLOGY | Facility: CLINIC | Age: 29
End: 2019-09-24
Payer: COMMERCIAL

## 2019-09-24 VITALS
OXYGEN SATURATION: 98 % | WEIGHT: 210.76 LBS | HEART RATE: 88 BPM | RESPIRATION RATE: 18 BRPM | TEMPERATURE: 98.3 F | DIASTOLIC BLOOD PRESSURE: 68 MMHG | SYSTOLIC BLOOD PRESSURE: 105 MMHG | BODY MASS INDEX: 28.58 KG/M2

## 2019-09-24 LAB
BASOPHILS # BLD AUTO: 0 K/UL — SIGNIFICANT CHANGE UP (ref 0–0.2)
BASOPHILS NFR BLD AUTO: 0.6 % — SIGNIFICANT CHANGE UP (ref 0–2)
EOSINOPHIL # BLD AUTO: 0.1 K/UL — SIGNIFICANT CHANGE UP (ref 0–0.5)
EOSINOPHIL NFR BLD AUTO: 1.1 % — SIGNIFICANT CHANGE UP (ref 0–6)
HCT VFR BLD CALC: 42.4 % — SIGNIFICANT CHANGE UP (ref 34.5–45)
HGB BLD-MCNC: 14.4 G/DL — SIGNIFICANT CHANGE UP (ref 11.5–15.5)
LYMPHOCYTES # BLD AUTO: 1.6 K/UL — SIGNIFICANT CHANGE UP (ref 1–3.3)
LYMPHOCYTES # BLD AUTO: 25.9 % — SIGNIFICANT CHANGE UP (ref 13–44)
MCHC RBC-ENTMCNC: 27.8 PG — SIGNIFICANT CHANGE UP (ref 27–34)
MCHC RBC-ENTMCNC: 33.9 G/DL — SIGNIFICANT CHANGE UP (ref 32–36)
MCV RBC AUTO: 82.1 FL — SIGNIFICANT CHANGE UP (ref 80–100)
MONOCYTES # BLD AUTO: 0.4 K/UL — SIGNIFICANT CHANGE UP (ref 0–0.9)
MONOCYTES NFR BLD AUTO: 5.8 % — SIGNIFICANT CHANGE UP (ref 2–14)
NEUTROPHILS # BLD AUTO: 4.1 K/UL — SIGNIFICANT CHANGE UP (ref 1.8–7.4)
NEUTROPHILS NFR BLD AUTO: 66.6 % — SIGNIFICANT CHANGE UP (ref 43–77)
PLATELET # BLD AUTO: 259 K/UL — SIGNIFICANT CHANGE UP (ref 150–400)
RBC # BLD: 5.16 M/UL — SIGNIFICANT CHANGE UP (ref 3.8–5.2)
RBC # FLD: 19.1 % — HIGH (ref 10.3–14.5)
WBC # BLD: 6.1 K/UL — SIGNIFICANT CHANGE UP (ref 3.8–10.5)
WBC # FLD AUTO: 6.1 K/UL — SIGNIFICANT CHANGE UP (ref 3.8–10.5)

## 2019-09-24 PROCEDURE — 99214 OFFICE O/P EST MOD 30 MIN: CPT

## 2019-09-24 RX ORDER — IBUPROFEN 600 MG/1
600 TABLET, FILM COATED ORAL 3 TIMES DAILY
Qty: 30 | Refills: 0 | Status: DISCONTINUED | COMMUNITY
Start: 2018-03-12 | End: 2019-09-24

## 2019-09-24 NOTE — ASSESSMENT
[FreeTextEntry1] : 26yo F with no PMH, recently diagnosed left-sided DVT (common femoral, femoral, greater saphenous) at 34 weeks gestation, now s/p C section, presents for follow up.\par \par - Bloodwork done in hospital (2/2018) demonstrated + APLS labs (+LAC, ACL ab, B2GP). Repeat labs 12 weeks apart (5/2018) confirming diagnosis of APLS (vascular thrombosis with positive laboratory criteria met 12 weeks apart)\par - Given this, will need to continue AC,  lifelong. Optimal AC for treatment of APLS would be coumadin. Patient is on Coumadin 12 mg and 14 mg alternating days. No bleeding. Will continue to check INR 1-2 times per month to make sure she is therapeutic. \par \par I also explained to her that if she wants to have another child ; she will need to switch to Lovenox and be monitored closely throughout pregnancy. \par \par - RTC in 2-3 months, PRN sooner\par \par \par

## 2019-09-24 NOTE — HISTORY OF PRESENT ILLNESS
[0 - No Distress] : Distress Level: 0 [de-identified] : 29 yo F with recently diagnosed LLE DVT presents for follow up visit. \par \par She was hospitalized at 34 wks gestation at [facility] (2018) with LLE swelling and pain, found to have new left-sided DVT (common femoral, femoral, greater saphenous). SHe was started on Lovenox injections twice daily. Patient had APL testing-- all reportedly positive. \par  She was transferred to [facility] at approx 36 weeks, as there was concern for underlying May Thurner syndrome. She was evaluated by MFM who determined that there is no indication for urgent delivery. She was hemodynamically stable and thus thrombectomy vs. thrombolysis was not pursued.\par She underwent  on 3/5/18 due to breech position with no complications. \par \par  \par \par    [de-identified] : \par  Reports compliance with Lovenox, no issues. Denies dyspnea, chest pain, palpitations, LE swelling, abdominal pain, n/v/d/c. Patient is on Coumadin 12 and 14 mg alternating days. No bleeding. \par \par No other changes in her medical, surgical, social history since 6/13/19.\par

## 2019-09-24 NOTE — PHYSICAL EXAM
[Fully active, able to carry on all pre-disease performance without restriction] : Status 0 - Fully active, able to carry on all pre-disease performance without restriction [Normal] : normal spine exam without palpable tenderness, no kyphosis or scoliosis [de-identified] : no LLE swelling

## 2019-09-25 LAB
ALBUMIN SERPL ELPH-MCNC: 4.2 G/DL
ALP BLD-CCNC: 73 U/L
ALT SERPL-CCNC: 14 U/L
ANION GAP SERPL CALC-SCNC: 10 MMOL/L
APTT BLD: 56.9 SEC
AST SERPL-CCNC: 17 U/L
BILIRUB SERPL-MCNC: 0.3 MG/DL
BUN SERPL-MCNC: 8 MG/DL
CALCIUM SERPL-MCNC: 9.4 MG/DL
CHLORIDE SERPL-SCNC: 107 MMOL/L
CO2 SERPL-SCNC: 25 MMOL/L
CREAT SERPL-MCNC: 0.78 MG/DL
GLUCOSE SERPL-MCNC: 94 MG/DL
INR PPP: 2.96 RATIO
POTASSIUM SERPL-SCNC: 4.1 MMOL/L
PROT SERPL-MCNC: 6.5 G/DL
PT BLD: 34.6 SEC
SODIUM SERPL-SCNC: 142 MMOL/L

## 2019-12-18 ENCOUNTER — OUTPATIENT (OUTPATIENT)
Dept: OUTPATIENT SERVICES | Facility: HOSPITAL | Age: 29
LOS: 1 days | Discharge: HOME | End: 2019-12-18

## 2019-12-18 ENCOUNTER — APPOINTMENT (OUTPATIENT)
Dept: OBGYN | Facility: CLINIC | Age: 29
End: 2019-12-18
Payer: COMMERCIAL

## 2019-12-18 ENCOUNTER — LABORATORY RESULT (OUTPATIENT)
Age: 29
End: 2019-12-18

## 2019-12-18 ENCOUNTER — OUTPATIENT (OUTPATIENT)
Dept: OUTPATIENT SERVICES | Facility: HOSPITAL | Age: 29
LOS: 1 days | End: 2019-12-18
Payer: COMMERCIAL

## 2019-12-18 ENCOUNTER — RESULT CHARGE (OUTPATIENT)
Age: 29
End: 2019-12-18

## 2019-12-18 VITALS — DIASTOLIC BLOOD PRESSURE: 74 MMHG | SYSTOLIC BLOOD PRESSURE: 124 MMHG | BODY MASS INDEX: 29.16 KG/M2 | WEIGHT: 215 LBS

## 2019-12-18 DIAGNOSIS — N76.0 ACUTE VAGINITIS: ICD-10-CM

## 2019-12-18 DIAGNOSIS — I82.A22 CHRONIC EMBOLISM AND THROMBOSIS OF LEFT AXILLARY VEIN: ICD-10-CM

## 2019-12-18 DIAGNOSIS — Z01.419 ENCOUNTER FOR GYNECOLOGICAL EXAMINATION (GENERAL) (ROUTINE) WITHOUT ABNORMAL FINDINGS: ICD-10-CM

## 2019-12-18 DIAGNOSIS — Z00.00 ENCOUNTER FOR GENERAL ADULT MEDICAL EXAMINATION W/OUT ABNORMAL FINDINGS: ICD-10-CM

## 2019-12-18 LAB
HCG UR QL: NEGATIVE
QUALITY CONTROL: YES

## 2019-12-18 PROCEDURE — 90471 IMMUNIZATION ADMIN: CPT | Mod: NC

## 2019-12-18 PROCEDURE — 87624 HPV HI-RISK TYP POOLED RSLT: CPT

## 2019-12-18 PROCEDURE — 87491 CHLMYD TRACH DNA AMP PROBE: CPT

## 2019-12-18 PROCEDURE — 90649 4VHPV VACCINE 3 DOSE IM: CPT

## 2019-12-18 PROCEDURE — 87591 N.GONORRHOEAE DNA AMP PROB: CPT

## 2019-12-18 PROCEDURE — G0463: CPT

## 2019-12-18 PROCEDURE — 99385 PREV VISIT NEW AGE 18-39: CPT | Mod: NC,GE,25

## 2019-12-18 PROCEDURE — 90471 IMMUNIZATION ADMIN: CPT

## 2019-12-19 LAB
C TRACH RRNA SPEC QL NAA+PROBE: SIGNIFICANT CHANGE UP
HPV HIGH+LOW RISK DNA PNL CVX: SIGNIFICANT CHANGE UP
N GONORRHOEA RRNA SPEC QL NAA+PROBE: SIGNIFICANT CHANGE UP
SPECIMEN SOURCE: SIGNIFICANT CHANGE UP

## 2019-12-21 LAB — CYTOLOGY SPEC DOC CYTO: SIGNIFICANT CHANGE UP

## 2019-12-24 ENCOUNTER — RESULT REVIEW (OUTPATIENT)
Age: 29
End: 2019-12-24

## 2019-12-24 ENCOUNTER — APPOINTMENT (OUTPATIENT)
Dept: HEMATOLOGY ONCOLOGY | Facility: CLINIC | Age: 29
End: 2019-12-24
Payer: COMMERCIAL

## 2019-12-24 VITALS
TEMPERATURE: 98.2 F | DIASTOLIC BLOOD PRESSURE: 70 MMHG | SYSTOLIC BLOOD PRESSURE: 120 MMHG | WEIGHT: 212.75 LBS | HEART RATE: 88 BPM | RESPIRATION RATE: 18 BRPM | BODY MASS INDEX: 28.85 KG/M2 | OXYGEN SATURATION: 99 %

## 2019-12-24 LAB
BASOPHILS # BLD AUTO: 0 K/UL — SIGNIFICANT CHANGE UP (ref 0–0.2)
BASOPHILS NFR BLD AUTO: 0.9 % — SIGNIFICANT CHANGE UP (ref 0–2)
EOSINOPHIL # BLD AUTO: 0 K/UL — SIGNIFICANT CHANGE UP (ref 0–0.5)
EOSINOPHIL NFR BLD AUTO: 0.8 % — SIGNIFICANT CHANGE UP (ref 0–6)
HCT VFR BLD CALC: 37.7 % — SIGNIFICANT CHANGE UP (ref 34.5–45)
HGB BLD-MCNC: 12.7 G/DL — SIGNIFICANT CHANGE UP (ref 11.5–15.5)
LYMPHOCYTES # BLD AUTO: 1.5 K/UL — SIGNIFICANT CHANGE UP (ref 1–3.3)
LYMPHOCYTES # BLD AUTO: 29.5 % — SIGNIFICANT CHANGE UP (ref 13–44)
MCHC RBC-ENTMCNC: 27.6 PG — SIGNIFICANT CHANGE UP (ref 27–34)
MCHC RBC-ENTMCNC: 33.6 G/DL — SIGNIFICANT CHANGE UP (ref 32–36)
MCV RBC AUTO: 82.1 FL — SIGNIFICANT CHANGE UP (ref 80–100)
MONOCYTES # BLD AUTO: 0.3 K/UL — SIGNIFICANT CHANGE UP (ref 0–0.9)
MONOCYTES NFR BLD AUTO: 6.5 % — SIGNIFICANT CHANGE UP (ref 2–14)
NEUTROPHILS # BLD AUTO: 3.1 K/UL — SIGNIFICANT CHANGE UP (ref 1.8–7.4)
NEUTROPHILS NFR BLD AUTO: 62.3 % — SIGNIFICANT CHANGE UP (ref 43–77)
PLATELET # BLD AUTO: 266 K/UL — SIGNIFICANT CHANGE UP (ref 150–400)
RBC # BLD: 4.6 M/UL — SIGNIFICANT CHANGE UP (ref 3.8–5.2)
RBC # FLD: 12.6 % — SIGNIFICANT CHANGE UP (ref 10.3–14.5)
WBC # BLD: 5 K/UL — SIGNIFICANT CHANGE UP (ref 3.8–10.5)
WBC # FLD AUTO: 5 K/UL — SIGNIFICANT CHANGE UP (ref 3.8–10.5)

## 2019-12-24 PROCEDURE — 99214 OFFICE O/P EST MOD 30 MIN: CPT

## 2019-12-24 RX ORDER — ENOXAPARIN SODIUM 150 MG/ML
150 INJECTION SUBCUTANEOUS
Qty: 150 | Refills: 3 | Status: DISCONTINUED | COMMUNITY
Start: 2018-03-02 | End: 2019-12-24

## 2019-12-24 RX ORDER — DOCUSATE SODIUM 100 MG/1
100 CAPSULE ORAL
Qty: 1 | Refills: 2 | Status: DISCONTINUED | COMMUNITY
Start: 2018-03-02 | End: 2019-12-24

## 2019-12-24 RX ORDER — WARFARIN 4 MG/1
4 TABLET ORAL
Qty: 30 | Refills: 6 | Status: ACTIVE | COMMUNITY
Start: 2019-07-03 | End: 1900-01-01

## 2019-12-24 RX ORDER — CHOLECALCIFEROL (VITAMIN D3) 10(400)/ML
160 (50 FE) DROPS ORAL TWICE DAILY
Qty: 60 | Refills: 3 | Status: DISCONTINUED | COMMUNITY
Start: 2018-03-12 | End: 2019-12-24

## 2019-12-24 RX ORDER — DOCUSATE SODIUM 100 MG
100 TABLET ORAL
Qty: 90 | Refills: 3 | Status: DISCONTINUED | COMMUNITY
Start: 2018-03-12 | End: 2019-12-24

## 2019-12-24 NOTE — PHYSICAL EXAM
[Fully active, able to carry on all pre-disease performance without restriction] : Status 0 - Fully active, able to carry on all pre-disease performance without restriction [Normal] : affect appropriate [de-identified] : no LLE swelling

## 2019-12-24 NOTE — ASSESSMENT
[FreeTextEntry1] : 30yo F with no PMH, recently diagnosed left-sided DVT (common femoral, femoral, greater saphenous) at 34 weeks gestation, now s/p C section.  Antiphospholipid syndrome on Coumadin. \par \par - Bloodwork done in hospital (2/2018) demonstrated + APLS labs (+LAC, ACL ab, B2GP). Repeat labs 12 weeks apart (5/2018) confirming diagnosis of APLS (vascular thrombosis with positive laboratory criteria met 12 weeks apart)\par \par  Patient is on Coumadin 12 mg and 14 mg alternating days. No bleeding. Will continue to check INR 1-2 times per month to make sure she is therapeutic. 2.5 to 3.5. \par \par I also explained to her that if she wants to have another child ; she will need to switch to Lovenox and be monitored closely throughout pregnancy. \par \par - RTC in 3 months, PRN sooner\par \par \par

## 2019-12-24 NOTE — REVIEW OF SYSTEMS
[Easy Bruising] : a tendency for easy bruising [Negative] : Allergic/Immunologic [Easy Bleeding] : a tendency for easy bleeding

## 2019-12-26 LAB
ALBUMIN SERPL ELPH-MCNC: 4.3 G/DL
ALP BLD-CCNC: 68 U/L
ALT SERPL-CCNC: 10 U/L
ANION GAP SERPL CALC-SCNC: 10 MMOL/L
APTT BLD: 55.7 SEC
AST SERPL-CCNC: 18 U/L
BILIRUB SERPL-MCNC: 0.3 MG/DL
BUN SERPL-MCNC: 10 MG/DL
CALCIUM SERPL-MCNC: 9 MG/DL
CHLORIDE SERPL-SCNC: 105 MMOL/L
CO2 SERPL-SCNC: 24 MMOL/L
CREAT SERPL-MCNC: 0.79 MG/DL
FERRITIN SERPL-MCNC: 8 NG/ML
GLUCOSE SERPL-MCNC: 86 MG/DL
INR PPP: 2.34 RATIO
IRON SATN MFR SERPL: 4 %
IRON SERPL-MCNC: 18 UG/DL
POTASSIUM SERPL-SCNC: 4.3 MMOL/L
PROT SERPL-MCNC: 6.8 G/DL
PT BLD: 27.1 SEC
SODIUM SERPL-SCNC: 139 MMOL/L
TIBC SERPL-MCNC: 408 UG/DL
UIBC SERPL-MCNC: 390 UG/DL

## 2019-12-30 NOTE — PHYSICAL EXAM
[Alert] : alert [Awake] : awake [Soft] : soft [Normal] : uterus [Labia Majora] : labia major [Pink Rugae] : pink rugae [No Bleeding] : there was no active vaginal bleeding [Pap Obtained] : a Pap smear was performed [Normal Position] : in a normal position [Uterine Adnexae] : were not tender and not enlarged [No Tenderness] : no rectal tenderness [Acute Distress] : no acute distress [Mass] : no breast mass [Nipple Discharge] : no nipple discharge [Axillary LAD] : no axillary lymphadenopathy [Tender] : non tender [Discharge] : had no discharge [Distended] : not distended [Motion Tenderness] : there was no cervical motion tenderness [Tenderness] : nontender [Enlarged ___ wks] : not enlarged

## 2020-01-03 ENCOUNTER — MED ADMIN CHARGE (OUTPATIENT)
Age: 30
End: 2020-01-03

## 2020-01-03 RX ORDER — WARFARIN 5 MG/1
5 TABLET ORAL
Qty: 30 | Refills: 0 | Status: ACTIVE | COMMUNITY
Start: 2018-12-06 | End: 1900-01-01

## 2020-01-08 ENCOUNTER — APPOINTMENT (OUTPATIENT)
Dept: INFUSION THERAPY | Facility: HOSPITAL | Age: 30
End: 2020-01-08

## 2020-01-10 ENCOUNTER — TRANSCRIPTION ENCOUNTER (OUTPATIENT)
Age: 30
End: 2020-01-10

## 2020-01-10 LAB
INR PPP: 4.47
PT BLD: 53.4

## 2020-01-17 ENCOUNTER — APPOINTMENT (OUTPATIENT)
Dept: INFUSION THERAPY | Facility: HOSPITAL | Age: 30
End: 2020-01-17

## 2020-01-17 ENCOUNTER — OTHER (OUTPATIENT)
Age: 30
End: 2020-01-17

## 2020-01-17 ENCOUNTER — OUTPATIENT (OUTPATIENT)
Dept: OUTPATIENT SERVICES | Facility: HOSPITAL | Age: 30
LOS: 1 days | Discharge: HOME | End: 2020-01-17

## 2020-01-17 DIAGNOSIS — I82.A22 CHRONIC EMBOLISM AND THROMBOSIS OF LEFT AXILLARY VEIN: ICD-10-CM

## 2020-01-21 DIAGNOSIS — R11.2 NAUSEA WITH VOMITING, UNSPECIFIED: ICD-10-CM

## 2020-01-24 ENCOUNTER — APPOINTMENT (OUTPATIENT)
Dept: INFUSION THERAPY | Facility: HOSPITAL | Age: 30
End: 2020-01-24

## 2020-01-27 DIAGNOSIS — Z51.11 ENCOUNTER FOR ANTINEOPLASTIC CHEMOTHERAPY: ICD-10-CM

## 2020-01-28 LAB
INR PPP: 2.57
PT BLD: 30.2

## 2020-02-04 LAB
INR PPP: 3.3
PT BLD: 38.7

## 2020-02-10 RX ORDER — WARFARIN 10 MG/1
10 TABLET ORAL DAILY
Qty: 60 | Refills: 2 | Status: ACTIVE | COMMUNITY
Start: 2020-02-10 | End: 1900-01-01

## 2020-03-04 ENCOUNTER — APPOINTMENT (OUTPATIENT)
Dept: OBGYN | Facility: CLINIC | Age: 30
End: 2020-03-04

## 2020-03-17 RX ORDER — WARFARIN 1 MG/1
1 TABLET ORAL DAILY
Qty: 90 | Refills: 3 | Status: ACTIVE | COMMUNITY
Start: 2019-08-15 | End: 1900-01-01

## 2020-03-17 RX ORDER — WARFARIN 2 MG/1
2 TABLET ORAL
Qty: 30 | Refills: 6 | Status: ACTIVE | COMMUNITY
Start: 2019-07-11 | End: 1900-01-01

## 2020-03-17 RX ORDER — WARFARIN 10 MG/1
10 TABLET ORAL
Qty: 30 | Refills: 3 | Status: ACTIVE | COMMUNITY
Start: 2019-02-14 | End: 1900-01-01

## 2020-03-19 ENCOUNTER — OUTPATIENT (OUTPATIENT)
Dept: OUTPATIENT SERVICES | Facility: HOSPITAL | Age: 30
LOS: 1 days | Discharge: HOME | End: 2020-03-19

## 2020-03-19 DIAGNOSIS — I82.A22 CHRONIC EMBOLISM AND THROMBOSIS OF LEFT AXILLARY VEIN: ICD-10-CM

## 2020-03-24 ENCOUNTER — APPOINTMENT (OUTPATIENT)
Dept: HEMATOLOGY ONCOLOGY | Facility: CLINIC | Age: 30
End: 2020-03-24

## 2020-05-27 LAB
ALBUMIN SERPL ELPH-MCNC: 4.1 G/DL
ALP BLD-CCNC: 75 U/L
ALT SERPL-CCNC: 21 U/L
ANION GAP SERPL CALC-SCNC: 10 MMOL/L
APTT BLD: 46.4 SEC
AST SERPL-CCNC: 21 U/L
BILIRUB SERPL-MCNC: 0.4 MG/DL
BUN SERPL-MCNC: 10 MG/DL
CALCIUM SERPL-MCNC: 9.9 MG/DL
CHLORIDE SERPL-SCNC: 103 MMOL/L
CO2 SERPL-SCNC: 27 MMOL/L
CREAT SERPL-MCNC: 0.79 MG/DL
GLUCOSE SERPL-MCNC: 91 MG/DL
INR PPP: 1.03 RATIO
POTASSIUM SERPL-SCNC: 5.1 MMOL/L
PROT SERPL-MCNC: 6.9 G/DL
PT BLD: 11.7 SEC
SODIUM SERPL-SCNC: 140 MMOL/L

## 2020-06-09 ENCOUNTER — OUTPATIENT (OUTPATIENT)
Dept: OUTPATIENT SERVICES | Facility: HOSPITAL | Age: 30
LOS: 1 days | Discharge: HOME | End: 2020-06-09

## 2020-06-09 DIAGNOSIS — I82.A22 CHRONIC EMBOLISM AND THROMBOSIS OF LEFT AXILLARY VEIN: ICD-10-CM

## 2020-06-11 ENCOUNTER — APPOINTMENT (OUTPATIENT)
Dept: HEMATOLOGY ONCOLOGY | Facility: CLINIC | Age: 30
End: 2020-06-11

## 2020-06-16 ENCOUNTER — APPOINTMENT (OUTPATIENT)
Dept: HEMATOLOGY ONCOLOGY | Facility: CLINIC | Age: 30
End: 2020-06-16
Payer: COMMERCIAL

## 2020-06-16 DIAGNOSIS — I82.492 ACUTE EMBOLISM AND THROMBOSIS OF OTHER SPECIFIED DEEP VEIN OF LEFT LOWER EXTREMITY: ICD-10-CM

## 2020-06-16 PROCEDURE — 99214 OFFICE O/P EST MOD 30 MIN: CPT | Mod: 95

## 2020-06-16 NOTE — ASSESSMENT
[FreeTextEntry1] : 28yo F with no PMH, recently diagnosed left-sided DVT (common femoral, femoral, greater saphenous) at 34 weeks gestation, now s/p C section.  Antiphospholipid syndrome on Coumadin. \par \par - Bloodwork done in hospital (2/2018) demonstrated + APLS labs (+LAC, ACL ab, B2GP). Repeat labs 12 weeks apart (5/2018) confirming diagnosis of APLS (vascular thrombosis with positive laboratory criteria met 12 weeks apart)\par \par  Patient is on Coumadin 12 mg and 13 mg alternating days. No bleeding. Will continue to check INR once  per month to make sure she is therapeutic. 2.5 to 3.5. \par \par I also explained to her that if she wants to have another child ; she will need to switch to Lovenox and be monitored closely throughout pregnancy. \par \par This service was provided by using telehealth. The patient was at home and I was at [place]. The patient requested and participated in this encounter. The encounter face to face last 30   minutes coordinating his/her care and counseling.\par \par \par - RTC in 4 months, PRN sooner\par \par \par

## 2020-06-16 NOTE — PHYSICAL EXAM
[Fully active, able to carry on all pre-disease performance without restriction] : Status 0 - Fully active, able to carry on all pre-disease performance without restriction [Normal] : pharynx is unremarkable, moist mucus membrane, no oral lesions [de-identified] : no LLE swelling

## 2020-06-16 NOTE — HISTORY OF PRESENT ILLNESS
[Home] : at home, [unfilled] , at the time of the visit. [Verbal consent obtained from patient] : the patient, [unfilled] [0 - No Distress] : Distress Level: 0 [de-identified] : 30 yo F with recently diagnosed LLE DVT presents for follow up visit. \par \par She was hospitalized at 34 wks gestation at [facility] (2018) with LLE swelling and pain, found to have new left-sided DVT (common femoral, femoral, greater saphenous). SHe was started on Lovenox injections twice daily. Patient had APL testing-- all reportedly positive. \par  She was transferred to [facility] at approx 36 weeks, as there was concern for underlying May Thurner syndrome. She was evaluated by MFM who determined that there is no indication for urgent delivery. She was hemodynamically stable and thus thrombectomy vs. thrombolysis was not pursued.\par She underwent  on 3/5/18 due to breech position with no complications. \par 2019 LA positive, anticardiolipin antibody IgG 63.8 , anticardiolipin antibody IgM 14.2. B 2 glycoprotein IgG 31.9. \par  \par \par    [de-identified] : \par Denies dyspnea, chest pain, palpitations, LE swelling, abdominal pain. Patient is on Coumadin 12 and 13 mg alternating days. No bleeding. INR been checked every month at [facility]. \par \par No other changes in her medical, surgical, social history since 12/24/19.\par

## 2020-06-16 NOTE — REVIEW OF SYSTEMS
[Easy Bleeding] : a tendency for easy bleeding [Easy Bruising] : a tendency for easy bruising [Negative] : Allergic/Immunologic

## 2020-09-02 ENCOUNTER — APPOINTMENT (OUTPATIENT)
Dept: OBGYN | Facility: CLINIC | Age: 30
End: 2020-09-02

## 2020-09-15 ENCOUNTER — APPOINTMENT (OUTPATIENT)
Dept: HEMATOLOGY ONCOLOGY | Facility: CLINIC | Age: 30
End: 2020-09-15

## 2020-09-16 ENCOUNTER — APPOINTMENT (OUTPATIENT)
Dept: OBGYN | Facility: CLINIC | Age: 30
End: 2020-09-16
Payer: COMMERCIAL

## 2020-09-16 VITALS
DIASTOLIC BLOOD PRESSURE: 68 MMHG | TEMPERATURE: 98.6 F | WEIGHT: 200 LBS | RESPIRATION RATE: 16 BRPM | HEIGHT: 72 IN | SYSTOLIC BLOOD PRESSURE: 118 MMHG | BODY MASS INDEX: 27.09 KG/M2

## 2020-09-16 DIAGNOSIS — D50.9 IRON DEFICIENCY ANEMIA, UNSPECIFIED: ICD-10-CM

## 2020-09-16 DIAGNOSIS — D68.61 ANTIPHOSPHOLIPID SYNDROME: ICD-10-CM

## 2020-09-16 DIAGNOSIS — N64.9 DISORDER OF BREAST, UNSPECIFIED: ICD-10-CM

## 2020-09-16 PROCEDURE — 99395 PREV VISIT EST AGE 18-39: CPT

## 2020-09-16 NOTE — HISTORY OF PRESENT ILLNESS
[FreeTextEntry1] : 28 yo  with LMP 20 for gyn annual.\par  pregnancy of 5# female at [facility] ended in c/s for fetal position - Breech. Pregnancy was complicated by LE DVT at 8 months. She was diagnosed at . She used Lovenox 150 mg sub Q for therapeutic treatment in pregnancy.\par Anti-phospholipid syndrome was diagnosed and is currently being treated with Warfarin by Dr Mcfadden, hematologist.  \par Anemia treated by IV iron infusion by hematologist. Patient fully aware of PE risk in subsequent pregnancy.  She chose not to use Mirena IUD which was offered at Post Partum visit. She is currently trying to decide if she wants to conceive again.  would like a son.  [PapSmeardate] : 12/2019

## 2020-09-16 NOTE — DISCUSSION/SUMMARY
[FreeTextEntry1] : Health Maintenance:\par pap with HPV\par TBSE\par \par Contraception:\par -condom use currently\par -Paraguard is option\par -suggest PNV given current contraception\par \par APLS\par -2018 LE DVT in pregnancy\par -successful c/s for breech without complication per patient\par -referral for  consult now to help with difficult decision of whether to conceive again.\par \par Fibrocystic changes in left breast:\par -Rx for breast US suggested in beginning of next cycle\par \par Anemia:\par -IV iron infusions

## 2020-09-16 NOTE — COUNSELING
[Nutrition/ Exercise/ Weight Management] : nutrition, exercise, weight management [Vitamins/Supplements] : vitamins/supplements [Contraception/ Emergency Contraception/ Safe Sexual Practices] : contraception, emergency contraception, safe sexual practices [Preconception Care/ Fertility options] : preconception care, fertility options

## 2020-09-16 NOTE — PHYSICAL EXAM
[Appropriately responsive] : appropriately responsive [Alert] : alert [No Acute Distress] : no acute distress [No Lymphadenopathy] : no lymphadenopathy [Soft] : soft [Non-tender] : non-tender [Non-distended] : non-distended [Oriented x3] : oriented x3 [Breast Nipple Inversion] : inverted [Breast Mass Right Breast ___cm] : no was mass palpable [Labia Majora] : normal [Labia Minora] : normal [Normal] : normal [Uterine Adnexae] : normal [The Right Breast Was Examined] : a normal appearance [Diffuse Fibrous Tissue In The Left Breast] : fibrocystic changes

## 2020-11-05 DIAGNOSIS — N63.0 UNSPECIFIED LUMP IN UNSPECIFIED BREAST: ICD-10-CM

## 2020-11-09 ENCOUNTER — NON-APPOINTMENT (OUTPATIENT)
Age: 30
End: 2020-11-09

## 2021-06-16 NOTE — PATIENT PROFILE OB - NS PRO ABUSE SCREEN SUSPICION NEGLECT YN
Detail Level: Detailed Minocycline Pregnancy And Lactation Text: This medication is Pregnancy Category D and not consider safe during pregnancy. It is also excreted in breast milk. Bactrim Counseling:  I discussed with the patient the risks of sulfa antibiotics including but not limited to GI upset, allergic reaction, drug rash, diarrhea, dizziness, photosensitivity, and yeast infections. Rarely, more serious reactions can occur including but not limited to aplastic anemia, agranulocytosis, methemoglobinemia, blood dyscrasias, liver or kidney failure, lung infiltrates or desquamative/blistering drug rashes. Spironolactone Pregnancy And Lactation Text: This medication can cause feminization of the male fetus and should be avoided during pregnancy. The active metabolite is also found in breast milk. Use Enhanced Medication Counseling?: No Doxycycline Pregnancy And Lactation Text: This medication is Pregnancy Category D and not consider safe during pregnancy. It is also excreted in breast milk but is considered safe for shorter treatment courses. Isotretinoin Pregnancy And Lactation Text: This medication is Pregnancy Category X and is considered extremely dangerous during pregnancy. It is unknown if it is excreted in breast milk. Topical Sulfur Applications Counseling: Topical Sulfur Counseling: Patient counseled that this medication may cause skin irritation or allergic reactions. In the event of skin irritation, the patient was advised to reduce the amount of the drug applied or use it less frequently. The patient verbalized understanding of the proper use and possible adverse effects of topical sulfur application. All of the patient's questions and concerns were addressed. Azithromycin Pregnancy And Lactation Text: This medication is considered safe during pregnancy and is also secreted in breast milk. Birth Control Pills Pregnancy And Lactation Text: This medication should be avoided if pregnant and for the first 30 days post-partum. Tetracycline Counseling: Patient counseled regarding possible photosensitivity and increased risk for sunburn. Patient instructed to avoid sunlight, if possible. When exposed to sunlight, patients should wear protective clothing, sunglasses, and sunscreen. The patient was instructed to call the office immediately if the following severe adverse effects occur:  hearing changes, easy bruising/bleeding, severe headache, or vision changes. The patient verbalized understanding of the proper use and possible adverse effects of tetracycline. All of the patient's questions and concerns were addressed. Patient understands to avoid pregnancy while on therapy due to potential birth defects. Topical Retinoid counseling:  Patient advised to apply a pea-sized amount only at bedtime and wait 30 minutes after washing their face before applying. If too drying, patient may add a non-comedogenic moisturizer. The patient verbalized understanding of the proper use and possible adverse effects of retinoids. All of the patient's questions and concerns were addressed. High Dose Vitamin A Counseling: Side effects reviewed, pt to contact office should one occur. Sarecycline Counseling: Patient advised regarding possible photosensitivity and discoloration of the teeth, skin, lips, tongue and gums. Patient instructed to avoid sunlight, if possible. When exposed to sunlight, patients should wear protective clothing, sunglasses, and sunscreen. The patient was instructed to call the office immediately if the following severe adverse effects occur:  hearing changes, easy bruising/bleeding, severe headache, or vision changes. The patient verbalized understanding of the proper use and possible adverse effects of sarecycline. All of the patient's questions and concerns were addressed. Erythromycin Counseling:  I discussed with the patient the risks of erythromycin including but not limited to GI upset, allergic reaction, drug rash, diarrhea, increase in liver enzymes, and yeast infections. Dapsone Counseling: I discussed with the patient the risks of dapsone including but not limited to hemolytic anemia, agranulocytosis, rashes, methemoglobinemia, kidney failure, peripheral neuropathy, headaches, GI upset, and liver toxicity. Patients who start dapsone require monitoring including baseline LFTs and weekly CBCs for the first month, then every month thereafter. The patient verbalized understanding of the proper use and possible adverse effects of dapsone. All of the patient's questions and concerns were addressed. Benzoyl Peroxide Pregnancy And Lactation Text: This medication is Pregnancy Category C. It is unknown if benzoyl peroxide is excreted in breast milk. Tazorac Pregnancy And Lactation Text: This medication is not safe during pregnancy. It is unknown if this medication is excreted in breast milk. Topical Sulfur Applications Pregnancy And Lactation Text: This medication is Pregnancy Category C and has an unknown safety profile during pregnancy. It is unknown if this topical medication is excreted in breast milk. Azithromycin Counseling:  I discussed with the patient the risks of azithromycin including but not limited to GI upset, allergic reaction, drug rash, diarrhea, and yeast infections. Birth Control Pills Counseling: Birth Control Pill Counseling: I discussed with the patient the potential side effects of OCPs including but not limited to increased risk of stroke, heart attack, thrombophlebitis, deep venous thrombosis, hepatic adenomas, breast changes, GI upset, headaches, and depression. The patient verbalized understanding of the proper use and possible adverse effects of OCPs. All of the patient's questions and concerns were addressed. Detail Level: Zone Erythromycin Pregnancy And Lactation Text: This medication is Pregnancy Category B and is considered safe during pregnancy. It is also excreted in breast milk. Topical Clindamycin Counseling: Patient counseled that this medication may cause skin irritation or allergic reactions. In the event of skin irritation, the patient was advised to reduce the amount of the drug applied or use it less frequently. The patient verbalized understanding of the proper use and possible adverse effects of clindamycin. All of the patient's questions and concerns were addressed. High Dose Vitamin A Pregnancy And Lactation Text: High dose vitamin A therapy is contraindicated during pregnancy and breast feeding. Bactrim Pregnancy And Lactation Text: This medication is Pregnancy Category D and is known to cause fetal risk. It is also excreted in breast milk. Dapsone Pregnancy And Lactation Text: This medication is Pregnancy Category C and is not considered safe during pregnancy or breast feeding. Benzoyl Peroxide Counseling: Patient counseled that medicine may cause skin irritation and bleach clothing. In the event of skin irritation, the patient was advised to reduce the amount of the drug applied or use it less frequently. The patient verbalized understanding of the proper use and possible adverse effects of benzoyl peroxide. All of the patient's questions and concerns were addressed. Tazorac Counseling:  Patient advised that medication is irritating and drying. Patient may need to apply sparingly and wash off after an hour before eventually leaving it on overnight. The patient verbalized understanding of the proper use and possible adverse effects of tazorac. All of the patient's questions and concerns were addressed. Isotretinoin Counseling: Patient should get monthly blood tests, not donate blood, not drive at night if vision affected, not share medication, and not undergo elective surgery for 6 months after tx completed. Side effects reviewed, pt to contact office should one occur. Minocycline Counseling: Patient advised regarding possible photosensitivity and discoloration of the teeth, skin, lips, tongue and gums. Patient instructed to avoid sunlight, if possible. When exposed to sunlight, patients should wear protective clothing, sunglasses, and sunscreen. The patient was instructed to call the office immediately if the following severe adverse effects occur:  hearing changes, easy bruising/bleeding, severe headache, or vision changes. The patient verbalized understanding of the proper use and possible adverse effects of minocycline. All of the patient's questions and concerns were addressed. Spironolactone Counseling: Patient advised regarding risks of diarrhea, abdominal pain, hyperkalemia, birth defects (for female patients), liver toxicity and renal toxicity. The patient may need blood work to monitor liver and kidney function and potassium levels while on therapy. The patient verbalized understanding of the proper use and possible adverse effects of spironolactone. All of the patient's questions and concerns were addressed. Doxycycline Counseling:  Patient counseled regarding possible photosensitivity and increased risk for sunburn. Patient instructed to avoid sunlight, if possible. When exposed to sunlight, patients should wear protective clothing, sunglasses, and sunscreen. The patient was instructed to call the office immediately if the following severe adverse effects occur:  hearing changes, easy bruising/bleeding, severe headache, or vision changes. The patient verbalized understanding of the proper use and possible adverse effects of doxycycline. All of the patient's questions and concerns were addressed. Topical Retinoid Pregnancy And Lactation Text: This medication is Pregnancy Category C. It is unknown if this medication is excreted in breast milk. Topical Clindamycin Pregnancy And Lactation Text: This medication is Pregnancy Category B and is considered safe during pregnancy. It is unknown if it is excreted in breast milk. no

## 2022-03-07 NOTE — PROVIDER CONTACT NOTE (CRITICAL VALUE NOTIFICATION) - ACTION/TREATMENT ORDERED:
no further action is needed at this time since action was taking per protocol
Hold x 1 hour and restart at 15cc/hr
Labs/EKG/Imaging Studies/Medications
Hold x 1 hour and restart at 12cc/hr as per Nonomgram

## 2022-09-14 NOTE — ED ADULT NURSE NOTE - CHIEF COMPLAINT QUOTE
TRANSFER - IN REPORT:    Verbal report received from Christina Dickey RN on Cassandra Mcneill being received from PACU for routine progression of patient care      Report consisted of patients Situation, Background, Assessment and Recommendations(SBAR). Information from the following report(s) Procedure Summary was reviewed with the receiving nurse. Opportunity for questions and clarification was provided. Assessment completed upon patients arrival to unit and care assumed. mid epigastric pain going to vagina since 5am with nausea and vomiting s/p Csection post partum 5 days ago

## 2023-01-19 NOTE — PROVIDER CONTACT NOTE (MEDICATION) - ASSESSMENT
Patient is status post robotic assisted laparoscopic paraesophageal hernia repair without mesh, toupet fundoplication, EGD on January 2.  She was discharged in stable condition the following day.  She denies acid reflux.  She is now on soft foods which she has been tolerating.  However, she did admit to trying chicken recently and she felt as though he had gotten stuck but was able to eventually wash it down.  She reports her dry cough and sore throat has improved.  She no longer has throat clearing.  We discussed that it may take some time for voice changes to reverse.  Patient is down roughly 30 pounds since October.    Incisions are clean and dry.  There is some very mild separation at her incisions and some suture knots that are extruding.  No signs of infection    Follow-up in 2 months  
VS stable.

## 2023-01-23 NOTE — DISCHARGE NOTE ANTEPARTUM - PAIN IN THE CALVES OF YOUR LEGS
"2023       RE: Vernon Ortiz  [address] [facility] [address]     Dear Colleague,    Thank you for referring your patient, Vernon Ortiz, to the [facility] [place] at [facility]. Please see a copy of my visit note below.    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 \"The lower abdominal drain was fed laterally in what had been the pancreatic tail, and the upper drain was fed medially towards the pancreatic head.\" Cultures from that fluid grew Staph Epidermidis and Propionibacterium acnes.     He has had about 50-80 ml a day from both drains, in the last several days the output has become foul smelling and less clear. He denies any fevers, chills, abdominal pain, issues sleeping, changes in appetite etc    LABS/IMAGING/MEDICAL RECORDS REVIEW:    Cultures from that fluid grew Staph Epidermidis and Propionibacterium acnes.     PHYSICAL EXAMINATION:  BP 99/66 (BP Location: Left arm, Patient Position: Sitting, Cuff Size: Adult Large)   Pulse 80   Ht 1.803 m (5' 11\")   Wt 90.2 kg (198 lb 14.4 oz)   SpO2 98%   BMI 27.74 kg/m     Body mass index is 27.74 kg/m .   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 The drain output has changed in character to a milky/brown output, differential includes pancreatic leak, abscess, " ongoing necrotic pancreas debridement via the drain or potential enterotomy etc    Plan for CT scan with IV and oral contrast    Sincerely,    Juanita Dickinson MD  PGY5 General Surgery Resident    Discussed with staff surgeon Dr. Martinez.         Attestation signed by Paulo Martinez MD at 1/25/2023  3:18 PM:  I saw and examined Vernon Ortiz with Dr. Dickinson. I agree with the information in this note and it reflects our shared decision making. Clinically stable without signs of uncontrolled infection.  Turbid fluid out of both drains- with increased volume over past few days. The drain output is foul smelling, not feculent.  My suspicion is this represents breakdown of additional necrotic material.    I did obtain a CT scan and reviewed this after clinic- I find the imaging reassuring and without signs of infection.  There is a risk of fistula which we will monitor for.    Will continue drains for now.  I will call them in about 2 weeks for repeat check.        Sincerely,    Paulo Martinez MD   Statement Selected

## 2023-12-14 NOTE — PATIENT PROFILE OB - NSSUBSTANCEUSE_GEN_ALL_CORE_SD
"HEALTHY BACK TOOLS        KEEP YOUR SPINE FEELING FINE   HEALTHY HABITS   Do your "GO TO" stretches 2/day   Get a good night's REST   Watch your POSTURE in sitting/standing Drink PLENTY of water   Use a lumbar roll Eat LOTS of fruits & vegetables   GET UP often (walk and/or stretch) Manage your STRESS   Make your workplace IDEAL FOR YOU  Don't smoke   Lift correctly EXERCISE   Practice positive self talk-talk to yourself kindly like you would your best friend                         WHAT TO DO WHEN SYMPTOMS FLARE UP  Back and neck pain may occasionally flare up.  If you experience a flare   up, remember your tools. Be encouraged, by remembering that flare-ups will   usually pass.   My Tools:    ~Use your "Go To" Stretches/Positions   ~Keep Moving-pain usually gets better if you move  ~Z lie (with or without ice)  10 min several times a day until symptoms reduce  ~Slowly resume normal activities   ~Practice Deep Breathing and Relaxation techniques                                                 MY EXERCISE PLAN  GO TO STRETCHES  2/day (like brushing your teeth) STRENGTHENING  2-3 times/week CARDIO PROGRAM  20 min 4x/week   Cervical retractions, neck extension, thoracic extension Prone on elbow cervical retractions running   Upper trap stretch Prone neck extension    Levator scap stretch Bird dog with 10# row    Open book stretch Dead bug with big therapy ball    Headaches-prone chin on hands or tennis ball relief Paloff Press with silver band                  " never used